# Patient Record
Sex: MALE | Race: WHITE | NOT HISPANIC OR LATINO | URBAN - METROPOLITAN AREA
[De-identification: names, ages, dates, MRNs, and addresses within clinical notes are randomized per-mention and may not be internally consistent; named-entity substitution may affect disease eponyms.]

---

## 2017-04-20 ENCOUNTER — OUTPATIENT (OUTPATIENT)
Dept: OUTPATIENT SERVICES | Facility: HOSPITAL | Age: 52
LOS: 1 days | End: 2017-04-20
Payer: COMMERCIAL

## 2017-04-20 DIAGNOSIS — Z00.00 ENCOUNTER FOR GENERAL ADULT MEDICAL EXAMINATION WITHOUT ABNORMAL FINDINGS: ICD-10-CM

## 2017-04-20 LAB
HBV SURFACE AB SER-ACNC: ABNORMAL
HBV SURFACE AG SER-ACNC: SIGNIFICANT CHANGE UP

## 2017-04-20 PROCEDURE — 87340 HEPATITIS B SURFACE AG IA: CPT

## 2017-04-20 PROCEDURE — 86706 HEP B SURFACE ANTIBODY: CPT

## 2017-04-20 PROCEDURE — 36415 COLL VENOUS BLD VENIPUNCTURE: CPT

## 2017-07-14 ENCOUNTER — OTHER (OUTPATIENT)
Age: 52
End: 2017-07-14

## 2017-08-18 ENCOUNTER — OUTPATIENT (OUTPATIENT)
Dept: OUTPATIENT SERVICES | Facility: HOSPITAL | Age: 52
LOS: 1 days | End: 2017-08-18
Payer: COMMERCIAL

## 2017-08-18 VITALS
TEMPERATURE: 98 F | HEART RATE: 80 BPM | OXYGEN SATURATION: 95 % | WEIGHT: 216.93 LBS | RESPIRATION RATE: 18 BRPM | SYSTOLIC BLOOD PRESSURE: 134 MMHG | HEIGHT: 65.5 IN | DIASTOLIC BLOOD PRESSURE: 96 MMHG

## 2017-08-18 DIAGNOSIS — K42.9 UMBILICAL HERNIA WITHOUT OBSTRUCTION OR GANGRENE: ICD-10-CM

## 2017-08-18 DIAGNOSIS — G47.30 SLEEP APNEA, UNSPECIFIED: ICD-10-CM

## 2017-08-18 DIAGNOSIS — Z01.818 ENCOUNTER FOR OTHER PREPROCEDURAL EXAMINATION: ICD-10-CM

## 2017-08-18 LAB
HCT VFR BLD CALC: 45.8 % — SIGNIFICANT CHANGE UP (ref 39–50)
HGB BLD-MCNC: 15.5 G/DL — SIGNIFICANT CHANGE UP (ref 13–17)
MCHC RBC-ENTMCNC: 29.4 PG — SIGNIFICANT CHANGE UP (ref 27–34)
MCHC RBC-ENTMCNC: 33.8 GM/DL — SIGNIFICANT CHANGE UP (ref 32–36)
MCV RBC AUTO: 86.9 FL — SIGNIFICANT CHANGE UP (ref 80–100)
PLATELET # BLD AUTO: 291 K/UL — SIGNIFICANT CHANGE UP (ref 150–400)
RBC # BLD: 5.27 M/UL — SIGNIFICANT CHANGE UP (ref 4.2–5.8)
RBC # FLD: 13.8 % — SIGNIFICANT CHANGE UP (ref 10.3–14.5)
WBC # BLD: 5.76 K/UL — SIGNIFICANT CHANGE UP (ref 3.8–10.5)
WBC # FLD AUTO: 5.76 K/UL — SIGNIFICANT CHANGE UP (ref 3.8–10.5)

## 2017-08-18 PROCEDURE — 85027 COMPLETE CBC AUTOMATED: CPT

## 2017-08-18 PROCEDURE — G0463: CPT

## 2017-08-18 RX ORDER — SODIUM CHLORIDE 9 MG/ML
3 INJECTION INTRAMUSCULAR; INTRAVENOUS; SUBCUTANEOUS EVERY 8 HOURS
Qty: 0 | Refills: 0 | Status: DISCONTINUED | OUTPATIENT
Start: 2017-08-28 | End: 2017-09-12

## 2017-08-18 RX ORDER — CELECOXIB 200 MG/1
200 CAPSULE ORAL ONCE
Qty: 0 | Refills: 0 | Status: COMPLETED | OUTPATIENT
Start: 2017-08-28 | End: 2017-08-28

## 2017-08-18 RX ORDER — ACETAMINOPHEN 500 MG
975 TABLET ORAL ONCE
Qty: 0 | Refills: 0 | Status: COMPLETED | OUTPATIENT
Start: 2017-08-28 | End: 2017-08-28

## 2017-08-18 RX ORDER — LIDOCAINE HCL 20 MG/ML
0.2 VIAL (ML) INJECTION ONCE
Qty: 0 | Refills: 0 | Status: DISCONTINUED | OUTPATIENT
Start: 2017-08-28 | End: 2017-09-12

## 2017-08-18 NOTE — H&P PST ADULT - HISTORY OF PRESENT ILLNESS
51 y/o M PMH LBP, S/P spinal cord stimulator, which has been removed, sarcoidosis of the lung, no longer follows up with pulmonologist, was told it "was no longer there," DONG, non-compliant with CPAP, c/o intermittent pain due to umbilical hernia.  Presents today for umbilical hernia repair.

## 2017-08-18 NOTE — H&P PST ADULT - PSH
Back pain  3/5/2013 replacement of generator  Back pain, chronic  permanent generator placed  Chronic back pain greater than 3 months duration  2/26/ 2013 temporary placement of generator  S/P knee surgery  right  2004

## 2017-08-18 NOTE — H&P PST ADULT - PMH
Lumbar Pain with Radiation Down Both Legs  started april 28 2012  Lung abnormality  scarring of lungs patient states is work related  Reflux    Sleep apnea Lumbar Pain with Radiation Down Both Legs  started april 28 2012  Lung abnormality  scarring of lungs patient states is work related  Reflux    Sarcoid    Sleep apnea

## 2017-08-28 ENCOUNTER — TRANSCRIPTION ENCOUNTER (OUTPATIENT)
Age: 52
End: 2017-08-28

## 2017-08-28 ENCOUNTER — OUTPATIENT (OUTPATIENT)
Dept: OUTPATIENT SERVICES | Facility: HOSPITAL | Age: 52
LOS: 1 days | End: 2017-08-28
Payer: COMMERCIAL

## 2017-08-28 ENCOUNTER — APPOINTMENT (OUTPATIENT)
Dept: SURGERY | Facility: HOSPITAL | Age: 52
End: 2017-08-28
Payer: COMMERCIAL

## 2017-08-28 ENCOUNTER — RESULT REVIEW (OUTPATIENT)
Age: 52
End: 2017-08-28

## 2017-08-28 VITALS
HEART RATE: 63 BPM | TEMPERATURE: 98 F | RESPIRATION RATE: 18 BRPM | SYSTOLIC BLOOD PRESSURE: 106 MMHG | DIASTOLIC BLOOD PRESSURE: 78 MMHG | OXYGEN SATURATION: 97 %

## 2017-08-28 VITALS
WEIGHT: 216.93 LBS | SYSTOLIC BLOOD PRESSURE: 137 MMHG | RESPIRATION RATE: 18 BRPM | HEART RATE: 80 BPM | DIASTOLIC BLOOD PRESSURE: 96 MMHG | HEIGHT: 65.5 IN | OXYGEN SATURATION: 95 % | TEMPERATURE: 98 F

## 2017-08-28 DIAGNOSIS — K42.9 UMBILICAL HERNIA WITHOUT OBSTRUCTION OR GANGRENE: ICD-10-CM

## 2017-08-28 PROCEDURE — C1781: CPT

## 2017-08-28 PROCEDURE — 88304 TISSUE EXAM BY PATHOLOGIST: CPT

## 2017-08-28 PROCEDURE — 49585: CPT

## 2017-08-28 PROCEDURE — 88304 TISSUE EXAM BY PATHOLOGIST: CPT | Mod: 26

## 2017-08-28 RX ORDER — ONDANSETRON 8 MG/1
4 TABLET, FILM COATED ORAL ONCE
Qty: 0 | Refills: 0 | Status: DISCONTINUED | OUTPATIENT
Start: 2017-08-28 | End: 2017-09-12

## 2017-08-28 RX ORDER — SODIUM CHLORIDE 9 MG/ML
1000 INJECTION, SOLUTION INTRAVENOUS
Qty: 0 | Refills: 0 | Status: DISCONTINUED | OUTPATIENT
Start: 2017-08-28 | End: 2017-09-12

## 2017-08-28 RX ORDER — CELECOXIB 200 MG/1
200 CAPSULE ORAL ONCE
Qty: 0 | Refills: 0 | Status: DISCONTINUED | OUTPATIENT
Start: 2017-08-28 | End: 2017-09-12

## 2017-08-28 RX ORDER — OXYCODONE HYDROCHLORIDE 5 MG/1
5 TABLET ORAL ONCE
Qty: 0 | Refills: 0 | Status: DISCONTINUED | OUTPATIENT
Start: 2017-08-28 | End: 2017-08-28

## 2017-08-28 RX ADMIN — Medication 975 MILLIGRAM(S): at 07:21

## 2017-08-28 RX ADMIN — CELECOXIB 200 MILLIGRAM(S): 200 CAPSULE ORAL at 07:22

## 2017-08-28 NOTE — ASU DISCHARGE PLAN (ADULT/PEDIATRIC). - MEDICATION SUMMARY - MEDICATIONS TO TAKE
I will START or STAY ON the medications listed below when I get home from the hospital:    Pepcid 20 mg oral tablet  --  by mouth as per ASU protocol  -- Indication: For GERD

## 2017-08-28 NOTE — ASU PATIENT PROFILE, ADULT - PMH
Lumbar Pain with Radiation Down Both Legs  started april 28 2012  Lung abnormality  scarring of lungs patient states is work related  Reflux    Sarcoid    Sleep apnea

## 2017-08-28 NOTE — ASU DISCHARGE PLAN (ADULT/PEDIATRIC). - SPECIAL INSTRUCTIONS
Please follow up with Dr. De Luna in two weeks.    Do not lift anything over ten pounds. Do not strain. Do not drive as your movements will be restricted by pain. You will be discharged with pain medications, please do not drive while taking these. Do not drink alcohol while taking narcotic pain medications.     Your incision is covered by strips called steri-strips. These will fall off on their own. Please do not pick at or take them off. You may shower with them, just allow water to run over them and do not scrub or submerge them.

## 2017-08-28 NOTE — ASU DISCHARGE PLAN (ADULT/PEDIATRIC). - NOTIFY
Pain not relieved by Medications/Inability to Tolerate Liquids or Foods/Bleeding that does not stop/Persistent Nausea and Vomiting/Numbness, color, or temperature change to extremity/Unable to Urinate

## 2017-08-29 LAB — SURGICAL PATHOLOGY STUDY: SIGNIFICANT CHANGE UP

## 2017-09-13 ENCOUNTER — APPOINTMENT (OUTPATIENT)
Dept: SURGERY | Facility: CLINIC | Age: 52
End: 2017-09-13
Payer: COMMERCIAL

## 2017-09-13 VITALS
DIASTOLIC BLOOD PRESSURE: 89 MMHG | OXYGEN SATURATION: 100 % | SYSTOLIC BLOOD PRESSURE: 143 MMHG | TEMPERATURE: 97.8 F | HEART RATE: 81 BPM | RESPIRATION RATE: 15 BRPM

## 2017-09-13 PROCEDURE — 99024 POSTOP FOLLOW-UP VISIT: CPT

## 2017-09-13 RX ORDER — OXYCODONE AND ACETAMINOPHEN 5; 325 MG/1; MG/1
5-325 TABLET ORAL
Qty: 10 | Refills: 0 | Status: DISCONTINUED | COMMUNITY
Start: 2017-08-28 | End: 2017-09-13

## 2017-10-31 ENCOUNTER — TRANSCRIPTION ENCOUNTER (OUTPATIENT)
Age: 52
End: 2017-10-31

## 2017-11-08 ENCOUNTER — TRANSCRIPTION ENCOUNTER (OUTPATIENT)
Age: 52
End: 2017-11-08

## 2018-10-02 ENCOUNTER — APPOINTMENT (OUTPATIENT)
Dept: ULTRASOUND IMAGING | Facility: CLINIC | Age: 53
End: 2018-10-02
Payer: COMMERCIAL

## 2018-10-02 ENCOUNTER — OUTPATIENT (OUTPATIENT)
Dept: OUTPATIENT SERVICES | Facility: HOSPITAL | Age: 53
LOS: 1 days | End: 2018-10-02
Payer: COMMERCIAL

## 2018-10-02 DIAGNOSIS — Z00.8 ENCOUNTER FOR OTHER GENERAL EXAMINATION: ICD-10-CM

## 2018-10-02 PROCEDURE — 76700 US EXAM ABDOM COMPLETE: CPT | Mod: 26

## 2018-10-02 PROCEDURE — 76700 US EXAM ABDOM COMPLETE: CPT

## 2018-10-06 ENCOUNTER — TRANSCRIPTION ENCOUNTER (OUTPATIENT)
Age: 53
End: 2018-10-06

## 2019-01-08 ENCOUNTER — TRANSCRIPTION ENCOUNTER (OUTPATIENT)
Age: 54
End: 2019-01-08

## 2019-01-29 ENCOUNTER — APPOINTMENT (OUTPATIENT)
Dept: ORTHOPEDIC SURGERY | Facility: CLINIC | Age: 54
End: 2019-01-29

## 2019-02-01 ENCOUNTER — APPOINTMENT (OUTPATIENT)
Dept: ORTHOPEDIC SURGERY | Facility: CLINIC | Age: 54
End: 2019-02-01
Payer: COMMERCIAL

## 2019-02-01 VITALS — HEIGHT: 65 IN | BODY MASS INDEX: 34.66 KG/M2 | WEIGHT: 208 LBS

## 2019-02-01 PROCEDURE — 99203 OFFICE O/P NEW LOW 30 MIN: CPT

## 2019-02-01 PROCEDURE — 73030 X-RAY EXAM OF SHOULDER: CPT | Mod: RT

## 2019-02-01 NOTE — DISCUSSION/SUMMARY
[de-identified] : The underlying pathophysiology was reviewed in great detail with the patient as well as the various treatment options, including ice, analgesics, NSAIDs, Physical therapy, steroid injections, right TSA, hyaluronic acid injections, right shoulder arthroscopic debridement. \par \par The risks and benefits of a RIGHT TOTAL SHOULDER ARTHROPLASTY were discussed in great detail today, including but not limited to bleeding, infection, nerve injury, DVT, allergy to the anesthetic or to the implants, persistent pain, stiffness, scarring, swelling or deformity. He is going to take time to decide if he would like to proceed with surgical intervention at this time.

## 2019-02-01 NOTE — ADDENDUM
[FreeTextEntry1] : I, Lizette Flores, acted solely as a scribe for Dr. Alec Castaneda on this date 02/01/2019 .

## 2019-02-01 NOTE — END OF VISIT
[FreeTextEntry3] : All medical record entries made by the [facility] were at my, Dr. Alec Castaneda, direction and personally dictated by me on 02/01/2019. I have reviewed the chart and agree that the record accurately reflects my personal performance of the history, physical exam, assessment and plan. I have also personally directed, reviewed, and agreed with the chart.

## 2019-02-01 NOTE — PHYSICAL EXAM
[Normal RUE] : Right Upper Extremity: No scars, rashes, lesions, ulcers, skin intact [Normal LUE] : Left Upper Extremity: No scars, rashes, lesions, ulcers, skin intact [Normal Touch] : sensation intact for touch [Normal] : No swelling, no edema, normal pedal pulses and normal temperature [Obese] : obese [Poor Appearance] : well-appearing [Acute Distress] : not in acute distress [de-identified] : Right Upper Extremity\par o Shoulder :\par ¦ Inspection/Palpation : no tenderness, no swelling, no deformities\par ¦ Range of Motion : ACTIVE FORWARD ELEVATION: Measured at 80 degrees, ACTIVE EXTERNAL ROTATION: Measured at 30 degrees, ACTIVE INTERNAL ROTATION: Measured at greater trochanter \par ¦ Strength : external rotation 5/5, internal rotation 5/5, supraspinatus 5/5 \par ¦ Stability : no joint instability on provocative testing\par o Upper Arm : no tenderness, no swelling, no deformities\par o Muscle Bulk : no atrophy \par o Sensation : sensation intact to light touch \par o Skin : no skin rash or discoloration \par o Vascular Exam : no edema, no cyanosis, radial and ulnar pulses normal \par  \par Left Upper Extremity\par o Shoulder : \par ¦ Inspection/Palpation : no tenderness, no swelling, no deformities\par ¦ Range of Motion : ACTIVE FORWARD ELEVATION: Measured at 125 degrees, ACTIVE EXTERNAL ROTATION: Measured at 30 degrees, ACTIVE INTERNAL ROTATION: Measured at PSIS \par ¦ Strength : external rotation 5/5, internal rotation 5/5, supraspinatus 5/5 \par ¦ Stability : no joint instability on provocative testing\par o Upper Arm : no tenderness, no swelling, no deformities\par o Muscle Bulk : no atrophy\par o Sensation : sensation intact to light touch\par o Skin : no skin rash or discoloration\par o Vascular Exam : no edema, no cyanosis, radial and ulnar pulses normal  [de-identified] : o An MRI of the right shoulder was obtained at [facility] on 1/26/2019, revealed:\par 1. No complete rotator cuff tear.\par 2. There is supraspinatus tendinosis with interstitial tearing at the footprint.\par 3. Moderate to severe glenohumeral arthrosis is present with small moderate sized effusion.\par \par XRays taken in the office today, 2/1/2019:\par o  Right Shoulder : Grashey, axillary, and outlet views were obtained, there are no soft tissue abnormalities, no fractures, moderate to advanced glenohumeral osteoarthritis\par

## 2019-02-01 NOTE — CONSULT LETTER
[Dear  ___] : Dear  [unfilled], [Consult Letter:] : I had the pleasure of evaluating your patient, [unfilled]. [Please see my note below.] : Please see my note below. [Consult Closing:] : Thank you very much for allowing me to participate in the care of this patient.  If you have any questions, please do not hesitate to contact me. [Sincerely,] : Sincerely, [FreeTextEntry2] : SELF,REFERRED [FreeTextEntry3] :  Dr. Alec Castaneda

## 2019-02-20 ENCOUNTER — OUTPATIENT (OUTPATIENT)
Dept: OUTPATIENT SERVICES | Facility: HOSPITAL | Age: 54
LOS: 1 days | End: 2019-02-20
Payer: COMMERCIAL

## 2019-02-20 VITALS
OXYGEN SATURATION: 96 % | RESPIRATION RATE: 16 BRPM | DIASTOLIC BLOOD PRESSURE: 90 MMHG | TEMPERATURE: 98 F | HEART RATE: 73 BPM | WEIGHT: 210.98 LBS | HEIGHT: 67 IN | SYSTOLIC BLOOD PRESSURE: 140 MMHG

## 2019-02-20 DIAGNOSIS — Z01.818 ENCOUNTER FOR OTHER PREPROCEDURAL EXAMINATION: ICD-10-CM

## 2019-02-20 DIAGNOSIS — D86.9 SARCOIDOSIS, UNSPECIFIED: ICD-10-CM

## 2019-02-20 DIAGNOSIS — Z98.890 OTHER SPECIFIED POSTPROCEDURAL STATES: Chronic | ICD-10-CM

## 2019-02-20 DIAGNOSIS — M19.011 PRIMARY OSTEOARTHRITIS, RIGHT SHOULDER: ICD-10-CM

## 2019-02-20 DIAGNOSIS — M54.5 LOW BACK PAIN: Chronic | ICD-10-CM

## 2019-02-20 LAB
ALBUMIN SERPL ELPH-MCNC: 4 G/DL — SIGNIFICANT CHANGE UP (ref 3.3–5)
ALP SERPL-CCNC: 84 U/L — SIGNIFICANT CHANGE UP (ref 30–120)
ALT FLD-CCNC: 39 U/L DA — SIGNIFICANT CHANGE UP (ref 10–60)
ANION GAP SERPL CALC-SCNC: 10 MMOL/L — SIGNIFICANT CHANGE UP (ref 5–17)
APTT BLD: 33.7 SEC — SIGNIFICANT CHANGE UP (ref 28.5–37)
AST SERPL-CCNC: 20 U/L — SIGNIFICANT CHANGE UP (ref 10–40)
BILIRUB SERPL-MCNC: 0.4 MG/DL — SIGNIFICANT CHANGE UP (ref 0.2–1.2)
BLD GP AB SCN SERPL QL: SIGNIFICANT CHANGE UP
BUN SERPL-MCNC: 13 MG/DL — SIGNIFICANT CHANGE UP (ref 7–23)
CALCIUM SERPL-MCNC: 9.1 MG/DL — SIGNIFICANT CHANGE UP (ref 8.4–10.5)
CHLORIDE SERPL-SCNC: 104 MMOL/L — SIGNIFICANT CHANGE UP (ref 96–108)
CO2 SERPL-SCNC: 25 MMOL/L — SIGNIFICANT CHANGE UP (ref 22–31)
CREAT SERPL-MCNC: 1.04 MG/DL — SIGNIFICANT CHANGE UP (ref 0.5–1.3)
GLUCOSE SERPL-MCNC: 104 MG/DL — HIGH (ref 70–99)
HCT VFR BLD CALC: 47.4 % — SIGNIFICANT CHANGE UP (ref 39–50)
HGB BLD-MCNC: 15.9 G/DL — SIGNIFICANT CHANGE UP (ref 13–17)
INR BLD: 1.17 RATIO — HIGH (ref 0.88–1.16)
MCHC RBC-ENTMCNC: 28.6 PG — SIGNIFICANT CHANGE UP (ref 27–34)
MCHC RBC-ENTMCNC: 33.5 GM/DL — SIGNIFICANT CHANGE UP (ref 32–36)
MCV RBC AUTO: 85.3 FL — SIGNIFICANT CHANGE UP (ref 80–100)
MRSA PCR RESULT.: SIGNIFICANT CHANGE UP
NRBC # BLD: 0 /100 WBCS — SIGNIFICANT CHANGE UP (ref 0–0)
PLATELET # BLD AUTO: 277 K/UL — SIGNIFICANT CHANGE UP (ref 150–400)
POTASSIUM SERPL-MCNC: 4.5 MMOL/L — SIGNIFICANT CHANGE UP (ref 3.5–5.3)
POTASSIUM SERPL-SCNC: 4.5 MMOL/L — SIGNIFICANT CHANGE UP (ref 3.5–5.3)
PROT SERPL-MCNC: 8 G/DL — SIGNIFICANT CHANGE UP (ref 6–8.3)
PROTHROM AB SERPL-ACNC: 12.8 SEC — SIGNIFICANT CHANGE UP (ref 10–12.9)
RBC # BLD: 5.56 M/UL — SIGNIFICANT CHANGE UP (ref 4.2–5.8)
RBC # FLD: 13.1 % — SIGNIFICANT CHANGE UP (ref 10.3–14.5)
S AUREUS DNA NOSE QL NAA+PROBE: DETECTED
SODIUM SERPL-SCNC: 139 MMOL/L — SIGNIFICANT CHANGE UP (ref 135–145)
WBC # BLD: 5.18 K/UL — SIGNIFICANT CHANGE UP (ref 3.8–10.5)
WBC # FLD AUTO: 5.18 K/UL — SIGNIFICANT CHANGE UP (ref 3.8–10.5)

## 2019-02-20 PROCEDURE — 80053 COMPREHEN METABOLIC PANEL: CPT

## 2019-02-20 PROCEDURE — 86901 BLOOD TYPING SEROLOGIC RH(D): CPT

## 2019-02-20 PROCEDURE — 86900 BLOOD TYPING SEROLOGIC ABO: CPT

## 2019-02-20 PROCEDURE — 71046 X-RAY EXAM CHEST 2 VIEWS: CPT

## 2019-02-20 PROCEDURE — 93005 ELECTROCARDIOGRAM TRACING: CPT

## 2019-02-20 PROCEDURE — 71046 X-RAY EXAM CHEST 2 VIEWS: CPT | Mod: 26

## 2019-02-20 PROCEDURE — 93010 ELECTROCARDIOGRAM REPORT: CPT

## 2019-02-20 PROCEDURE — 85610 PROTHROMBIN TIME: CPT

## 2019-02-20 PROCEDURE — 87640 STAPH A DNA AMP PROBE: CPT

## 2019-02-20 PROCEDURE — 85730 THROMBOPLASTIN TIME PARTIAL: CPT

## 2019-02-20 PROCEDURE — 85027 COMPLETE CBC AUTOMATED: CPT

## 2019-02-20 PROCEDURE — G0463: CPT

## 2019-02-20 PROCEDURE — 36415 COLL VENOUS BLD VENIPUNCTURE: CPT

## 2019-02-20 PROCEDURE — 87641 MR-STAPH DNA AMP PROBE: CPT

## 2019-02-20 PROCEDURE — 86850 RBC ANTIBODY SCREEN: CPT

## 2019-02-20 RX ORDER — FAMOTIDINE 10 MG/ML
0 INJECTION INTRAVENOUS
Qty: 0 | Refills: 0 | COMMUNITY

## 2019-02-20 NOTE — H&P PST ADULT - FAMILY HISTORY
Father  Still living? Yes, Estimated age: 81-90  Family history of prostate cancer, Age at diagnosis: Age Unknown     Mother  Still living? Yes, Estimated age: 81-90  Family history of diabetes mellitus, Age at diagnosis: Age Unknown

## 2019-02-20 NOTE — H&P PST ADULT - PROBLEM SELECTOR PLAN 1
"Right total shoulder arthroplasty" on 3/11/19  Diagnostics ordered  Pending medical clearance   Instructions reviewed and signed  Contact info given   Best wishes offered "Right total shoulder arthroplasty" on 3/11/19  Diagnostics ordered  Pending medical clearance   respiratory consult today  Instructions reviewed and signed  Contact info given   Best wishes offered

## 2019-02-20 NOTE — H&P PST ADULT - PMH
BMI 33.0-33.9,adult    Chronic lower back pain    Herniated lumbar intervertebral disc    Obesity (BMI 30.0-34.9)    Osteoarthritis of right shoulder    Sarcoidosis    Sleep apnea  CPAP

## 2019-02-20 NOTE — H&P PST ADULT - MUSCULOSKELETAL
details… detailed exam no joint erythema/no joint warmth/decreased ROM due to pain/no calf tenderness/no joint swelling

## 2019-02-20 NOTE — H&P PST ADULT - PSH
Chronic back pain greater than 3 months duration  2/26/ 2013 temporary placement of generator,  removed 2013  Chronic lower back pain    History of hemorrhoidectomy  2018  History of hernia repair  ventral 2018  S/P knee surgery  right  2004

## 2019-02-20 NOTE — H&P PST ADULT - HISTORY OF PRESENT ILLNESS
52 yo male presents to PST for scheduled RIGHT total shoulder arthroplasty on 3/11/19 with Alec Castaneda MD.  Reports right shoulder pain with restricted ROM, worst with reaching and lifting that rates 10/10.  Patient has tried cortisone injection without relief.

## 2019-02-20 NOTE — H&P PST ADULT - NEGATIVE ENMT SYMPTOMS
no gum bleeding/no post-nasal discharge/no dry mouth/no dysphagia/no sinus symptoms/no nasal discharge/no vertigo/no nasal congestion/no nose bleeds/no recurrent cold sores/no throat pain/no hearing difficulty/no ear pain/no tinnitus/no nasal obstruction/no abnormal taste sensation

## 2019-02-21 PROBLEM — D86.9 SARCOIDOSIS, UNSPECIFIED: Chronic | Status: INACTIVE | Noted: 2017-08-18 | Resolved: 2019-02-20

## 2019-02-22 RX ORDER — MUPIROCIN 20 MG/G
1 OINTMENT TOPICAL
Qty: 1 | Refills: 0 | OUTPATIENT
Start: 2019-02-22 | End: 2019-02-26

## 2019-02-22 NOTE — PROGRESS NOTE ADULT - SUBJECTIVE AND OBJECTIVE BOX
Nose culture positive for MSSA. Mupirocin ointment 2% escribed and sent to patient's pharmacy and to be used twice a day for 5 consecutive days. Called patient to discuss results. Questions answered and he verbalized an understanding of the treatment protocol.

## 2019-03-06 RX ORDER — TRANEXAMIC ACID 100 MG/ML
1000 INJECTION, SOLUTION INTRAVENOUS ONCE
Qty: 0 | Refills: 0 | Status: COMPLETED | OUTPATIENT
Start: 2019-03-11 | End: 2019-03-11

## 2019-03-06 RX ORDER — ACETAMINOPHEN 500 MG
1000 TABLET ORAL ONCE
Qty: 0 | Refills: 0 | Status: COMPLETED | OUTPATIENT
Start: 2019-03-11 | End: 2019-03-11

## 2019-03-06 RX ORDER — CEFAZOLIN SODIUM 1 G
2000 VIAL (EA) INJECTION ONCE
Qty: 0 | Refills: 0 | Status: COMPLETED | OUTPATIENT
Start: 2019-03-11 | End: 2019-03-11

## 2019-03-06 RX ORDER — SODIUM CHLORIDE 9 MG/ML
1000 INJECTION, SOLUTION INTRAVENOUS
Qty: 0 | Refills: 0 | Status: DISCONTINUED | OUTPATIENT
Start: 2019-03-11 | End: 2019-03-11

## 2019-03-06 RX ORDER — CHLORHEXIDINE GLUCONATE 213 G/1000ML
1 SOLUTION TOPICAL ONCE
Qty: 0 | Refills: 0 | Status: COMPLETED | OUTPATIENT
Start: 2019-03-11 | End: 2019-03-11

## 2019-03-06 RX ORDER — APREPITANT 80 MG/1
40 CAPSULE ORAL ONCE
Qty: 0 | Refills: 0 | Status: COMPLETED | OUTPATIENT
Start: 2019-03-11 | End: 2019-03-11

## 2019-03-08 PROBLEM — M54.5 LOW BACK PAIN: Chronic | Status: ACTIVE | Noted: 2019-02-20

## 2019-03-08 PROBLEM — M19.011 PRIMARY OSTEOARTHRITIS, RIGHT SHOULDER: Chronic | Status: ACTIVE | Noted: 2019-02-20

## 2019-03-08 PROBLEM — E66.9 OBESITY, UNSPECIFIED: Chronic | Status: ACTIVE | Noted: 2019-02-20

## 2019-03-08 PROBLEM — M51.26 OTHER INTERVERTEBRAL DISC DISPLACEMENT, LUMBAR REGION: Chronic | Status: ACTIVE | Noted: 2019-02-20

## 2019-03-08 PROBLEM — D86.9 SARCOIDOSIS, UNSPECIFIED: Chronic | Status: ACTIVE | Noted: 2019-02-20

## 2019-03-08 RX ORDER — SODIUM CHLORIDE 9 MG/ML
1000 INJECTION, SOLUTION INTRAVENOUS
Qty: 0 | Refills: 0 | Status: DISCONTINUED | OUTPATIENT
Start: 2019-03-11 | End: 2019-03-12

## 2019-03-08 RX ORDER — POLYETHYLENE GLYCOL 3350 17 G/17G
17 POWDER, FOR SOLUTION ORAL DAILY
Qty: 0 | Refills: 0 | Status: DISCONTINUED | OUTPATIENT
Start: 2019-03-11 | End: 2019-03-12

## 2019-03-08 RX ORDER — SENNA PLUS 8.6 MG/1
2 TABLET ORAL AT BEDTIME
Qty: 0 | Refills: 0 | Status: DISCONTINUED | OUTPATIENT
Start: 2019-03-11 | End: 2019-03-12

## 2019-03-08 RX ORDER — DOCUSATE SODIUM 100 MG
100 CAPSULE ORAL THREE TIMES A DAY
Qty: 0 | Refills: 0 | Status: DISCONTINUED | OUTPATIENT
Start: 2019-03-11 | End: 2019-03-12

## 2019-03-08 RX ORDER — PANTOPRAZOLE SODIUM 20 MG/1
40 TABLET, DELAYED RELEASE ORAL
Qty: 0 | Refills: 0 | Status: DISCONTINUED | OUTPATIENT
Start: 2019-03-11 | End: 2019-03-12

## 2019-03-08 RX ORDER — ONDANSETRON 8 MG/1
4 TABLET, FILM COATED ORAL EVERY 6 HOURS
Qty: 0 | Refills: 0 | Status: DISCONTINUED | OUTPATIENT
Start: 2019-03-11 | End: 2019-03-12

## 2019-03-08 RX ORDER — MAGNESIUM HYDROXIDE 400 MG/1
30 TABLET, CHEWABLE ORAL DAILY
Qty: 0 | Refills: 0 | Status: DISCONTINUED | OUTPATIENT
Start: 2019-03-11 | End: 2019-03-12

## 2019-03-10 ENCOUNTER — TRANSCRIPTION ENCOUNTER (OUTPATIENT)
Age: 54
End: 2019-03-10

## 2019-03-11 ENCOUNTER — INPATIENT (INPATIENT)
Facility: HOSPITAL | Age: 54
LOS: 0 days | Discharge: HOME | DRG: 483 | End: 2019-03-12
Attending: ORTHOPAEDIC SURGERY | Admitting: ORTHOPAEDIC SURGERY
Payer: COMMERCIAL

## 2019-03-11 ENCOUNTER — TRANSCRIPTION ENCOUNTER (OUTPATIENT)
Age: 54
End: 2019-03-11

## 2019-03-11 ENCOUNTER — RESULT REVIEW (OUTPATIENT)
Age: 54
End: 2019-03-11

## 2019-03-11 ENCOUNTER — APPOINTMENT (OUTPATIENT)
Dept: ORTHOPEDIC SURGERY | Facility: HOSPITAL | Age: 54
End: 2019-03-11

## 2019-03-11 VITALS
RESPIRATION RATE: 22 BRPM | OXYGEN SATURATION: 96 % | HEART RATE: 77 BPM | SYSTOLIC BLOOD PRESSURE: 127 MMHG | DIASTOLIC BLOOD PRESSURE: 86 MMHG | WEIGHT: 217.6 LBS | TEMPERATURE: 98 F | HEIGHT: 65.5 IN

## 2019-03-11 DIAGNOSIS — Z98.890 OTHER SPECIFIED POSTPROCEDURAL STATES: Chronic | ICD-10-CM

## 2019-03-11 DIAGNOSIS — M19.011 PRIMARY OSTEOARTHRITIS, RIGHT SHOULDER: ICD-10-CM

## 2019-03-11 DIAGNOSIS — D86.9 SARCOIDOSIS, UNSPECIFIED: ICD-10-CM

## 2019-03-11 DIAGNOSIS — M54.5 LOW BACK PAIN: Chronic | ICD-10-CM

## 2019-03-11 DIAGNOSIS — E66.9 OBESITY, UNSPECIFIED: ICD-10-CM

## 2019-03-11 LAB — GLUCOSE BLDC GLUCOMTR-MCNC: 131 MG/DL — HIGH (ref 70–99)

## 2019-03-11 PROCEDURE — 23472 RECONSTRUCT SHOULDER JOINT: CPT | Mod: AS,RT

## 2019-03-11 PROCEDURE — 23472 RECONSTRUCT SHOULDER JOINT: CPT | Mod: RT

## 2019-03-11 PROCEDURE — 88304 TISSUE EXAM BY PATHOLOGIST: CPT | Mod: 26

## 2019-03-11 PROCEDURE — 73030 X-RAY EXAM OF SHOULDER: CPT | Mod: 26,RT

## 2019-03-11 PROCEDURE — 88311 DECALCIFY TISSUE: CPT | Mod: 26

## 2019-03-11 PROCEDURE — 73020 X-RAY EXAM OF SHOULDER: CPT | Mod: 26,RT

## 2019-03-11 RX ORDER — CELECOXIB 200 MG/1
1 CAPSULE ORAL
Qty: 60 | Refills: 0
Start: 2019-03-11 | End: 2019-04-09

## 2019-03-11 RX ORDER — ACETAMINOPHEN 500 MG
1000 TABLET ORAL EVERY 6 HOURS
Qty: 0 | Refills: 0 | Status: COMPLETED | OUTPATIENT
Start: 2019-03-11 | End: 2019-03-12

## 2019-03-11 RX ORDER — DOCUSATE SODIUM 100 MG
1 CAPSULE ORAL
Qty: 0 | Refills: 0 | DISCHARGE
Start: 2019-03-11

## 2019-03-11 RX ORDER — SODIUM CHLORIDE 9 MG/ML
1000 INJECTION, SOLUTION INTRAVENOUS
Qty: 0 | Refills: 0 | Status: DISCONTINUED | OUTPATIENT
Start: 2019-03-11 | End: 2019-03-11

## 2019-03-11 RX ORDER — SENNA PLUS 8.6 MG/1
2 TABLET ORAL
Qty: 0 | Refills: 0 | DISCHARGE
Start: 2019-03-11

## 2019-03-11 RX ORDER — ASPIRIN/CALCIUM CARB/MAGNESIUM 324 MG
1 TABLET ORAL
Qty: 84 | Refills: 0
Start: 2019-03-11 | End: 2019-04-21

## 2019-03-11 RX ORDER — PANTOPRAZOLE SODIUM 20 MG/1
1 TABLET, DELAYED RELEASE ORAL
Qty: 30 | Refills: 0
Start: 2019-03-11 | End: 2019-04-09

## 2019-03-11 RX ORDER — ACETAMINOPHEN 500 MG
2 TABLET ORAL
Qty: 0 | Refills: 0 | COMMUNITY

## 2019-03-11 RX ORDER — ASPIRIN/CALCIUM CARB/MAGNESIUM 324 MG
81 TABLET ORAL EVERY 12 HOURS
Qty: 0 | Refills: 0 | Status: DISCONTINUED | OUTPATIENT
Start: 2019-03-12 | End: 2019-03-12

## 2019-03-11 RX ORDER — OXYCODONE HYDROCHLORIDE 5 MG/1
5 TABLET ORAL
Qty: 0 | Refills: 0 | Status: DISCONTINUED | OUTPATIENT
Start: 2019-03-11 | End: 2019-03-12

## 2019-03-11 RX ORDER — HYDROMORPHONE HYDROCHLORIDE 2 MG/ML
0.5 INJECTION INTRAMUSCULAR; INTRAVENOUS; SUBCUTANEOUS
Qty: 0 | Refills: 0 | Status: DISCONTINUED | OUTPATIENT
Start: 2019-03-11 | End: 2019-03-11

## 2019-03-11 RX ORDER — CEFAZOLIN SODIUM 1 G
2000 VIAL (EA) INJECTION EVERY 8 HOURS
Qty: 0 | Refills: 0 | Status: COMPLETED | OUTPATIENT
Start: 2019-03-11 | End: 2019-03-11

## 2019-03-11 RX ORDER — ACETAMINOPHEN 500 MG
1000 TABLET ORAL EVERY 8 HOURS
Qty: 0 | Refills: 0 | Status: DISCONTINUED | OUTPATIENT
Start: 2019-03-12 | End: 2019-03-12

## 2019-03-11 RX ORDER — MAGNESIUM HYDROXIDE 400 MG/1
30 TABLET, CHEWABLE ORAL
Qty: 0 | Refills: 0 | DISCHARGE
Start: 2019-03-11

## 2019-03-11 RX ORDER — POLYETHYLENE GLYCOL 3350 17 G/17G
17 POWDER, FOR SOLUTION ORAL
Qty: 0 | Refills: 0 | DISCHARGE
Start: 2019-03-11

## 2019-03-11 RX ORDER — CELECOXIB 200 MG/1
200 CAPSULE ORAL EVERY 12 HOURS
Qty: 0 | Refills: 0 | Status: DISCONTINUED | OUTPATIENT
Start: 2019-03-11 | End: 2019-03-12

## 2019-03-11 RX ORDER — ACETAMINOPHEN 500 MG
2 TABLET ORAL
Qty: 0 | Refills: 0 | DISCHARGE
Start: 2019-03-11

## 2019-03-11 RX ORDER — OXYCODONE HYDROCHLORIDE 5 MG/1
10 TABLET ORAL
Qty: 0 | Refills: 0 | Status: DISCONTINUED | OUTPATIENT
Start: 2019-03-11 | End: 2019-03-12

## 2019-03-11 RX ORDER — HYDROMORPHONE HYDROCHLORIDE 2 MG/ML
0.5 INJECTION INTRAMUSCULAR; INTRAVENOUS; SUBCUTANEOUS
Qty: 0 | Refills: 0 | Status: DISCONTINUED | OUTPATIENT
Start: 2019-03-11 | End: 2019-03-12

## 2019-03-11 RX ORDER — OXYCODONE HYDROCHLORIDE 5 MG/1
2 TABLET ORAL
Qty: 60 | Refills: 0
Start: 2019-03-11 | End: 2019-03-17

## 2019-03-11 RX ADMIN — CELECOXIB 200 MILLIGRAM(S): 200 CAPSULE ORAL at 21:38

## 2019-03-11 RX ADMIN — HYDROMORPHONE HYDROCHLORIDE 0.5 MILLIGRAM(S): 2 INJECTION INTRAMUSCULAR; INTRAVENOUS; SUBCUTANEOUS at 13:30

## 2019-03-11 RX ADMIN — Medication 1000 MILLIGRAM(S): at 21:40

## 2019-03-11 RX ADMIN — HYDROMORPHONE HYDROCHLORIDE 0.5 MILLIGRAM(S): 2 INJECTION INTRAMUSCULAR; INTRAVENOUS; SUBCUTANEOUS at 20:00

## 2019-03-11 RX ADMIN — APREPITANT 40 MILLIGRAM(S): 80 CAPSULE ORAL at 07:17

## 2019-03-11 RX ADMIN — SENNA PLUS 2 TABLET(S): 8.6 TABLET ORAL at 21:38

## 2019-03-11 RX ADMIN — HYDROMORPHONE HYDROCHLORIDE 0.5 MILLIGRAM(S): 2 INJECTION INTRAMUSCULAR; INTRAVENOUS; SUBCUTANEOUS at 13:54

## 2019-03-11 RX ADMIN — HYDROMORPHONE HYDROCHLORIDE 0.5 MILLIGRAM(S): 2 INJECTION INTRAMUSCULAR; INTRAVENOUS; SUBCUTANEOUS at 16:15

## 2019-03-11 RX ADMIN — HYDROMORPHONE HYDROCHLORIDE 0.5 MILLIGRAM(S): 2 INJECTION INTRAMUSCULAR; INTRAVENOUS; SUBCUTANEOUS at 14:27

## 2019-03-11 RX ADMIN — OXYCODONE HYDROCHLORIDE 10 MILLIGRAM(S): 5 TABLET ORAL at 23:45

## 2019-03-11 RX ADMIN — OXYCODONE HYDROCHLORIDE 10 MILLIGRAM(S): 5 TABLET ORAL at 23:05

## 2019-03-11 RX ADMIN — SODIUM CHLORIDE 75 MILLILITER(S): 9 INJECTION, SOLUTION INTRAVENOUS at 07:00

## 2019-03-11 RX ADMIN — Medication 400 MILLIGRAM(S): at 19:28

## 2019-03-11 RX ADMIN — Medication 100 MILLIGRAM(S): at 16:01

## 2019-03-11 RX ADMIN — Medication 100 MILLIGRAM(S): at 23:05

## 2019-03-11 RX ADMIN — Medication 30 MILLILITER(S): at 21:38

## 2019-03-11 RX ADMIN — Medication 400 MILLIGRAM(S): at 14:21

## 2019-03-11 RX ADMIN — CHLORHEXIDINE GLUCONATE 1 APPLICATION(S): 213 SOLUTION TOPICAL at 07:00

## 2019-03-11 RX ADMIN — HYDROMORPHONE HYDROCHLORIDE 0.5 MILLIGRAM(S): 2 INJECTION INTRAMUSCULAR; INTRAVENOUS; SUBCUTANEOUS at 16:01

## 2019-03-11 RX ADMIN — SODIUM CHLORIDE 50 MILLILITER(S): 9 INJECTION, SOLUTION INTRAVENOUS at 13:32

## 2019-03-11 RX ADMIN — Medication 100 MILLIGRAM(S): at 21:38

## 2019-03-11 RX ADMIN — Medication 1000 MILLIGRAM(S): at 15:02

## 2019-03-11 RX ADMIN — HYDROMORPHONE HYDROCHLORIDE 0.5 MILLIGRAM(S): 2 INJECTION INTRAMUSCULAR; INTRAVENOUS; SUBCUTANEOUS at 19:28

## 2019-03-11 RX ADMIN — HYDROMORPHONE HYDROCHLORIDE 0.5 MILLIGRAM(S): 2 INJECTION INTRAMUSCULAR; INTRAVENOUS; SUBCUTANEOUS at 13:52

## 2019-03-11 RX ADMIN — CELECOXIB 200 MILLIGRAM(S): 200 CAPSULE ORAL at 21:40

## 2019-03-11 NOTE — CONSULT NOTE ADULT - SUBJECTIVE AND OBJECTIVE BOX
PULMONARY/CRITICAL CARE        Patient is a 53y old  Male who presents with a chief complaint of Right Shoulder Pain (11 Mar 2019 16:29)    BRIEF HOSPITAL COURSE: ***Had right shoulder replacement, doing well.     Events last 24 hours: ***    PAST MEDICAL & SURGICAL HISTORY:  Chronic lower back pain  Obesity (BMI 30.0-34.9)  BMI 33.0-33.9,adult  Herniated lumbar intervertebral disc  Sarcoidosis in remission  Osteoarthritis of right shoulder  Sleep apnea: CPAP 6--Dr. Michael  Chronic lower back pain  History of hernia repair: ventral 2018  History of hemorrhoidectomy: 2018  Chronic back pain greater than 3 months duration: 2/26/ 2013 temporary placement of generator,  removed 2013  S/P knee surgery: right  2004    Allergies    No Known Allergies    Intolerances      FAMILY HISTORY and SOCIAL--smoked for 14 yrs 1 ppd, now vapes  No etoh    Family history of diabetes mellitus (Mother)  Family history of prostate cancer (Father)      Review of Systems:  CONSTITUTIONAL: No fever, chills, or fatigue  EYES: No eye pain, visual disturbances, or discharge  ENMT:  No difficulty hearing, tinnitus, vertigo; No sinus or throat pain  NECK: No pain or stiffness  RESPIRATORY: No cough, wheezing, chills or hemoptysis; No shortness of breath  CARDIOVASCULAR: No chest pain, palpitations, dizziness, or leg swelling  GASTROINTESTINAL: No abdominal or epigastric pain. No nausea, vomiting, or hematemesis; No diarrhea or constipation. No melena or hematochezia.  GENITOURINARY: No dysuria, frequency, hematuria, or incontinence  NEUROLOGICAL: No headaches, memory loss, loss of strength, numbness, or tremors  SKIN: No itching, burning, rashes, or lesions   MUSCULOSKELETAL: right shoulder joint pain   PSYCHIATRIC: No depression, anxiety, mood swings, or difficulty sleeping      Medications:  ceFAZolin   IVPB 2000 milliGRAM(s) IV Intermittent every 8 hours        acetaminophen  IVPB .. 1000 milliGRAM(s) IV Intermittent every 6 hours  celecoxib 200 milliGRAM(s) Oral every 12 hours  HYDROmorphone  Injectable 0.5 milliGRAM(s) IV Push every 3 hours PRN  ondansetron Injectable 4 milliGRAM(s) IV Push every 6 hours PRN  oxyCODONE    IR 5 milliGRAM(s) Oral every 3 hours PRN  oxyCODONE    IR 10 milliGRAM(s) Oral every 3 hours PRN        aluminum hydroxide/magnesium hydroxide/simethicone Suspension 30 milliLiter(s) Oral four times a day PRN  docusate sodium 100 milliGRAM(s) Oral three times a day  magnesium hydroxide Suspension 30 milliLiter(s) Oral daily PRN  pantoprazole    Tablet 40 milliGRAM(s) Oral before breakfast  polyethylene glycol 3350 17 Gram(s) Oral daily PRN  senna 2 Tablet(s) Oral at bedtime        lactated ringers. 1000 milliLiter(s) IV Continuous <Continuous>                ICU Vital Signs Last 24 Hrs  T(C): 36.6 (11 Mar 2019 07:03), Max: 36.6 (11 Mar 2019 07:03)  T(F): 97.8 (11 Mar 2019 07:03), Max: 97.8 (11 Mar 2019 07:03)  HR: 92 (11 Mar 2019 15:35) (77 - 94)  BP: 115/76 (11 Mar 2019 15:35) (102/57 - 130/77)  BP(mean): --  ABP: --  ABP(mean): --  RR: 14 (11 Mar 2019 15:25) (14 - 22)  SpO2: 96% (11 Mar 2019 15:35) (87% - 100%)    Vital Signs Last 24 Hrs  T(C): 36.6 (11 Mar 2019 07:03), Max: 36.6 (11 Mar 2019 07:03)  T(F): 97.8 (11 Mar 2019 07:03), Max: 97.8 (11 Mar 2019 07:03)  HR: 92 (11 Mar 2019 15:35) (77 - 94)  BP: 115/76 (11 Mar 2019 15:35) (102/57 - 130/77)  BP(mean): --  RR: 14 (11 Mar 2019 15:25) (14 - 22)  SpO2: 96% (11 Mar 2019 15:35) (87% - 100%)        I&O's Detail    11 Mar 2019 07:01  -  11 Mar 2019 19:08  --------------------------------------------------------  IN:    lactated ringers.: 1050 mL    Oral Fluid: 400 mL  Total IN: 1450 mL    OUT:    Estimated Blood Loss: 200 mL    Voided: 500 mL  Total OUT: 700 mL    Total NET: 750 mL            LABS:                CAPILLARY BLOOD GLUCOSE      POCT Blood Glucose.: 131 mg/dL (11 Mar 2019 13:07)        CULTURES:      Physical Examination:    General: No acute distress.      HEENT: Pupils equal, reactive to light.  Symmetric.    PULM: Clear to auscultation bilaterally, no significant sputum production    CVS: Regular rate and rhythm, no murmurs, rubs, or gallops    ABD: Soft, nondistended, nontender, normoactive bowel sounds, no masses    EXT: No edema, nontender calves; Right shoulder bandaged    SKIN: Warm and well perfused, no rashes noted.    NEURO: Alert, oriented, interactive, nonfocal    RADIOLOGY: ***    CRITICAL CARE TIME SPENT: ***

## 2019-03-11 NOTE — DISCHARGE NOTE PROVIDER - NSDCCPCAREPLAN_GEN_ALL_CORE_FT
PRINCIPAL DISCHARGE DIAGNOSIS  Problem: Primary osteoarthritis of right shoulder  Assessment and Plan of Treatment: DO NOT bear weight on operative extremity.  Sling for comfort and ambulating only.   Remove sling to exercise elbow and shoulder.  Full AAROM of shoulder with ER to 30 degrees; IR as tolerated.  May do Codman's and pendulums.  Keep incision clean and dry.  Change the dressing daily if there is drainage noted.  When there is no drainage the wound may be open to air.   The wound is closed with  Prineo (glued on mesh tape.)  Prineo is removed 2 weeks after surgery at rehab facility or in surgeon's office.  If there is no wet drainage you may shower and pat dry with a clean towel.  - Call your doctor if you experience:  • An increase in pain not controlled by pain medication or change in activity or  position.  • Temperature greater than 101° F.  • Redness, increased swelling or foul smelling drainage from or around the  incision.  • Numbness, tingling or a change in color or temperature of the operative leg.  • Call your doctor immediately if you experience chest pain, shortness of breath or calf pain.

## 2019-03-11 NOTE — BRIEF OPERATIVE NOTE - PROCEDURE
<<-----Click on this checkbox to enter Procedure Right total shoulder arthroplasty (TSA) using cement  03/11/2019    Active  LOGATA

## 2019-03-11 NOTE — PHYSICAL THERAPY INITIAL EVALUATION ADULT - GENERAL OBSERVATIONS, REHAB EVAL
Pt received supine in bed. All lines intact. Pt agreeable to physical therapy. + IV + telemetry + right UE arm sling

## 2019-03-11 NOTE — DISCHARGE NOTE PROVIDER - NSDCHC_MEDRECSTATUS_GEN_ALL_CORE
Admission Reconciliation is Completed  Discharge Reconciliation is Completed Admission Reconciliation is Completed  Discharge Reconciliation is Not Complete

## 2019-03-11 NOTE — DISCHARGE NOTE PROVIDER - NSDCACTIVITY_GEN_ALL_CORE
Stairs allowed/Showering allowed/Walking - Indoors allowed/Walking - Outdoors allowed/No heavy lifting/straining

## 2019-03-11 NOTE — DISCHARGE NOTE PROVIDER - HOSPITAL COURSE
The patient is a 53 y.o. male with severe osteoarthritis of the right shoulder.    After admission on 3/11/19,and receiving pre-operative parenteral prophylactic antibiotics (ancef), the patient  underwent an  uncomplicated right total shoulder replacement by orthopedic surgeon Dr. Devaughn Castaneda.      A medical consultation from the Hospitalist service was obtained for post-operative medical co-management. Typical Physical & occupational therapy modalities post TSA were performed including ambulation training, range of motion, ADL's, and transfers. Ecotrin, along with venodynes, was given for DVT prophylaxis.    The patient had a clean appearing surgical incision with no sign of surgical site infections and had a stable neuro / vascular exam of the operated extremity.  After progression of mobility guided by the PT/ OT staff,  the patient was felt to benefit from further rehabilitative care for restoration to level of function. This was felt to best be accomplished at home.  Discharge and Orthopedic Care instructions were delineated in the Discharge Plan and reviewed with the patient. All medications were delineated in the medication reconciliation tool and key points were reviewed with the patient. They were deemed stable from an Orthopedic & medical standpoint for discharge today.    Upon completion of Home care  they will be  following up with Dr. Castaneda for office  follow up Orthopedic care.

## 2019-03-11 NOTE — DISCHARGE NOTE PROVIDER - CARE PROVIDER_API CALL
Alec Castaneda)  Orthopaedic Sports Medicine; Orthopaedic Surgery  [address] [address]  Phone: [phone]  Fax: [phone]  Follow Up Time: 2 weeks Patient

## 2019-03-11 NOTE — PHYSICAL THERAPY INITIAL EVALUATION ADULT - RANGE OF MOTION EXAMINATION, REHAB EVAL
Left UE ROM was WNL (within normal limits)/right UE n/t due to recent sx/deficits as listed below/bilateral upper extremity ROM was WNL (within normal limits)

## 2019-03-11 NOTE — BRIEF OPERATIVE NOTE - COMMENTS
implants : Vinobo comprehensive shoulder : glenoid medium 4mm cemented, humerus # 12 stem w/ [id number] humeral head

## 2019-03-12 ENCOUNTER — TRANSCRIPTION ENCOUNTER (OUTPATIENT)
Age: 54
End: 2019-03-12

## 2019-03-12 VITALS
TEMPERATURE: 98 F | SYSTOLIC BLOOD PRESSURE: 105 MMHG | HEART RATE: 79 BPM | RESPIRATION RATE: 16 BRPM | OXYGEN SATURATION: 94 % | DIASTOLIC BLOOD PRESSURE: 65 MMHG

## 2019-03-12 LAB
ANION GAP SERPL CALC-SCNC: 7 MMOL/L — SIGNIFICANT CHANGE UP (ref 5–17)
BUN SERPL-MCNC: 15 MG/DL — SIGNIFICANT CHANGE UP (ref 7–23)
CALCIUM SERPL-MCNC: 8.1 MG/DL — LOW (ref 8.4–10.5)
CHLORIDE SERPL-SCNC: 102 MMOL/L — SIGNIFICANT CHANGE UP (ref 96–108)
CO2 SERPL-SCNC: 26 MMOL/L — SIGNIFICANT CHANGE UP (ref 22–31)
CREAT SERPL-MCNC: 0.94 MG/DL — SIGNIFICANT CHANGE UP (ref 0.5–1.3)
GLUCOSE SERPL-MCNC: 134 MG/DL — HIGH (ref 70–99)
HCT VFR BLD CALC: 38.7 % — LOW (ref 39–50)
HGB BLD-MCNC: 12.8 G/DL — LOW (ref 13–17)
MCHC RBC-ENTMCNC: 27.9 PG — SIGNIFICANT CHANGE UP (ref 27–34)
MCHC RBC-ENTMCNC: 33.1 GM/DL — SIGNIFICANT CHANGE UP (ref 32–36)
MCV RBC AUTO: 84.3 FL — SIGNIFICANT CHANGE UP (ref 80–100)
NRBC # BLD: 0 /100 WBCS — SIGNIFICANT CHANGE UP (ref 0–0)
PLATELET # BLD AUTO: 260 K/UL — SIGNIFICANT CHANGE UP (ref 150–400)
POTASSIUM SERPL-MCNC: 4.1 MMOL/L — SIGNIFICANT CHANGE UP (ref 3.5–5.3)
POTASSIUM SERPL-SCNC: 4.1 MMOL/L — SIGNIFICANT CHANGE UP (ref 3.5–5.3)
RBC # BLD: 4.59 M/UL — SIGNIFICANT CHANGE UP (ref 4.2–5.8)
RBC # FLD: 13 % — SIGNIFICANT CHANGE UP (ref 10.3–14.5)
SODIUM SERPL-SCNC: 135 MMOL/L — SIGNIFICANT CHANGE UP (ref 135–145)
WBC # BLD: 10.51 K/UL — HIGH (ref 3.8–10.5)
WBC # FLD AUTO: 10.51 K/UL — HIGH (ref 3.8–10.5)

## 2019-03-12 PROCEDURE — 88304 TISSUE EXAM BY PATHOLOGIST: CPT

## 2019-03-12 PROCEDURE — 97116 GAIT TRAINING THERAPY: CPT

## 2019-03-12 PROCEDURE — 85027 COMPLETE CBC AUTOMATED: CPT

## 2019-03-12 PROCEDURE — 97161 PT EVAL LOW COMPLEX 20 MIN: CPT

## 2019-03-12 PROCEDURE — 36415 COLL VENOUS BLD VENIPUNCTURE: CPT

## 2019-03-12 PROCEDURE — 94664 DEMO&/EVAL PT USE INHALER: CPT

## 2019-03-12 PROCEDURE — C1713: CPT

## 2019-03-12 PROCEDURE — 97165 OT EVAL LOW COMPLEX 30 MIN: CPT

## 2019-03-12 PROCEDURE — 80048 BASIC METABOLIC PNL TOTAL CA: CPT

## 2019-03-12 PROCEDURE — 88311 DECALCIFY TISSUE: CPT

## 2019-03-12 PROCEDURE — C1776: CPT

## 2019-03-12 PROCEDURE — 94660 CPAP INITIATION&MGMT: CPT

## 2019-03-12 PROCEDURE — 73030 X-RAY EXAM OF SHOULDER: CPT

## 2019-03-12 PROCEDURE — 82962 GLUCOSE BLOOD TEST: CPT

## 2019-03-12 PROCEDURE — 73020 X-RAY EXAM OF SHOULDER: CPT

## 2019-03-12 RX ORDER — OXYCODONE HYDROCHLORIDE 5 MG/1
15 TABLET ORAL
Qty: 0 | Refills: 0 | Status: DISCONTINUED | OUTPATIENT
Start: 2019-03-12 | End: 2019-03-12

## 2019-03-12 RX ORDER — OXYCODONE HYDROCHLORIDE 5 MG/1
10 TABLET ORAL
Qty: 0 | Refills: 0 | Status: DISCONTINUED | OUTPATIENT
Start: 2019-03-12 | End: 2019-03-12

## 2019-03-12 RX ADMIN — OXYCODONE HYDROCHLORIDE 15 MILLIGRAM(S): 5 TABLET ORAL at 09:10

## 2019-03-12 RX ADMIN — Medication 1000 MILLIGRAM(S): at 08:42

## 2019-03-12 RX ADMIN — Medication 100 MILLIGRAM(S): at 05:28

## 2019-03-12 RX ADMIN — OXYCODONE HYDROCHLORIDE 15 MILLIGRAM(S): 5 TABLET ORAL at 08:40

## 2019-03-12 RX ADMIN — Medication 1000 MILLIGRAM(S): at 02:18

## 2019-03-12 RX ADMIN — HYDROMORPHONE HYDROCHLORIDE 0.5 MILLIGRAM(S): 2 INJECTION INTRAMUSCULAR; INTRAVENOUS; SUBCUTANEOUS at 02:03

## 2019-03-12 RX ADMIN — Medication 100 MILLIGRAM(S): at 13:06

## 2019-03-12 RX ADMIN — HYDROMORPHONE HYDROCHLORIDE 0.5 MILLIGRAM(S): 2 INJECTION INTRAMUSCULAR; INTRAVENOUS; SUBCUTANEOUS at 05:45

## 2019-03-12 RX ADMIN — CELECOXIB 200 MILLIGRAM(S): 200 CAPSULE ORAL at 08:42

## 2019-03-12 RX ADMIN — PANTOPRAZOLE SODIUM 40 MILLIGRAM(S): 20 TABLET, DELAYED RELEASE ORAL at 05:28

## 2019-03-12 RX ADMIN — CELECOXIB 200 MILLIGRAM(S): 200 CAPSULE ORAL at 08:40

## 2019-03-12 RX ADMIN — Medication 1000 MILLIGRAM(S): at 08:40

## 2019-03-12 RX ADMIN — OXYCODONE HYDROCHLORIDE 15 MILLIGRAM(S): 5 TABLET ORAL at 13:02

## 2019-03-12 RX ADMIN — Medication 81 MILLIGRAM(S): at 05:28

## 2019-03-12 RX ADMIN — HYDROMORPHONE HYDROCHLORIDE 0.5 MILLIGRAM(S): 2 INJECTION INTRAMUSCULAR; INTRAVENOUS; SUBCUTANEOUS at 05:29

## 2019-03-12 RX ADMIN — HYDROMORPHONE HYDROCHLORIDE 0.5 MILLIGRAM(S): 2 INJECTION INTRAMUSCULAR; INTRAVENOUS; SUBCUTANEOUS at 02:23

## 2019-03-12 RX ADMIN — Medication 400 MILLIGRAM(S): at 02:03

## 2019-03-12 NOTE — OCCUPATIONAL THERAPY INITIAL EVALUATION ADULT - ORIENTATION, REHAB EVAL
oriented to person, place, time and situation oriented to person, place, time and situation/Patient educated verbally regarding role of OT,  UE non-weight bearing status & pt. provided with education folder including functional exercises, TSA education/precautions & caregiver guide pamphlet. Pt educated re d/c plan & DME needs (SW/case mgmt notified via sunrise) . Pts spouse present

## 2019-03-12 NOTE — DISCHARGE NOTE NURSING/CASE MANAGEMENT/SOCIAL WORK - NSDCPNDISPN_GEN_ALL_CORE
Education provided on the pain management plan of care/Activities of daily living, including home environment that might     exacerbate pain or reduce effectiveness of the pain management plan of care as well as strategies to address these issues/Side effects of pain management treatment/Safe use, storage and disposal of opioids when prescribed

## 2019-03-12 NOTE — DISCHARGE NOTE NURSING/CASE MANAGEMENT/SOCIAL WORK - NSDCDPATPORTLINK_GEN_ALL_CORE
You can access the [facility] Patient Portal, offered by [facility], by registering with the following website: [email or web address]

## 2019-03-12 NOTE — DISCHARGE NOTE NURSING/CASE MANAGEMENT/SOCIAL WORK - NSDCPEEMAIL_GEN_ALL_CORE
[facility] email [email or web address]

## 2019-03-12 NOTE — PROGRESS NOTE ADULT - ASSESSMENT
Pt stable postop right shoulder replacement.  Hx DONG on cpap  Advised fu Pulmonologist.  Use cpap hs.     Problem/Recommendation - 1:  Problem: R/O Sleep apnea. Recommendation: Cpap hs.     Problem/Recommendation - 2:  ·  Problem: Primary osteoarthritis of right shoulder.  Recommendation: PT.      Problem/Recommendation - 3:  ·  Problem: Obesity (BMI 30.0-34.9).  Recommendation: lose wt.      Problem/Recommendation - 4:  ·  Problem: Sarcoidosis.  Recommendation: observe.

## 2019-03-12 NOTE — OCCUPATIONAL THERAPY INITIAL EVALUATION ADULT - ADDITIONAL COMMENTS
Pt lives in private home with 2 VONDA no HR and 1 flight +HR to bedroom Pt lives in private home with 2 VONDA no HR and 1 flight +HR to bedroom. + stall shower with grab bar

## 2019-03-12 NOTE — OCCUPATIONAL THERAPY INITIAL EVALUATION ADULT - RANGE OF MOTION EXAMINATION, UPPER EXTREMITY
right UE WNLs. + left UE in sling, removed by OT. Pt educted verbally & given TSA packet including HEP/precautions & pendulum ex ( packet reviewed by  who stated ok). NO AROM right shoulder , no ext right UE past 30 deg. AAROM ok (pt only able to tolerate AAROM ff ~ 40 deg with + pain), no shoulder abd, _ AROM elbow to digits & + pendulum ex. Pt able to perform all HEP following vc's . Spouse present & states she is aware of restrictions/HEP, sling use & how to don /doff. left UE WNLs. + right UE in sling, removed by OT. Pt educted verbally & given TSA packet including HEP/precautions & pendulum ex ( packet reviewed by  who stated ok). NO AROM right shoulder , no ext right UE past 30 deg. AAROM ok (pt only able to tolerate AAROM ff ~ 40 deg with + pain), no shoulder abd, _ AROM elbow to digits WNLs & + pendulum ex. Pt able to perform all HEP following vc's . Spouse present & states she is aware of restrictions/HEP, sling use & how to don /doff.

## 2019-03-12 NOTE — PROGRESS NOTE ADULT - SUBJECTIVE AND OBJECTIVE BOX
ORTHOPEDIC PA PROGRESS NOTE  NAHID GRIMES      53y Male                                                                                                                               POD #        STATUS POST:               Pre-Op Dx: Primary osteoarthritis of right shoulder    Post-Op Dx:  Primary osteoarthritis of right shoulder    Procedure: Right total shoulder arthroplasty (TSA) using cement                                                Pain (0-10): 8  Current Pain Management:  [ ] PCA   [ X] Po Analgesics [ ] IM /IV Anagesics   pt notes that PO oxycodone has not been helping however IV dilaudid has been  He notes numbness in his foot    T(F): 98  HR: 78  BP: 106/66  RR: 14  SpO2: 98%                        12.8   10.51 )-----------( 260      ( 12 Mar 2019 07:19 )             38.7                           Physical Exam :    -   Dressing changed sterile.   -   Wound C/D/I.   -   Distal Neurvascular status intact grossly.   -   Warm well perfused; capillary refill <3 seconds   -   median/ulnar/radial nerve intact, AIN/PIN intact  -   (+) Sensation to light touch  -   (-) Calf tenderness Bilaterally  - decreased sensation over deep peroneal nerve between 1st and 2nd toe, sensation intact along other distal dermatomes, EHL/FHL/TA intact      A/P: 53y Male s/p Primary osteoarthritis of right shoulder    -   Ortho Stable  -   Pain control - will increase pain meds  -   Medicine to follow  -   DVT ppx:     [X ]SCDs     [X ] ASA     [ ] Eliquis     [ ] Lovenox  -   Weight bearing status:  WBAT [X ]        PWB    [ ]     TTWB  [ ]      NWB  [ ]   -  Dispo:     Home [X ]     Acute Rehab [ ]     JACOB [ ]     TBD [ ]

## 2019-03-12 NOTE — OCCUPATIONAL THERAPY INITIAL EVALUATION ADULT - NS ASR WT BEARING DETAIL RUE
nonweight-bearing As per  + verbal orders that right UE slight to be worn for ambulation & comfort only./nonweight-bearing

## 2019-03-12 NOTE — PROGRESS NOTE ADULT - SUBJECTIVE AND OBJECTIVE BOX
PULMONARY/CRITICAL CARE      INTERVAL HPI/OVERNIGHT EVENTS:    53y MaleHPI:    Doing well.  Ambulated  Some pain right shoulder.  Wore cpap    PAST MEDICAL & SURGICAL HISTORY:  Chronic lower back pain  Obesity (BMI 30.0-34.9)  BMI 33.0-33.9,adult  Herniated lumbar intervertebral disc  Sarcoidosis  Osteoarthritis of right shoulder  Sleep apnea: CPAP  Chronic lower back pain  History of hernia repair: ventral 2018  History of hemorrhoidectomy: 2018  Chronic back pain greater than 3 months duration: 2/26/ 2013 temporary placement of generator,  removed 2013  S/P knee surgery: right  2004        ICU Vital Signs Last 24 Hrs  T(C): 36.3 (12 Mar 2019 07:52), Max: 36.8 (11 Mar 2019 23:05)  T(F): 97.3 (12 Mar 2019 07:52), Max: 98.3 (11 Mar 2019 23:05)  HR: 70 (12 Mar 2019 07:52) (70 - 94)  BP: 108/72 (12 Mar 2019 07:52) (102/57 - 130/77)  BP(mean): --  ABP: --  ABP(mean): --  RR: 16 (12 Mar 2019 07:52) (14 - 21)  SpO2: 95% (12 Mar 2019 07:52) (87% - 100%)    Qtts:     I&O's Summary    11 Mar 2019 07:01  -  12 Mar 2019 07:00  --------------------------------------------------------  IN: 1450 mL / OUT: 700 mL / NET: 750 mL            REVIEW OF SYSTEMS:    CONSTITUTIONAL: No fever, weight loss, or fatigue  EYES: No eye pain, visual disturbances, or discharge  ENMT:  No difficulty hearing, tinnitus, vertigo; No sinus or throat pain  NECK: No pain or stiffness  BREASTS: No pain, masses, or nipple discharge  RESPIRATORY: No cough, wheezing, chills or hemoptysis; No shortness of breath  CARDIOVASCULAR: No chest pain, palpitations, dizziness, or leg swelling  GASTROINTESTINAL: No abdominal or epigastric pain. No nausea, vomiting, or hematemesis; No diarrhea or constipation. No melena or hematochezia.  GENITOURINARY: No dysuria, frequency, hematuria, or incontinence  NEUROLOGICAL: No headaches, memory loss, loss of strength, numbness, or tremors  SKIN: No itching, burning, rashes, or lesions   LYMPH NODES: No enlarged glands  ENDOCRINE: No heat or cold intolerance; No hair loss  MUSCULOSKELETAL: right shoulder  joint pain No muscle, back, or extremity pain, no calf tenderness  PSYCHIATRIC: No depression, anxiety, mood swings, or difficulty sleeping  HEME/LYMPH: No easy bruising, or bleeding gums  ALLERGY AND IMMUNOLOGIC: No hives or eczema      PHYSICAL EXAM:    GENERAL: NAD, well-groomed, well-developed, NAD  HEAD:  Atraumatic, Normocephalic  EYES: EOMI, PERRLA, conjunctiva and sclera clear  ENMT: No tonsillar erythema, exudates, or enlargement; Moist mucous membranes, Good dentition, No lesions  NECK: Supple, No JVD, Normal thyroid  NERVOUS SYSTEM:  Alert & Oriented X3, Good concentration; Motor Strength 5/5 B/L upper and lower extremities  CHEST/LUNG: Clear to percussion bilaterally; No rales, rhonchi, wheezing, or rubs  HEART: Regular rate and rhythm; No murmurs, rubs, or gallops  ABDOMEN: Soft, Nontender, Nondistended; Bowel sounds present  EXTREMITIES:  2+ Peripheral Pulses, No clubbing, cyanosis, or edema  Right shoulder bandaged  LYMPH: No lymphadenopathy noted  SKIN: No rashes or lesions        LABS:                        12.8   10.51 )-----------( 260      ( 12 Mar 2019 07:19 )             38.7     03-12    135  |  102  |  15  ----------------------------<  134<H>  4.1   |  26  |  0.94    Ca    8.1<L>      12 Mar 2019 07:19            vanco through     RADIOLOGY & ADDITIONAL STUDIES:      CRITICAL CARE TIME SPENT:

## 2019-03-12 NOTE — OCCUPATIONAL THERAPY INITIAL EVALUATION ADULT - PRECAUTIONS/LIMITATIONS, REHAB EVAL
surgical precautions/fall precautions "Total Shoulder Protocol"/surgical precautions/fall precautions

## 2019-03-12 NOTE — OCCUPATIONAL THERAPY INITIAL EVALUATION ADULT - LEVEL OF INDEPENDENCE: DRESS UPPER BODY, OT EVAL
pt in hosp gown with tele monitor. Pt/spouse aware of technique & to wear loose, [place] shirts. Spouse states she will assist.

## 2019-03-12 NOTE — OCCUPATIONAL THERAPY INITIAL EVALUATION ADULT - PREDICTED DURATION OF THERAPY (DAYS/WKS), OT EVAL
Patient cleared from OT services, with recommended DME for safety. Anticipated d/c home with assist  pending medical & PT clearance.

## 2019-03-12 NOTE — DISCHARGE NOTE NURSING/CASE MANAGEMENT/SOCIAL WORK - NSDCPEWEB_GEN_ALL_CORE
[place] website --- [email or web address] for Tobacco Control website --- [email or web address]

## 2019-03-14 LAB — SURGICAL PATHOLOGY STUDY: SIGNIFICANT CHANGE UP

## 2019-03-26 ENCOUNTER — APPOINTMENT (OUTPATIENT)
Dept: ORTHOPEDIC SURGERY | Facility: CLINIC | Age: 54
End: 2019-03-26
Payer: COMMERCIAL

## 2019-03-26 VITALS — HEIGHT: 65 IN | BODY MASS INDEX: 34.66 KG/M2 | WEIGHT: 208 LBS

## 2019-03-26 PROCEDURE — 99024 POSTOP FOLLOW-UP VISIT: CPT

## 2019-03-26 PROCEDURE — 73030 X-RAY EXAM OF SHOULDER: CPT | Mod: RT

## 2019-03-26 NOTE — END OF VISIT
[FreeTextEntry3] : All medical record entries made by the Edwinibe were at my, Dr. Alec Castaneda, direction and personally dictated by me on 03/26/2019. I have reviewed the chart and agree that the record accurately reflects my personal performance of the history, physical exam, assessment and plan. I have also personally directed, reviewed, and agreed with the chart.

## 2019-03-26 NOTE — ADDENDUM
[FreeTextEntry1] : I, Lizette Flores, acted solely as a scribe for Dr. Alec Castaneda on this date 03/26/2019.

## 2019-03-26 NOTE — HISTORY OF PRESENT ILLNESS
[___ Days Post Op] : post op day #[unfilled] [de-identified] : s/p Right total shoulder arthroplasty on 3/11/2019. [de-identified] : 53 year old male presents for a post operative evaluation s/p Right total shoulder arthroplasty on 3/11/2019. Today he is wearing a sling to the office, he notes that he has been doing well post operatively and that his pain has been well controlled. He ceased use of the oxycodone and says that he has had 2 in home physical therapy sessions. He has no signs of infections and denies fever, chills, nausea, or vomiting.  [de-identified] : Right Upper Extremity\par o Shoulder :\par ¦ Inspection/Palpation : generalized tenderness about the shoulder, mild swelling, no deformity, incision well healing with Prineo in place. Wound is clean, dry, and intact with no discharge or dehiscence .\par ¦ Range of Motion : PASSIVE FORWARD ELEVATION: Measured at 100 degrees, ACTIVE EXTERNAL ROTATION: Measured at 0 degrees\par o Upper Arm : no tenderness, no swelling, no deformities\par o Muscle Bulk : no atrophy \par o Sensation : sensation intact to light touch \par o Skin : no skin rash, no discoloration \par o Vascular Exam : no edema, no cyanosis, radial and ulnar pulses normal  [de-identified] : o  Right Shoulder : Grashey, Axillary and Outlet views were obtained, s/p right total shoulder arthroplasty, prosthesis in excellent positioning with no signs of loosening, no acute fractures [de-identified] : Prineo was removed and Steri-Strips were placed. He was instructed to allow the Steri-Strips to fall off on their own. \par \par He is to begin outpatient physical therapy, a prescription was provided. \par \par He is to continue use of the sling.\par \par Activity modifications and restrictions were discussed. \par \par A prescription for Tramadol was provided.\par \par FU 4 weeks.

## 2019-04-09 ENCOUNTER — RX RENEWAL (OUTPATIENT)
Age: 54
End: 2019-04-09

## 2019-04-16 ENCOUNTER — APPOINTMENT (OUTPATIENT)
Dept: ORTHOPEDIC SURGERY | Facility: CLINIC | Age: 54
End: 2019-04-16
Payer: COMMERCIAL

## 2019-04-16 VITALS — WEIGHT: 208 LBS | HEIGHT: 65 IN | BODY MASS INDEX: 34.66 KG/M2

## 2019-04-16 PROCEDURE — 99024 POSTOP FOLLOW-UP VISIT: CPT

## 2019-04-16 NOTE — ADDENDUM
[FreeTextEntry1] : I, Lizette Flores, acted solely as a scribe for Dr. Alec Castaneda on this date 04/16/2019.

## 2019-04-16 NOTE — HISTORY OF PRESENT ILLNESS
[___ Weeks Post Op] : [unfilled] weeks post op [de-identified] : s/p Right total shoulder arthroplasty on 3/11/2019. [de-identified] : 53 year old male presents for a post operative evaluation s/p Right total shoulder arthroplasty on 3/11/2019. He has been doing well but notes intermittent pain that travels down his arm. He has been having some difficulty sleeping at night and has been taking Tramadol as help with the pain. He has been attending physical therapy 3 days per week and is no longer utilizing a sling, he notes improvements in strength and ROM. He has no signs of infections and denies fever, chills, nausea, or vomiting.  [de-identified] : Right Upper Extremity\par o Shoulder :\par ¦ Inspection/Palpation : generalized tenderness about the shoulder, no swelling, no deformity. Incision well healed, clean, dry, and intact with no discharge or dehiscence.\par ¦ Range of Motion : PASSIVE FORWARD ELEVATION: Measured at 120 degrees, ACTIVE FORWARD ELEVATION: Measured at 115 degrees, ACTIVE EXTERNAL ROTATION: Measured at 20 degrees, ACTIVE INTERNAL ROTATION:Measured at L5\par ¦ Strength : external rotation 5/5, internal rotation 5/5, supraspinatus 5-/5\par ¦ Stability : no joint instability on provocative testing\par o Upper Arm : no tenderness, no swelling, no deformities\par o Muscle Bulk : no atrophy \par o Sensation : sensation intact to light touch \par o Skin : no skin rash, no discoloration \par o Vascular Exam : no edema, no cyanosis, radial and ulnar pulses normal  [de-identified] : o  Right Shoulder : Grashey, Axillary and Outlet views were obtained, s/p right total shoulder arthroplasty, prosthesis in excellent positioning with no signs of loosening, no acute fractures [de-identified] : He is to continue with physical therapy.\par \par Activity modifications and restrictions were discussed. \par \par FU 6 weeks

## 2019-04-16 NOTE — END OF VISIT
[FreeTextEntry3] : All medical record entries made by the Edwinibe were at my, Dr. Alec Castaneda, direction and personally dictated by me on 04/16/2019. I have reviewed the chart and agree that the record accurately reflects my personal performance of the history, physical exam, assessment and plan. I have also personally directed, reviewed, and agreed with the chart.

## 2019-05-28 ENCOUNTER — APPOINTMENT (OUTPATIENT)
Dept: ORTHOPEDIC SURGERY | Facility: CLINIC | Age: 54
End: 2019-05-28
Payer: COMMERCIAL

## 2019-05-28 VITALS — HEIGHT: 65 IN | BODY MASS INDEX: 34.66 KG/M2 | WEIGHT: 208 LBS

## 2019-05-28 PROCEDURE — 99024 POSTOP FOLLOW-UP VISIT: CPT

## 2019-05-28 NOTE — END OF VISIT
[FreeTextEntry3] : All medical record entries made by the Edwinibe were at my, Dr. Alec Castaneda, direction and personally dictated by me on 05/28/2019. I have reviewed the chart and agree that the record accurately reflects my personal performance of the history, physical exam, assessment and plan. I have also personally directed, reviewed, and agreed with the chart.

## 2019-05-28 NOTE — ADDENDUM
[FreeTextEntry1] : I, Lizette Flores, acted solely as a scribe for Dr. Alec Castaneda on this date 05/28/2019.

## 2019-05-28 NOTE — HISTORY OF PRESENT ILLNESS
[___ Weeks Post Op] : [unfilled] weeks post op [de-identified] : 53 year old male presents for a post operative evaluation s/p Right total shoulder arthroplasty on 3/11/2019. He has been doing well and reports that his pain has been well controlled and he has been experiencing minimal discomfort about his shoulder. He has been attending physical therapy and notes improvements in strength and ROM. He says that he has been experiencing pain about his elbow when perform tasks such as washing his hair. He has no signs of infections and denies fever, chills, nausea, or vomiting. He would like to schedule a left TSA for July 2019.  [de-identified] : s/p Right total shoulder arthroplasty on 3/11/2019. [de-identified] : Right Upper Extremity\par o Shoulder :\par ¦ Inspection/Palpation : no tenderness about the shoulder, no swelling, no deformity. Incisions well healed, clean, dry, and intact with no discharge or dehiscence.\par ¦ Range of Motion : ACTIVE FORWARD ELEVATION: Measured at 120 degrees, ACTIVE EXTERNAL ROTATION: Measured at 35 degrees, ACTIVE INTERNAL ROTATION:Measured at L5\par ¦ Strength : external rotation 5/5, internal rotation 5/5, supraspinatus 5/5\par ¦ Stability : no joint instability on provocative testing\par ¦ Tests and Signs: Belly Press Test (-) \par o Upper Arm : no tenderness, no swelling, no deformities\par o Muscle Bulk : no atrophy \par o Sensation : sensation intact to light touch \par o Skin : no skin rash, no discoloration \par o Vascular Exam : no edema, no cyanosis, radial and ulnar pulses normal  [de-identified] : He is to continue with physical therapy, a prescription was provided. \par \par FU 2 weeks for his left shoulder.

## 2019-06-03 ENCOUNTER — APPOINTMENT (OUTPATIENT)
Dept: ORTHOPEDIC SURGERY | Facility: CLINIC | Age: 54
End: 2019-06-03
Payer: COMMERCIAL

## 2019-06-03 ENCOUNTER — FORM ENCOUNTER (OUTPATIENT)
Age: 54
End: 2019-06-03

## 2019-06-03 VITALS — WEIGHT: 208 LBS | BODY MASS INDEX: 34.66 KG/M2 | HEIGHT: 65 IN

## 2019-06-03 PROCEDURE — 99214 OFFICE O/P EST MOD 30 MIN: CPT | Mod: 24

## 2019-06-03 PROCEDURE — 73030 X-RAY EXAM OF SHOULDER: CPT | Mod: LT

## 2019-06-03 NOTE — PHYSICAL EXAM
[Normal LUE] : Left Upper Extremity: No scars, rashes, lesions, ulcers, skin intact [Normal Touch] : sensation intact for touch [Normal] : No swelling, no edema, normal pedal pulses and normal temperature [Poor Appearance] : well-appearing [Acute Distress] : not in acute distress [de-identified] : Right Upper Extremity\par o Shoulder :\par ¦ Inspection/Palpation : no tenderness, no swelling, no deformities, surgical scars well appearing \par ¦ Range of Motion : ACTIVE FORWARD ELEVATION: Measured at 130 degrees, ACTIVE EXTERNAL ROTATION: Measured at 40 degrees, ACTIVE INTERNAL ROTATION: Measured at L3\par ¦ Strength : external rotation 5/5, internal rotation 5/5, supraspinatus 5-/5 \par ¦ Stability : no joint instability on provocative testing\par o Upper Arm : no tenderness, no swelling, no deformities\par o Muscle Bulk : no atrophy \par o Sensation : sensation intact to light touch \par o Skin : no skin rash or discoloration \par o Vascular Exam : no edema, no cyanosis, radial and ulnar pulses normal \par  \par Left Upper Extremity\par o Shoulder : \par ¦ Inspection/Palpation : diffuse tenderness, no swelling, no deformities\par ¦ Range of Motion : ACTIVE FORWARD ELEVATION: Measured at 115 degrees, ACTIVE EXTERNAL ROTATION: Measured at 35 degrees, ACTIVE INTERNAL ROTATION: Measured at L3\par ¦ Strength : external rotation 5/5, internal rotation 5/5, supraspinatus 5/5 \par ¦ Stability : no joint instability on provocative testing\par o Upper Arm : no tenderness, no swelling, no deformities\par o Muscle Bulk : no atrophy\par o Sensation : sensation intact to light touch\par o Skin : no skin rash or discoloration\par o Vascular Exam : no edema, no cyanosis, radial and ulnar pulses normal  [de-identified] : o  Left Shoulder : Grashey, Axillary, and Outlet views were obtained, there are no soft tissue abnormalities, no fractures, moderate to advanced glenohumeral osteoarthritis.

## 2019-06-03 NOTE — DISCUSSION/SUMMARY
[de-identified] : The underlying pathophysiology was reviewed in great detail with the patient as well as the various treatment options, including ice, analgesics, NSAIDs, Physical therapy, steroid injections, TSA.\par \par The patient wishes to proceed with SURGICAL INTERVENTION at this time. The risks and benefits of a LEFT TOTAL SHOULDER ARTHROPLASTY were discussed in great detail today, including but not limited to bleeding, infection, nerve injury, DVT, allergy to the anesthetic or to the implants, persistent pain, stiffness, scarring, swelling or deformity. \par \par A CT Scan of the left shoulder was ordered to evaluate the glenoid version prior to planning TSA.

## 2019-06-03 NOTE — HISTORY OF PRESENT ILLNESS
[de-identified] : 53 year old male presents for an evaluation of left shoulder pain, s/p Right total shoulder arthroplasty on 3/11/2019. He has been experiencing left shoulder pain that is a constant aching pain about his shoulder and he describes sharp pain with shoulder rotation and use of his left arm. He has been attending physical therapy for his right shoulder and notes improvements in strength and ROM. He is interested in discussing surgical options for his left shoulder pain.

## 2019-06-03 NOTE — ADDENDUM
[FreeTextEntry1] : I, Lizette Flores, acted solely as a scribe for Dr. Alec Castaneda on this date 06/03/2019.

## 2019-06-03 NOTE — END OF VISIT
[FreeTextEntry3] : All medical record entries made by the Edwinibyanna were at my, Dr. Alec Castaneda, direction and personally dictated by me on 06/03/2019. I have reviewed the chart and agree that the record accurately reflects my personal performance of the history, physical exam, assessment and plan. I have also personally directed, reviewed, and agreed with the chart.

## 2019-06-04 ENCOUNTER — APPOINTMENT (OUTPATIENT)
Dept: CT IMAGING | Facility: CLINIC | Age: 54
End: 2019-06-04
Payer: COMMERCIAL

## 2019-06-04 ENCOUNTER — OUTPATIENT (OUTPATIENT)
Dept: OUTPATIENT SERVICES | Facility: HOSPITAL | Age: 54
LOS: 1 days | End: 2019-06-04
Payer: COMMERCIAL

## 2019-06-04 DIAGNOSIS — Z00.8 ENCOUNTER FOR OTHER GENERAL EXAMINATION: ICD-10-CM

## 2019-06-04 DIAGNOSIS — Z98.890 OTHER SPECIFIED POSTPROCEDURAL STATES: Chronic | ICD-10-CM

## 2019-06-04 DIAGNOSIS — M19.011 PRIMARY OSTEOARTHRITIS, RIGHT SHOULDER: ICD-10-CM

## 2019-06-04 DIAGNOSIS — M54.5 LOW BACK PAIN: Chronic | ICD-10-CM

## 2019-06-04 DIAGNOSIS — M19.012 PRIMARY OSTEOARTHRITIS, LEFT SHOULDER: ICD-10-CM

## 2019-06-04 PROCEDURE — 73200 CT UPPER EXTREMITY W/O DYE: CPT | Mod: 26,LT

## 2019-06-04 PROCEDURE — 76376 3D RENDER W/INTRP POSTPROCES: CPT | Mod: 26

## 2019-06-04 PROCEDURE — 73200 CT UPPER EXTREMITY W/O DYE: CPT

## 2019-06-04 PROCEDURE — 76376 3D RENDER W/INTRP POSTPROCES: CPT

## 2019-07-05 ENCOUNTER — OUTPATIENT (OUTPATIENT)
Dept: OUTPATIENT SERVICES | Facility: HOSPITAL | Age: 54
LOS: 1 days | End: 2019-07-05
Payer: COMMERCIAL

## 2019-07-05 VITALS
HEIGHT: 66 IN | HEART RATE: 88 BPM | SYSTOLIC BLOOD PRESSURE: 137 MMHG | TEMPERATURE: 98 F | WEIGHT: 222.01 LBS | DIASTOLIC BLOOD PRESSURE: 88 MMHG | RESPIRATION RATE: 15 BRPM | OXYGEN SATURATION: 96 %

## 2019-07-05 DIAGNOSIS — Z98.890 OTHER SPECIFIED POSTPROCEDURAL STATES: Chronic | ICD-10-CM

## 2019-07-05 DIAGNOSIS — M19.012 PRIMARY OSTEOARTHRITIS, LEFT SHOULDER: ICD-10-CM

## 2019-07-05 DIAGNOSIS — M25.512 PAIN IN LEFT SHOULDER: ICD-10-CM

## 2019-07-05 DIAGNOSIS — Z96.611 PRESENCE OF RIGHT ARTIFICIAL SHOULDER JOINT: Chronic | ICD-10-CM

## 2019-07-05 DIAGNOSIS — Z01.818 ENCOUNTER FOR OTHER PREPROCEDURAL EXAMINATION: ICD-10-CM

## 2019-07-05 DIAGNOSIS — M54.5 LOW BACK PAIN: Chronic | ICD-10-CM

## 2019-07-05 LAB
ALBUMIN SERPL ELPH-MCNC: 4 G/DL — SIGNIFICANT CHANGE UP (ref 3.3–5)
ALP SERPL-CCNC: 98 U/L — SIGNIFICANT CHANGE UP (ref 30–120)
ALT FLD-CCNC: 44 U/L DA — SIGNIFICANT CHANGE UP (ref 10–60)
ANION GAP SERPL CALC-SCNC: 9 MMOL/L — SIGNIFICANT CHANGE UP (ref 5–17)
APTT BLD: 31.6 SEC — SIGNIFICANT CHANGE UP (ref 28.5–37)
AST SERPL-CCNC: 18 U/L — SIGNIFICANT CHANGE UP (ref 10–40)
BILIRUB SERPL-MCNC: 0.2 MG/DL — SIGNIFICANT CHANGE UP (ref 0.2–1.2)
BUN SERPL-MCNC: 14 MG/DL — SIGNIFICANT CHANGE UP (ref 7–23)
CALCIUM SERPL-MCNC: 9.4 MG/DL — SIGNIFICANT CHANGE UP (ref 8.4–10.5)
CHLORIDE SERPL-SCNC: 104 MMOL/L — SIGNIFICANT CHANGE UP (ref 96–108)
CO2 SERPL-SCNC: 25 MMOL/L — SIGNIFICANT CHANGE UP (ref 22–31)
CREAT SERPL-MCNC: 1.05 MG/DL — SIGNIFICANT CHANGE UP (ref 0.5–1.3)
GLUCOSE SERPL-MCNC: 155 MG/DL — HIGH (ref 70–99)
HCT VFR BLD CALC: 45.5 % — SIGNIFICANT CHANGE UP (ref 39–50)
HGB BLD-MCNC: 15.4 G/DL — SIGNIFICANT CHANGE UP (ref 13–17)
INR BLD: 1.12 RATIO — SIGNIFICANT CHANGE UP (ref 0.88–1.16)
MCHC RBC-ENTMCNC: 28.7 PG — SIGNIFICANT CHANGE UP (ref 27–34)
MCHC RBC-ENTMCNC: 33.8 GM/DL — SIGNIFICANT CHANGE UP (ref 32–36)
MCV RBC AUTO: 84.7 FL — SIGNIFICANT CHANGE UP (ref 80–100)
MRSA PCR RESULT.: SIGNIFICANT CHANGE UP
NRBC # BLD: 0 /100 WBCS — SIGNIFICANT CHANGE UP (ref 0–0)
PLATELET # BLD AUTO: 278 K/UL — SIGNIFICANT CHANGE UP (ref 150–400)
POTASSIUM SERPL-MCNC: 4.1 MMOL/L — SIGNIFICANT CHANGE UP (ref 3.5–5.3)
POTASSIUM SERPL-SCNC: 4.1 MMOL/L — SIGNIFICANT CHANGE UP (ref 3.5–5.3)
PROT SERPL-MCNC: 7.8 G/DL — SIGNIFICANT CHANGE UP (ref 6–8.3)
PROTHROM AB SERPL-ACNC: 12.2 SEC — SIGNIFICANT CHANGE UP (ref 10–12.9)
RBC # BLD: 5.37 M/UL — SIGNIFICANT CHANGE UP (ref 4.2–5.8)
RBC # FLD: 13.2 % — SIGNIFICANT CHANGE UP (ref 10.3–14.5)
S AUREUS DNA NOSE QL NAA+PROBE: DETECTED
SODIUM SERPL-SCNC: 138 MMOL/L — SIGNIFICANT CHANGE UP (ref 135–145)
WBC # BLD: 5.29 K/UL — SIGNIFICANT CHANGE UP (ref 3.8–10.5)
WBC # FLD AUTO: 5.29 K/UL — SIGNIFICANT CHANGE UP (ref 3.8–10.5)

## 2019-07-05 PROCEDURE — 36415 COLL VENOUS BLD VENIPUNCTURE: CPT

## 2019-07-05 PROCEDURE — 93010 ELECTROCARDIOGRAM REPORT: CPT

## 2019-07-05 PROCEDURE — 85730 THROMBOPLASTIN TIME PARTIAL: CPT

## 2019-07-05 PROCEDURE — 80053 COMPREHEN METABOLIC PANEL: CPT

## 2019-07-05 PROCEDURE — 85027 COMPLETE CBC AUTOMATED: CPT

## 2019-07-05 PROCEDURE — 86901 BLOOD TYPING SEROLOGIC RH(D): CPT

## 2019-07-05 PROCEDURE — G0463: CPT

## 2019-07-05 PROCEDURE — 86900 BLOOD TYPING SEROLOGIC ABO: CPT

## 2019-07-05 PROCEDURE — 86850 RBC ANTIBODY SCREEN: CPT

## 2019-07-05 PROCEDURE — 85610 PROTHROMBIN TIME: CPT

## 2019-07-05 PROCEDURE — 93005 ELECTROCARDIOGRAM TRACING: CPT

## 2019-07-05 PROCEDURE — 87640 STAPH A DNA AMP PROBE: CPT

## 2019-07-05 NOTE — H&P PST ADULT - NSICDXFAMILYHX_GEN_ALL_CORE_FT
FAMILY HISTORY:  Father  Still living? Yes, Estimated age: 81-90  Family history of prostate cancer, Age at diagnosis: Age Unknown    Mother  Still living? Yes, Estimated age: 81-90  Family history of diabetes mellitus, Age at diagnosis: Age Unknown

## 2019-07-05 NOTE — H&P PST ADULT - HISTORY OF PRESENT ILLNESS
this is a 54 y/o male who has pain left shoulder for about 1 year; tests show bone on bone, bone spurs; to have left total shoulder replacement; takes advil or tylenol for pain

## 2019-07-05 NOTE — H&P PST ADULT - NSICDXPASTSURGICALHX_GEN_ALL_CORE_FT
PAST SURGICAL HISTORY:  Chronic back pain greater than 3 months duration 2/26/ 2013 temporary placement of generator,  removed 2013    Chronic lower back pain     History of hemorrhoidectomy 2018    History of hernia repair ventral 2018    S/P knee surgery right  2004    S/P shoulder replacement, right 3/19

## 2019-07-05 NOTE — H&P PST ADULT - NSICDXPASTMEDICALHX_GEN_ALL_CORE_FT
PAST MEDICAL HISTORY:  BMI 33.0-33.9,adult     Chronic lower back pain     GERD (gastroesophageal reflux disease)     Herniated lumbar intervertebral disc     OA (osteoarthritis)     Obesity (BMI 30.0-34.9)     Osteoarthritis of right shoulder     Sarcoidosis     Sleep apnea CPAP    Vertigo

## 2019-07-05 NOTE — H&P PST ADULT - NSICDXPROBLEM_GEN_ALL_CORE_FT
PROBLEM DIAGNOSES  Problem: Left shoulder pain  Assessment and Plan: left total shoulder replacement      R/O PROBLEM DIAGNOSES  Problem: Obstructive sleep apnea  Assessment and Plan: pulmonary clearance needed; consulted with RT-patient will bring own CPAP machine    Problem: Abnormal EKG  Assessment and Plan: medical clearance required

## 2019-07-08 RX ORDER — MUPIROCIN 20 MG/G
1 OINTMENT TOPICAL
Qty: 1 | Refills: 0
Start: 2019-07-08 | End: 2019-07-12

## 2019-07-08 NOTE — PROGRESS NOTE ADULT - SUBJECTIVE AND OBJECTIVE BOX
Nose culture positive for MSSA. Mupirocin ointment 2% escribed and sent to patient's pharmacy and to be used twice a day for 5 consecutive days. Called patient and reviewed the treatment protocol. Questions answered and he verbalized an understanding.

## 2019-07-19 PROBLEM — R42 DIZZINESS AND GIDDINESS: Chronic | Status: ACTIVE | Noted: 2019-07-05

## 2019-07-19 PROBLEM — M19.90 UNSPECIFIED OSTEOARTHRITIS, UNSPECIFIED SITE: Chronic | Status: ACTIVE | Noted: 2019-07-05

## 2019-07-19 PROBLEM — K21.9 GASTRO-ESOPHAGEAL REFLUX DISEASE WITHOUT ESOPHAGITIS: Chronic | Status: ACTIVE | Noted: 2019-07-05

## 2019-07-23 ENCOUNTER — TRANSCRIPTION ENCOUNTER (OUTPATIENT)
Age: 54
End: 2019-07-23

## 2019-07-23 RX ORDER — SODIUM CHLORIDE 9 MG/ML
1000 INJECTION, SOLUTION INTRAVENOUS
Refills: 0 | Status: DISCONTINUED | OUTPATIENT
Start: 2019-07-24 | End: 2019-07-25

## 2019-07-23 RX ORDER — PANTOPRAZOLE SODIUM 20 MG/1
40 TABLET, DELAYED RELEASE ORAL
Refills: 0 | Status: DISCONTINUED | OUTPATIENT
Start: 2019-07-24 | End: 2019-07-25

## 2019-07-23 RX ORDER — DOCUSATE SODIUM 100 MG
100 CAPSULE ORAL THREE TIMES A DAY
Refills: 0 | Status: DISCONTINUED | OUTPATIENT
Start: 2019-07-24 | End: 2019-07-25

## 2019-07-23 RX ORDER — MAGNESIUM HYDROXIDE 400 MG/1
30 TABLET, CHEWABLE ORAL DAILY
Refills: 0 | Status: DISCONTINUED | OUTPATIENT
Start: 2019-07-24 | End: 2019-07-25

## 2019-07-23 RX ORDER — POLYETHYLENE GLYCOL 3350 17 G/17G
17 POWDER, FOR SOLUTION ORAL DAILY
Refills: 0 | Status: DISCONTINUED | OUTPATIENT
Start: 2019-07-24 | End: 2019-07-25

## 2019-07-23 RX ORDER — SENNA PLUS 8.6 MG/1
2 TABLET ORAL AT BEDTIME
Refills: 0 | Status: DISCONTINUED | OUTPATIENT
Start: 2019-07-24 | End: 2019-07-25

## 2019-07-23 RX ORDER — ONDANSETRON 8 MG/1
4 TABLET, FILM COATED ORAL EVERY 6 HOURS
Refills: 0 | Status: DISCONTINUED | OUTPATIENT
Start: 2019-07-24 | End: 2019-07-25

## 2019-07-23 NOTE — PATIENT PROFILE ADULT - NSPROPTRIGHTCAREGIVER_GEN_A_NUR
COLECTOMY    DIET:  Â· Resume your regular diet. ACTIVITY:  Â· Keep dressings dry if any are required after discharge. Â· You may shower. The surgical adhesive placed over the incisions is waterproof. No soaking in the tub or swimming for 10 days. Â· Cough and deep breathe at least 10 times every hour for 3 days. Â· It is ok to take Aspirin or Ibuprofen products if needed for pain or other prior indications. Â· Activities that you would do in the course of a normal day such as walking outside and climbing stairs are ok. No lifting greater than 20 pounds for 6 weeks. Â· No driving for 5 days after discharge. WHAT TO EXPECT:  Â· It is normal to have some pain in your abdomen as you increase activity. Â· A small amount of bloody drainage on your dressings is normal.  Â· You may have some bruising or discoloration at your incision sites. Â· Mild sore throat, mild body/muscle aches. Â· You should have at least 1 bowel movement per day. If you do not, over-the-counter stool softeners and laxatives such as colace, senna, and milk of magnesia are safe to take. NOTIFY YOUR DOCTOR IF YOU HAVE THESE SYMPTOMS:  Â· Severe pain not relieved by your pain medication. Â· Fever over 101Â°  Â· Redness, warmth, swelling and/or pus at the incision sites. Â· Excessive bleeding that soaks through your dressings. Â· Persistent nausea or vomiting. RETURNING TO WORK/SCHOOL  Â· Fatigue after your surgery is usually what limits your ability to participate in work or school activities. Most people take 2 weeks off work. You may return to work or school when you feel you are ready. Since you have had general anesthesia, it is important to keep your lungs clear and expanded, by taking deep breaths every 2 hours for 24 hours, while awake. If you smoke or have a history of lung problems, take 3 deep breaths and cough every 2 hours for 24 hours, while awake.  Also, until you are back to your normal activity level, it is important to maintain good circulation by being up and around at home, but not in excess. This is even more important if you have a history of blood clots. FOLLOW-UP:  APPOINTMENT: DR. Luciano Mary  Future Appointments   Date Time Provider [address]   2/13/2019  9:00 AM [facility]      You should be seen 2 weeks following surgery. To schedule or reschedule your appointment please call one of following locations. Â· [address] [facility], [address]:   [address] [facility]:  [phone] [facility]    If you have any problems or questions concerning your surgical procedure, please do not hesitate to call  [phone] . yes

## 2019-07-24 ENCOUNTER — RESULT REVIEW (OUTPATIENT)
Age: 54
End: 2019-07-24

## 2019-07-24 ENCOUNTER — APPOINTMENT (OUTPATIENT)
Dept: ORTHOPEDIC SURGERY | Facility: HOSPITAL | Age: 54
End: 2019-07-24

## 2019-07-24 ENCOUNTER — INPATIENT (INPATIENT)
Facility: HOSPITAL | Age: 54
LOS: 0 days | Discharge: HOME | DRG: 483 | End: 2019-07-25
Attending: ORTHOPAEDIC SURGERY | Admitting: ORTHOPAEDIC SURGERY
Payer: COMMERCIAL

## 2019-07-24 VITALS
SYSTOLIC BLOOD PRESSURE: 138 MMHG | OXYGEN SATURATION: 98 % | TEMPERATURE: 98 F | WEIGHT: 223.11 LBS | RESPIRATION RATE: 21 BRPM | HEIGHT: 65 IN | DIASTOLIC BLOOD PRESSURE: 78 MMHG | HEART RATE: 93 BPM

## 2019-07-24 DIAGNOSIS — Z98.890 OTHER SPECIFIED POSTPROCEDURAL STATES: Chronic | ICD-10-CM

## 2019-07-24 DIAGNOSIS — D86.9 SARCOIDOSIS, UNSPECIFIED: ICD-10-CM

## 2019-07-24 DIAGNOSIS — Z01.818 ENCOUNTER FOR OTHER PREPROCEDURAL EXAMINATION: ICD-10-CM

## 2019-07-24 DIAGNOSIS — Z96.611 PRESENCE OF RIGHT ARTIFICIAL SHOULDER JOINT: Chronic | ICD-10-CM

## 2019-07-24 DIAGNOSIS — M19.012 PRIMARY OSTEOARTHRITIS, LEFT SHOULDER: ICD-10-CM

## 2019-07-24 DIAGNOSIS — M54.5 LOW BACK PAIN: Chronic | ICD-10-CM

## 2019-07-24 DIAGNOSIS — E66.9 OBESITY, UNSPECIFIED: ICD-10-CM

## 2019-07-24 DIAGNOSIS — G47.30 SLEEP APNEA, UNSPECIFIED: ICD-10-CM

## 2019-07-24 LAB
GLUCOSE BLDC GLUCOMTR-MCNC: 147 MG/DL — HIGH (ref 70–99)
HCT VFR BLD CALC: 42.2 % — SIGNIFICANT CHANGE UP (ref 39–50)
HGB BLD-MCNC: 13.9 G/DL — SIGNIFICANT CHANGE UP (ref 13–17)

## 2019-07-24 PROCEDURE — 88304 TISSUE EXAM BY PATHOLOGIST: CPT | Mod: 26

## 2019-07-24 PROCEDURE — 88311 DECALCIFY TISSUE: CPT | Mod: 26

## 2019-07-24 PROCEDURE — 23472 RECONSTRUCT SHOULDER JOINT: CPT | Mod: LT

## 2019-07-24 PROCEDURE — 73030 X-RAY EXAM OF SHOULDER: CPT | Mod: 26,LT

## 2019-07-24 RX ORDER — ACETAMINOPHEN 500 MG
1000 TABLET ORAL EVERY 8 HOURS
Refills: 0 | Status: DISCONTINUED | OUTPATIENT
Start: 2019-07-25 | End: 2019-07-25

## 2019-07-24 RX ORDER — APREPITANT 80 MG/1
40 CAPSULE ORAL ONCE
Refills: 0 | Status: COMPLETED | OUTPATIENT
Start: 2019-07-24 | End: 2019-07-24

## 2019-07-24 RX ORDER — HYDROMORPHONE HYDROCHLORIDE 2 MG/ML
0.5 INJECTION INTRAMUSCULAR; INTRAVENOUS; SUBCUTANEOUS ONCE
Refills: 0 | Status: DISCONTINUED | OUTPATIENT
Start: 2019-07-24 | End: 2019-07-24

## 2019-07-24 RX ORDER — OXYCODONE HYDROCHLORIDE 5 MG/1
5 TABLET ORAL
Refills: 0 | Status: DISCONTINUED | OUTPATIENT
Start: 2019-07-24 | End: 2019-07-25

## 2019-07-24 RX ORDER — HYDROMORPHONE HYDROCHLORIDE 2 MG/ML
0.5 INJECTION INTRAMUSCULAR; INTRAVENOUS; SUBCUTANEOUS
Refills: 0 | Status: DISCONTINUED | OUTPATIENT
Start: 2019-07-24 | End: 2019-07-24

## 2019-07-24 RX ORDER — OXYCODONE HYDROCHLORIDE 5 MG/1
10 TABLET ORAL
Refills: 0 | Status: DISCONTINUED | OUTPATIENT
Start: 2019-07-24 | End: 2019-07-25

## 2019-07-24 RX ORDER — ONDANSETRON 8 MG/1
4 TABLET, FILM COATED ORAL ONCE
Refills: 0 | Status: DISCONTINUED | OUTPATIENT
Start: 2019-07-24 | End: 2019-07-24

## 2019-07-24 RX ORDER — TRANEXAMIC ACID 100 MG/ML
1000 INJECTION, SOLUTION INTRAVENOUS ONCE
Refills: 0 | Status: DISCONTINUED | OUTPATIENT
Start: 2019-07-24 | End: 2019-07-24

## 2019-07-24 RX ORDER — SODIUM CHLORIDE 9 MG/ML
1000 INJECTION, SOLUTION INTRAVENOUS
Refills: 0 | Status: DISCONTINUED | OUTPATIENT
Start: 2019-07-24 | End: 2019-07-24

## 2019-07-24 RX ORDER — CELECOXIB 200 MG/1
200 CAPSULE ORAL EVERY 12 HOURS
Refills: 0 | Status: DISCONTINUED | OUTPATIENT
Start: 2019-07-24 | End: 2019-07-25

## 2019-07-24 RX ORDER — CEFAZOLIN SODIUM 1 G
2000 VIAL (EA) INJECTION EVERY 8 HOURS
Refills: 0 | Status: COMPLETED | OUTPATIENT
Start: 2019-07-24 | End: 2019-07-24

## 2019-07-24 RX ORDER — CEFAZOLIN SODIUM 1 G
2000 VIAL (EA) INJECTION ONCE
Refills: 0 | Status: DISCONTINUED | OUTPATIENT
Start: 2019-07-24 | End: 2019-07-24

## 2019-07-24 RX ORDER — HYDROMORPHONE HYDROCHLORIDE 2 MG/ML
0.5 INJECTION INTRAMUSCULAR; INTRAVENOUS; SUBCUTANEOUS
Refills: 0 | Status: DISCONTINUED | OUTPATIENT
Start: 2019-07-24 | End: 2019-07-25

## 2019-07-24 RX ORDER — ACETAMINOPHEN 500 MG
1000 TABLET ORAL EVERY 6 HOURS
Refills: 0 | Status: COMPLETED | OUTPATIENT
Start: 2019-07-24 | End: 2019-07-25

## 2019-07-24 RX ORDER — CHLORHEXIDINE GLUCONATE 213 G/1000ML
1 SOLUTION TOPICAL ONCE
Refills: 0 | Status: COMPLETED | OUTPATIENT
Start: 2019-07-24 | End: 2019-07-24

## 2019-07-24 RX ORDER — ASPIRIN/CALCIUM CARB/MAGNESIUM 324 MG
81 TABLET ORAL EVERY 12 HOURS
Refills: 0 | Status: DISCONTINUED | OUTPATIENT
Start: 2019-07-25 | End: 2019-07-25

## 2019-07-24 RX ORDER — ACETAMINOPHEN 500 MG
1000 TABLET ORAL ONCE
Refills: 0 | Status: DISCONTINUED | OUTPATIENT
Start: 2019-07-24 | End: 2019-07-24

## 2019-07-24 RX ADMIN — Medication 400 MILLIGRAM(S): at 14:25

## 2019-07-24 RX ADMIN — HYDROMORPHONE HYDROCHLORIDE 0.5 MILLIGRAM(S): 2 INJECTION INTRAMUSCULAR; INTRAVENOUS; SUBCUTANEOUS at 12:30

## 2019-07-24 RX ADMIN — SODIUM CHLORIDE 50 MILLILITER(S): 9 INJECTION, SOLUTION INTRAVENOUS at 14:26

## 2019-07-24 RX ADMIN — Medication 100 MILLIGRAM(S): at 23:12

## 2019-07-24 RX ADMIN — CELECOXIB 200 MILLIGRAM(S): 200 CAPSULE ORAL at 20:49

## 2019-07-24 RX ADMIN — HYDROMORPHONE HYDROCHLORIDE 0.5 MILLIGRAM(S): 2 INJECTION INTRAMUSCULAR; INTRAVENOUS; SUBCUTANEOUS at 23:30

## 2019-07-24 RX ADMIN — HYDROMORPHONE HYDROCHLORIDE 0.5 MILLIGRAM(S): 2 INJECTION INTRAMUSCULAR; INTRAVENOUS; SUBCUTANEOUS at 11:35

## 2019-07-24 RX ADMIN — SODIUM CHLORIDE 100 MILLILITER(S): 9 INJECTION, SOLUTION INTRAVENOUS at 16:04

## 2019-07-24 RX ADMIN — Medication 100 MILLIGRAM(S): at 20:49

## 2019-07-24 RX ADMIN — Medication 400 MILLIGRAM(S): at 20:48

## 2019-07-24 RX ADMIN — HYDROMORPHONE HYDROCHLORIDE 0.5 MILLIGRAM(S): 2 INJECTION INTRAMUSCULAR; INTRAVENOUS; SUBCUTANEOUS at 16:30

## 2019-07-24 RX ADMIN — Medication 1000 MILLIGRAM(S): at 14:50

## 2019-07-24 RX ADMIN — SODIUM CHLORIDE 100 MILLILITER(S): 9 INJECTION, SOLUTION INTRAVENOUS at 23:12

## 2019-07-24 RX ADMIN — HYDROMORPHONE HYDROCHLORIDE 0.5 MILLIGRAM(S): 2 INJECTION INTRAMUSCULAR; INTRAVENOUS; SUBCUTANEOUS at 11:50

## 2019-07-24 RX ADMIN — CELECOXIB 200 MILLIGRAM(S): 200 CAPSULE ORAL at 20:48

## 2019-07-24 RX ADMIN — HYDROMORPHONE HYDROCHLORIDE 0.5 MILLIGRAM(S): 2 INJECTION INTRAMUSCULAR; INTRAVENOUS; SUBCUTANEOUS at 19:10

## 2019-07-24 RX ADMIN — HYDROMORPHONE HYDROCHLORIDE 0.5 MILLIGRAM(S): 2 INJECTION INTRAMUSCULAR; INTRAVENOUS; SUBCUTANEOUS at 16:04

## 2019-07-24 RX ADMIN — Medication 1000 MILLIGRAM(S): at 20:49

## 2019-07-24 RX ADMIN — HYDROMORPHONE HYDROCHLORIDE 0.5 MILLIGRAM(S): 2 INJECTION INTRAMUSCULAR; INTRAVENOUS; SUBCUTANEOUS at 12:45

## 2019-07-24 RX ADMIN — HYDROMORPHONE HYDROCHLORIDE 0.5 MILLIGRAM(S): 2 INJECTION INTRAMUSCULAR; INTRAVENOUS; SUBCUTANEOUS at 19:30

## 2019-07-24 RX ADMIN — APREPITANT 40 MILLIGRAM(S): 80 CAPSULE ORAL at 07:24

## 2019-07-24 RX ADMIN — CHLORHEXIDINE GLUCONATE 1 APPLICATION(S): 213 SOLUTION TOPICAL at 07:24

## 2019-07-24 RX ADMIN — Medication 100 MILLIGRAM(S): at 16:07

## 2019-07-24 RX ADMIN — HYDROMORPHONE HYDROCHLORIDE 0.5 MILLIGRAM(S): 2 INJECTION INTRAMUSCULAR; INTRAVENOUS; SUBCUTANEOUS at 14:30

## 2019-07-24 RX ADMIN — HYDROMORPHONE HYDROCHLORIDE 0.5 MILLIGRAM(S): 2 INJECTION INTRAMUSCULAR; INTRAVENOUS; SUBCUTANEOUS at 14:41

## 2019-07-24 NOTE — PHYSICAL THERAPY INITIAL EVALUATION ADULT - RANGE OF MOTION EXAMINATION, REHAB EVAL
bilateral lower extremity ROM was WFL (within functional limits)/deficits as listed below/left UE/Right UE ROM was WFL (within functional limits)

## 2019-07-24 NOTE — PHYSICAL THERAPY INITIAL EVALUATION ADULT - ADDITIONAL COMMENTS
Pt lives in a 2 story home with wife with 1 step to enter without a rail and 12 steps to the second floor with a hand rail.

## 2019-07-24 NOTE — PHYSICAL THERAPY INITIAL EVALUATION ADULT - TRANSFER TRAINING, PT EVAL
Pt will be independent with transfers in 2-4 days. Pt will be independent with transfers in 1-2 days.

## 2019-07-24 NOTE — PHYSICAL THERAPY INITIAL EVALUATION ADULT - GENERAL OBSERVATIONS, REHAB EVAL
Pt received semisupine in bed +ice, +tele, +SCDs, +shoulder sling Pt received semisupine in bed +ice, +tele, +SCDs, +shoulder sling, +IV

## 2019-07-24 NOTE — PHYSICAL THERAPY INITIAL EVALUATION ADULT - BED MOBILITY TRAINING, PT EVAL
Pt will be independent with bed mobility in 2-4 days. Pt will be independent with bed mobility in 1-2 days.

## 2019-07-24 NOTE — CONSULT NOTE ADULT - SUBJECTIVE AND OBJECTIVE BOX
PULMONARY/CRITICAL CARE        Patient is a 54y old  Male who presents with a chief complaint of Left Total Shoulder Replacement (24 Jul 2019 15:39)    BRIEF HOSPITAL COURSE: ***    Events last 24 hours: ***    PAST MEDICAL & SURGICAL HISTORY:  Vertigo  GERD (gastroesophageal reflux disease)  OA (osteoarthritis)  Chronic lower back pain  Obesity (BMI 30.0-34.9)  BMI 33.0-33.9,adult  Herniated lumbar intervertebral disc  Sarcoidosis  Osteoarthritis of right shoulder  Sleep apnea: CPAP nitely   S/P shoulder replacement, right: 3/19  Chronic lower back pain  History of hernia repair: ventral 2018  History of hemorrhoidectomy: 2018  Chronic back pain greater than 3 months duration: 2/26/ 2013 temporary placement of generator,  removed 2013  S/P knee surgery: right  2004    Allergies    No Known Allergies    Intolerances      FAMILY HISTORY/ Social: smoked off and on for 20 yrs. Vapes now. Denies etoh.   Family history of diabetes mellitus (Mother)  Family history of prostate cancer (Father)      Review of Systems:  CONSTITUTIONAL: No fever, chills, or fatigue  EYES: No eye pain, visual disturbances, or discharge  ENMT:  No difficulty hearing, tinnitus, vertigo; No sinus or throat pain  NECK: No pain or stiffness  RESPIRATORY: No cough, wheezing, chills or hemoptysis; No shortness of breath  CARDIOVASCULAR: No chest pain, palpitations, dizziness, or leg swelling  GASTROINTESTINAL: No abdominal or epigastric pain. No nausea, vomiting, or hematemesis; No diarrhea or constipation. No melena or hematochezia.  GENITOURINARY: No dysuria, frequency, hematuria, or incontinence  NEUROLOGICAL: No headaches, memory loss, loss of strength, numbness, or tremors  SKIN: No itching, burning, rashes, or lesions   MUSCULOSKELETAL: left shoulder  joint pain   PSYCHIATRIC: No depression, anxiety, mood swings, or difficulty sleeping      Medications:  ceFAZolin   IVPB 2000 milliGRAM(s) IV Intermittent every 8 hours        acetaminophen  IVPB .. 1000 milliGRAM(s) IV Intermittent every 6 hours  celecoxib 200 milliGRAM(s) Oral every 12 hours  HYDROmorphone  Injectable 0.5 milliGRAM(s) IV Push every 3 hours PRN  ondansetron Injectable 4 milliGRAM(s) IV Push every 6 hours PRN  oxyCODONE    IR 5 milliGRAM(s) Oral every 3 hours PRN  oxyCODONE    IR 10 milliGRAM(s) Oral every 3 hours PRN      aspirin enteric coated 81 milliGRAM(s) Oral every 12 hours    aluminum hydroxide/magnesium hydroxide/simethicone Suspension 30 milliLiter(s) Oral four times a day PRN  docusate sodium 100 milliGRAM(s) Oral three times a day  magnesium hydroxide Suspension 30 milliLiter(s) Oral daily PRN  pantoprazole    Tablet 40 milliGRAM(s) Oral before breakfast  polyethylene glycol 3350 17 Gram(s) Oral daily PRN  senna 2 Tablet(s) Oral at bedtime PRN        lactated ringers. 1000 milliLiter(s) IV Continuous <Continuous>                ICU Vital Signs Last 24 Hrs  T(C): 36.6 (24 Jul 2019 15:26), Max: 37.7 (24 Jul 2019 11:20)  T(F): 97.8 (24 Jul 2019 15:26), Max: 99.9 (24 Jul 2019 11:20)  HR: 88 (24 Jul 2019 15:26) (88 - 106)  BP: 138/96 (24 Jul 2019 15:26) (132/87 - 149/98)  BP(mean): --  ABP: --  ABP(mean): --  RR: 18 (24 Jul 2019 15:26) (18 - 31)  SpO2: 95% (24 Jul 2019 15:26) (93% - 98%)    Vital Signs Last 24 Hrs  T(C): 36.6 (24 Jul 2019 15:26), Max: 37.7 (24 Jul 2019 11:20)  T(F): 97.8 (24 Jul 2019 15:26), Max: 99.9 (24 Jul 2019 11:20)  HR: 88 (24 Jul 2019 15:26) (88 - 106)  BP: 138/96 (24 Jul 2019 15:26) (132/87 - 149/98)  BP(mean): --  RR: 18 (24 Jul 2019 15:26) (18 - 31)  SpO2: 95% (24 Jul 2019 15:26) (93% - 98%)        I&O's Detail    24 Jul 2019 07:01  -  24 Jul 2019 18:16  --------------------------------------------------------  IN:    lactated ringers.: 1775 mL  Total IN: 1775 mL    OUT:    Estimated Blood Loss: 200 mL  Total OUT: 200 mL    Total NET: 1575 mL            LABS:                        13.9   x     )-----------( x        ( 24 Jul 2019 17:17 )             42.2                 CAPILLARY BLOOD GLUCOSE            CULTURES:      Physical Examination:    General: No acute distress.   overweight male    HEENT: Pupils equal, reactive to light.  Symmetric.    PULM: Clear to auscultation bilaterally, no significant sputum production    CVS: Regular rate and rhythm, no murmurs, rubs, or gallops    ABD: Soft, nondistended, nontender, normoactive bowel sounds, no masses    EXT: No edema, nontender left shoulder bandaged    SKIN: Warm and well perfused, no rashes noted.    NEURO: Alert, oriented, interactive, nonfocal    RADIOLOGY: ***    CRITICAL CARE TIME SPENT: ***

## 2019-07-24 NOTE — PHYSICAL THERAPY INITIAL EVALUATION ADULT - GAIT TRAINING, PT EVAL
Pt will ambulate 200 feet independently in 2-4 days. Pt will negotiate 12 steps independently in 2-4 days. Pt will ambulate 200 feet independently in 1-2 days. Pt will negotiate 12 steps independently with rail in 1-2 days.

## 2019-07-24 NOTE — PHYSICAL THERAPY INITIAL EVALUATION ADULT - MD ORDER
PT eval  Sling for comfort and ambulating only.   Remove sling to exercise elbow and shoulder.  Full AAROM of shoulder with ER to 30 degrees; IR as tolerated.  May do Codman's and pendulums.

## 2019-07-25 ENCOUNTER — TRANSCRIPTION ENCOUNTER (OUTPATIENT)
Age: 54
End: 2019-07-25

## 2019-07-25 VITALS
DIASTOLIC BLOOD PRESSURE: 78 MMHG | HEART RATE: 87 BPM | RESPIRATION RATE: 17 BRPM | TEMPERATURE: 98 F | OXYGEN SATURATION: 95 % | SYSTOLIC BLOOD PRESSURE: 127 MMHG

## 2019-07-25 LAB
ANION GAP SERPL CALC-SCNC: 7 MMOL/L — SIGNIFICANT CHANGE UP (ref 5–17)
BUN SERPL-MCNC: 14 MG/DL — SIGNIFICANT CHANGE UP (ref 7–23)
CALCIUM SERPL-MCNC: 8.7 MG/DL — SIGNIFICANT CHANGE UP (ref 8.4–10.5)
CHLORIDE SERPL-SCNC: 102 MMOL/L — SIGNIFICANT CHANGE UP (ref 96–108)
CO2 SERPL-SCNC: 26 MMOL/L — SIGNIFICANT CHANGE UP (ref 22–31)
CREAT SERPL-MCNC: 0.93 MG/DL — SIGNIFICANT CHANGE UP (ref 0.5–1.3)
GLUCOSE SERPL-MCNC: 140 MG/DL — HIGH (ref 70–99)
HCT VFR BLD CALC: 38.3 % — LOW (ref 39–50)
HGB BLD-MCNC: 12.4 G/DL — LOW (ref 13–17)
MCHC RBC-ENTMCNC: 27.9 PG — SIGNIFICANT CHANGE UP (ref 27–34)
MCHC RBC-ENTMCNC: 32.4 GM/DL — SIGNIFICANT CHANGE UP (ref 32–36)
MCV RBC AUTO: 86.3 FL — SIGNIFICANT CHANGE UP (ref 80–100)
NRBC # BLD: 0 /100 WBCS — SIGNIFICANT CHANGE UP (ref 0–0)
PLATELET # BLD AUTO: 295 K/UL — SIGNIFICANT CHANGE UP (ref 150–400)
POTASSIUM SERPL-MCNC: 4.4 MMOL/L — SIGNIFICANT CHANGE UP (ref 3.5–5.3)
POTASSIUM SERPL-SCNC: 4.4 MMOL/L — SIGNIFICANT CHANGE UP (ref 3.5–5.3)
RBC # BLD: 4.44 M/UL — SIGNIFICANT CHANGE UP (ref 4.2–5.8)
RBC # FLD: 13.5 % — SIGNIFICANT CHANGE UP (ref 10.3–14.5)
SODIUM SERPL-SCNC: 135 MMOL/L — SIGNIFICANT CHANGE UP (ref 135–145)
WBC # BLD: 9.04 K/UL — SIGNIFICANT CHANGE UP (ref 3.8–10.5)
WBC # FLD AUTO: 9.04 K/UL — SIGNIFICANT CHANGE UP (ref 3.8–10.5)

## 2019-07-25 PROCEDURE — 97530 THERAPEUTIC ACTIVITIES: CPT

## 2019-07-25 PROCEDURE — 80048 BASIC METABOLIC PNL TOTAL CA: CPT

## 2019-07-25 PROCEDURE — 85027 COMPLETE CBC AUTOMATED: CPT

## 2019-07-25 PROCEDURE — 88304 TISSUE EXAM BY PATHOLOGIST: CPT

## 2019-07-25 PROCEDURE — 85014 HEMATOCRIT: CPT

## 2019-07-25 PROCEDURE — 97161 PT EVAL LOW COMPLEX 20 MIN: CPT

## 2019-07-25 PROCEDURE — 85018 HEMOGLOBIN: CPT

## 2019-07-25 PROCEDURE — 73030 X-RAY EXAM OF SHOULDER: CPT

## 2019-07-25 PROCEDURE — C1776: CPT

## 2019-07-25 PROCEDURE — 97165 OT EVAL LOW COMPLEX 30 MIN: CPT

## 2019-07-25 PROCEDURE — 88311 DECALCIFY TISSUE: CPT

## 2019-07-25 PROCEDURE — 82962 GLUCOSE BLOOD TEST: CPT

## 2019-07-25 PROCEDURE — 36415 COLL VENOUS BLD VENIPUNCTURE: CPT

## 2019-07-25 PROCEDURE — 97116 GAIT TRAINING THERAPY: CPT

## 2019-07-25 PROCEDURE — C1713: CPT

## 2019-07-25 RX ORDER — SENNA PLUS 8.6 MG/1
2 TABLET ORAL
Qty: 0 | Refills: 0 | DISCHARGE
Start: 2019-07-25

## 2019-07-25 RX ORDER — OXYCODONE HYDROCHLORIDE 5 MG/1
1 TABLET ORAL
Qty: 56 | Refills: 0
Start: 2019-07-25 | End: 2019-07-31

## 2019-07-25 RX ORDER — CELECOXIB 200 MG/1
1 CAPSULE ORAL
Qty: 60 | Refills: 0
Start: 2019-07-25 | End: 2019-08-23

## 2019-07-25 RX ORDER — ASPIRIN/CALCIUM CARB/MAGNESIUM 324 MG
1 TABLET ORAL
Qty: 80 | Refills: 0
Start: 2019-07-25 | End: 2019-09-02

## 2019-07-25 RX ORDER — DOCUSATE SODIUM 100 MG
1 CAPSULE ORAL
Qty: 0 | Refills: 0 | DISCHARGE
Start: 2019-07-25

## 2019-07-25 RX ADMIN — Medication 1000 MILLIGRAM(S): at 10:08

## 2019-07-25 RX ADMIN — Medication 100 MILLIGRAM(S): at 11:03

## 2019-07-25 RX ADMIN — OXYCODONE HYDROCHLORIDE 10 MILLIGRAM(S): 5 TABLET ORAL at 06:35

## 2019-07-25 RX ADMIN — OXYCODONE HYDROCHLORIDE 10 MILLIGRAM(S): 5 TABLET ORAL at 07:15

## 2019-07-25 RX ADMIN — Medication 1000 MILLIGRAM(S): at 02:00

## 2019-07-25 RX ADMIN — HYDROMORPHONE HYDROCHLORIDE 0.5 MILLIGRAM(S): 2 INJECTION INTRAMUSCULAR; INTRAVENOUS; SUBCUTANEOUS at 04:00

## 2019-07-25 RX ADMIN — CELECOXIB 200 MILLIGRAM(S): 200 CAPSULE ORAL at 10:06

## 2019-07-25 RX ADMIN — OXYCODONE HYDROCHLORIDE 10 MILLIGRAM(S): 5 TABLET ORAL at 10:05

## 2019-07-25 RX ADMIN — Medication 1000 MILLIGRAM(S): at 10:06

## 2019-07-25 RX ADMIN — HYDROMORPHONE HYDROCHLORIDE 0.5 MILLIGRAM(S): 2 INJECTION INTRAMUSCULAR; INTRAVENOUS; SUBCUTANEOUS at 00:00

## 2019-07-25 RX ADMIN — OXYCODONE HYDROCHLORIDE 10 MILLIGRAM(S): 5 TABLET ORAL at 10:45

## 2019-07-25 RX ADMIN — Medication 81 MILLIGRAM(S): at 05:57

## 2019-07-25 RX ADMIN — Medication 100 MILLIGRAM(S): at 05:57

## 2019-07-25 RX ADMIN — HYDROMORPHONE HYDROCHLORIDE 0.5 MILLIGRAM(S): 2 INJECTION INTRAMUSCULAR; INTRAVENOUS; SUBCUTANEOUS at 03:39

## 2019-07-25 RX ADMIN — CELECOXIB 200 MILLIGRAM(S): 200 CAPSULE ORAL at 10:08

## 2019-07-25 RX ADMIN — PANTOPRAZOLE SODIUM 40 MILLIGRAM(S): 20 TABLET, DELAYED RELEASE ORAL at 05:57

## 2019-07-25 RX ADMIN — Medication 400 MILLIGRAM(S): at 02:00

## 2019-07-25 NOTE — PROGRESS NOTE ADULT - ASSESSMENT
Pt stable postop left TSR.  Advised no vaping.  Advised followup with pulmonologist.  Hx DONG on cpap.

## 2019-07-25 NOTE — DISCHARGE NOTE PROVIDER - CARE PROVIDER_API CALL
Alec Castaneda)  Orthopaedic Sports Medicine; Orthopaedic Surgery  [address] [address]  Phone: [phone]  Fax: [phone]  Follow Up Time: 2 weeks

## 2019-07-25 NOTE — DISCHARGE NOTE NURSING/CASE MANAGEMENT/SOCIAL WORK - NSDCDPATPORTLINK_GEN_ALL_CORE
You can access the [facility] Patient Portal, offered by [facility], by registering with the following website: [email or web address]

## 2019-07-25 NOTE — DISCHARGE NOTE PROVIDER - HOSPITAL COURSE
This patient was admitted to [facility] with a history of severe degenerative joint disease of the left shoulder.  Patient went to Pre-Surgical Testing at [facility] and was medically cleared to undergo elective procedure.  No operative or garth-operative complications arose during patients hospital course.  Patient received antibiotic according to SCIP guidelines for infection prevention.  Ecotrin was given for DVT prophylaxis.  Anesthesia, Medical Hospitalist, Physical Therapy and Occupational Therapy were consulted. Patient is stable for discharge with a good prognosis.  Appropriate discharge instructions and medications are provided in this document.

## 2019-07-25 NOTE — DISCHARGE NOTE PROVIDER - NSDCCPCAREPLAN_GEN_ALL_CORE_FT
PRINCIPAL DISCHARGE DIAGNOSIS  Diagnosis: Osteoarthritis of left shoulder  Assessment and Plan of Treatment: Non weight bearing  no external rotation past 30 degrees  Ok to perform pendulums, codmans   Full  Active Assisted Range of motion   sling for comfort

## 2019-07-25 NOTE — OCCUPATIONAL THERAPY INITIAL EVALUATION ADULT - PRECAUTIONS/LIMITATIONS, REHAB EVAL
fall precautions/surgical precautions/sling for comfort and ambulation, Full AAROM shld, ER to 30, IR as tolerated may do codman's and pendulums

## 2019-07-25 NOTE — OCCUPATIONAL THERAPY INITIAL EVALUATION ADULT - ADDITIONAL COMMENTS
Pt lives with spouse in a private house 1 [address] no railing 12 steps inside +railing +stall +grab bars  Pt/spouse reports wife will assist with ADL's as needed

## 2019-07-25 NOTE — DISCHARGE NOTE PROVIDER - INSTRUCTIONS
For Constipation :   • Increase your water intake. Drink at least 8 glasses of water daily.  • Try adding fiber to your diet by eating fruits, vegetables and foods that are rich in grains.  • If you do experience constipation, you may take an over-the-counter stool softener/laxative such as Maribell Colace, Senekot or  Milk of Magnesia.

## 2019-07-25 NOTE — DISCHARGE NOTE NURSING/CASE MANAGEMENT/SOCIAL WORK - NSSCCONTNUM_GEN_ALL_CORE
Please contact the home care agency at  [phone] or [phone] if you have not heard from them by 12 noon on the day after your hospital discharge.

## 2019-07-25 NOTE — PROGRESS NOTE ADULT - SUBJECTIVE AND OBJECTIVE BOX
NAHID CORNELIO      54y Male                                                                                                                               POD # 1       STATUS POST:               Pre-Op Dx: Primary osteoarthritis, left shoulder    Post-Op Dx:  Osteoarthritis of left shoulder    Procedure: Left total shoulder arthroplasty                                                  T(F): 97.7  HR: 81  BP: 132/81  RR: 17  SpO2: 95%                        12.4   9.04  )-----------( 295      ( 25 Jul 2019 07:27 )             38.3                             Physical Exam :    -   Dressing C/D/I.   -   Distal Neurvascular status intact grossly.   -   Warm well perfused; capillary refill <3 seconds   -   (+)wrist flexion/extension 5/5  -   (+) Sensation to light touch      A/P: 54y Male s/p Left TSR    -   Ortho Stable  -   Pain control   -   Medicine to follow  -   DVT ppx:     [x ]SCDs     [ ] ASA     [ ] Eliquis     [ ] Lovenox  -   Weight bearing status:  WBAT [x ]        PWB    [ ]     TTWB  [ ]      NWB  [ ]   -  Dispo:     Home [x ]     Acute Rehab [ ]     JACOB [ ]     TBD [ ]
POST OPERATIVE NOTE    s/p  Left Total Shoulder Replacement    Complains of   6 /10 pain at surgical site.   No headache, chest pain, shortness of breath or nausea.    Vital Signs Last 24 Hrs  T(C): 36.6 (24 Jul 2019 15:26), Max: 37.7 (24 Jul 2019 11:20)  T(F): 97.8 (24 Jul 2019 15:26), Max: 99.9 (24 Jul 2019 11:20)  HR: 88 (24 Jul 2019 15:26) (88 - 106)  BP: 138/96 (24 Jul 2019 15:26) (132/87 - 149/98)  BP(mean): --  RR: 18 (24 Jul 2019 15:26) (18 - 31)  SpO2: 95% (24 Jul 2019 15:26) (93% - 98%)    Labs:  to be drawn              Physical: Surgical site dressing clean and dry.                   Left hand sensate to light touch, +finger flex/extension, wrist flex/extension, + radial pulse, prompt capillary refill                   Calves soft, nontender         Allergies    No Known Allergies    Intolerances        Orthopedically stable    Perioperative antibiotic ceFAZolin   IVPB 2000 milliGRAM(s) IV Intermittent every 8 hours      VTE prophylaxis aspirin enteric coated 81 milliGRAM(s) Oral every 12 hours      Physical and Occupational therapy- Sling for comfort and ambulating only. Remove sling to exercise elbow and shoulder.  Full AAROM of shoulder with ER to 30 degrees; IR as tolerated.  May do Codman's and pendulums.     Pain management - multimodal regimen including acetaminophen, celebrex , narcotic. Adjust prn.     Medical care per hospitalist service     Discharge plan to be determined
PULMONARY/CRITICAL CARE      INTERVAL HPI/OVERNIGHT EVENTS:    54y MaleHPI:  Feels well. Wore cpap hs. Mild pain left shoulder.      PAST MEDICAL & SURGICAL HISTORY:  Vertigo  GERD (gastroesophageal reflux disease)  OA (osteoarthritis)  Chronic lower back pain  Obesity (BMI 30.0-34.9)  BMI 33.0-33.9,adult  Herniated lumbar intervertebral disc  Sarcoidosis  Osteoarthritis of right shoulder  Sleep apnea: CPAP  S/P shoulder replacement, right: 3/19  Chronic lower back pain  History of hernia repair: ventral 2018  History of hemorrhoidectomy: 2018  Chronic back pain greater than 3 months duration: 2/26/ 2013 temporary placement of generator,  removed 2013  S/P knee surgery: right  2004        ICU Vital Signs Last 24 Hrs  T(C): 36.5 (25 Jul 2019 03:40), Max: 37.7 (24 Jul 2019 11:20)  T(F): 97.7 (25 Jul 2019 03:40), Max: 99.9 (24 Jul 2019 11:20)  HR: 73 (25 Jul 2019 03:40) (73 - 106)  BP: 127/85 (25 Jul 2019 03:40) (119/75 - 149/98)  BP(mean): --  ABP: --  ABP(mean): --  RR: 18 (25 Jul 2019 03:40) (18 - 31)  SpO2: 94% (25 Jul 2019 03:40) (93% - 98%)    Qtts:     I&O's Summary    24 Jul 2019 07:01  -  25 Jul 2019 07:00  --------------------------------------------------------  IN: 3675 mL / OUT: 1600 mL / NET: 2075 mL              PHYSICAL EXAM:    GENERAL: NAD, well-groomed, well-developed, NAD  HEAD:  Atraumatic, Normocephalic  EYES: EOMI, PERRLA, conjunctiva and sclera clear  ENMT: No tonsillar erythema, exudates, or enlargement; Moist mucous membranes, Good dentition, No lesions  NECK: Supple, No JVD, Normal thyroid  NERVOUS SYSTEM:  Alert & Oriented X3, Good concentration; Motor Strength 5/5 B/L upper and lower extremities  CHEST/LUNG: Clear to percussion bilaterally; No rales, rhonchi, wheezing, or rubs  HEART: Regular rate and rhythm; No murmurs, rubs, or gallops  ABDOMEN: Soft, Nontender, Nondistended; Bowel sounds present  EXTREMITIES:  2+ Peripheral Pulses, No clubbing, cyanosis, or edema  Left shoulder bandaged.  No calf tenderness  LYMPH: No lymphadenopathy noted  SKIN: No rashes or lesions        LABS:                        13.9   x     )-----------( x        ( 24 Jul 2019 17:17 )             42.2                 vanco through     RADIOLOGY & ADDITIONAL STUDIES:      CRITICAL CARE TIME SPENT:

## 2019-07-26 LAB — SURGICAL PATHOLOGY STUDY: SIGNIFICANT CHANGE UP

## 2019-07-31 ENCOUNTER — RX RENEWAL (OUTPATIENT)
Age: 54
End: 2019-07-31

## 2019-08-02 ENCOUNTER — RX RENEWAL (OUTPATIENT)
Age: 54
End: 2019-08-02

## 2019-08-06 ENCOUNTER — APPOINTMENT (OUTPATIENT)
Dept: ORTHOPEDIC SURGERY | Facility: CLINIC | Age: 54
End: 2019-08-06
Payer: COMMERCIAL

## 2019-08-06 VITALS — WEIGHT: 208 LBS | HEIGHT: 65 IN | BODY MASS INDEX: 34.66 KG/M2

## 2019-08-06 PROCEDURE — 99024 POSTOP FOLLOW-UP VISIT: CPT

## 2019-08-06 PROCEDURE — 73030 X-RAY EXAM OF SHOULDER: CPT | Mod: LT

## 2019-08-06 NOTE — PROCEDURE
[de-identified] : Left Upper Extremity\par o Shoulder :\par ¦ Inspection/Palpation : no tenderness, no swelling, no deformities, surgical incision well healing with Prineo in place\par ¦ Range of Motion : PASSIVE FORWARD ELEVATION: Measured at 105 degrees, ACTIVE EXTERNAL ROTATION: Measured at 15 degrees, ACTIVE INTERNAL ROTATION: Measured at PSIS\par ¦ Strength : external rotation 5/5, internal rotation 5/5, supraspinatus 5/5\par ¦ Stability : no joint instability on provocative testing\par o Upper Arm : no tenderness, no swelling, no deformities\par o Muscle Bulk : no atrophy\par o Sensation : sensation intact to light touch\par o Skin : no skin rash or discoloration\par o Vascular Exam : no edema, no cyanosis, radial and ulnar pulses normal \par \par A prescription for Physical Therapy was provided.

## 2019-08-06 NOTE — END OF VISIT
[FreeTextEntry3] : All medical record entries made by the Edwinibyanna were at my, Dr. Alec Castaneda, direction and personally dictated by me on 08/06/2019. I have reviewed the chart and agree that the record accurately reflects my personal performance of the history, physical exam, assessment and plan. I have also personally directed, reviewed, and agreed with the chart.

## 2019-08-06 NOTE — ADDENDUM
[FreeTextEntry1] : I, Nelia Castle, acted solely as a scribe for Dr. Alec Castaneda on this date 08/06/2019.

## 2019-08-06 NOTE — HISTORY OF PRESENT ILLNESS
[___ Days Post Op] : post op day #[unfilled] [de-identified] : s/p Left total shoulder arthroplasty on 7/24/2018 [de-identified] : 54 year old male presents for an evaluation of left shoulder pain s/p Left total shoulder arthroplasty. He presents to the office wearing a sling. The patient has been doing well post-operatively and reports significant improvement in his left shoulder pain. The patient denies any numbness and tingling about his left shoulder. He has no signs of infections and denies fever, chills, nausea, or vomiting. Today he presents for a wound check, Prineo removal and XRays of the left shoulder.  [de-identified] : o Left Shoulder : Grashey, axillary and outlet views were obtained, no acute fractures, s/p left total shoulder arthroplasty with prothesis in place and no signs of loosening  [de-identified] : Left Upper Extremity\par o Shoulder :\par ¦ Inspection/Palpation : generalized tenderness, mild swelling, no deformities, surgical incision well healing with Prineo in place, clean, dry, and intact with no discharge or dehiscence. \par ¦ Range of Motion : PASSIVE FORWARD ELEVATION: Measured at 105 degrees, ACTIVE EXTERNAL ROTATION: Measured at 15 degrees, ACTIVE INTERNAL ROTATION: Measured at PSIS\par ¦ Strength : external rotation 5/5, internal rotation 5/5, supraspinatus 5/5\par ¦ Stability : no joint instability on provocative testing\par o Upper Arm : no tenderness, no swelling, no deformities\par o Muscle Bulk : no atrophy\par o Sensation : sensation intact to light touch\par o Skin : no skin rash or discoloration\par o Vascular Exam : no edema, no cyanosis, radial and ulnar pulses normal  [de-identified] : A prescription for Physical Therapy was provided for both shoulders.\par \par Sutures were removed and Steri-Strips were placed. He was instructed to allow the Steri-Strips to fall off on their own.\par \par He is to continue use of the sling as needed.\par \par Activity modifications and restrictions were discussed.\par \par FU 6 weeks.

## 2019-08-15 ENCOUNTER — APPOINTMENT (OUTPATIENT)
Dept: ORTHOPEDIC SURGERY | Facility: CLINIC | Age: 54
End: 2019-08-15
Payer: COMMERCIAL

## 2019-08-15 VITALS — WEIGHT: 208 LBS | HEIGHT: 65 IN | BODY MASS INDEX: 34.66 KG/M2

## 2019-08-15 PROCEDURE — 73030 X-RAY EXAM OF SHOULDER: CPT | Mod: LT

## 2019-08-15 PROCEDURE — 99024 POSTOP FOLLOW-UP VISIT: CPT

## 2019-08-15 NOTE — HISTORY OF PRESENT ILLNESS
[___ Weeks Post Op] : [unfilled] weeks post op [de-identified] : s/p Left total shoulder arthroplasty on 7/24/2018 [de-identified] : 54 year old male presents for an evaluation of left shoulder pain s/p Left total shoulder arthroplasty. He presents to the office wearing a sling. Patient was previously doing well until 8/9/2019. He states he was walking with his mother, who fell and while falling she grabbed his left arm. He developed severe shoulder pain, waking him at night. Limited ROM secondary to pain. He denies wound issues, fever, chills, radiating pain.  [de-identified] : Left Upper Extremity\par o Shoulder :\par ¦ Inspection/Palpation : tenderness over the greater tuberosity, mild swelling, no deformities, surgical incision well healing with Prineo in place, clean, dry, and intact with no discharge or dehiscence. \par ¦ Range of Motion : PASSIVE FORWARD ELEVATION: Measured at 70 degrees, ACTIVE EXTERNAL ROTATION: Measured at 10 degrees, ACTIVE INTERNAL ROTATION: Measured at PSIS\par ¦ Strength : external rotation 5/5, internal rotation 5/5, supraspinatus 5/5\par ¦ Stability : no joint instability on provocative testing\par ¦ Tests and Signs : Belly press test negative\par o Upper Arm : no tenderness, no swelling, no deformities\par o Muscle Bulk : no atrophy\par o Sensation : sensation intact to light touch\par o Skin : no skin rash or discoloration\par o Vascular Exam : no edema, no cyanosis, radial and ulnar pulses normal  [de-identified] : o Left Shoulder : Grashey, axillary and outlet views were obtained, no acute fractures, s/p left total shoulder arthroplasty with prothesis in place and no signs of loosening. Unchanged from prior films.  [de-identified] : An ultrasound of the shoulder was ordered to evaluate the rotator cuff. \par He is to continue use of the sling as needed.\par Activity modifications and restrictions were discussed.\par FU  after imaging

## 2019-08-15 NOTE — ADDENDUM
[FreeTextEntry1] : I, Narciso Linares, acted solely as a scribe for Dr. Alec Castaneda on this date 08/15/2019.

## 2019-08-15 NOTE — END OF VISIT
[FreeTextEntry3] : All medical record entries made by the Edwinibyanna were at my, Dr. Alec Castaneda, direction and personally dictated by me on 08/15/2019. I have reviewed the chart and agree that the record accurately reflects my personal performance of the history, physical exam, assessment and plan. I have also personally directed, reviewed, and agreed with the chart.

## 2019-08-19 ENCOUNTER — FORM ENCOUNTER (OUTPATIENT)
Age: 54
End: 2019-08-19

## 2019-08-20 ENCOUNTER — APPOINTMENT (OUTPATIENT)
Dept: ULTRASOUND IMAGING | Facility: CLINIC | Age: 54
End: 2019-08-20
Payer: COMMERCIAL

## 2019-08-20 ENCOUNTER — APPOINTMENT (OUTPATIENT)
Dept: RADIOLOGY | Facility: CLINIC | Age: 54
End: 2019-08-20
Payer: COMMERCIAL

## 2019-08-20 ENCOUNTER — OUTPATIENT (OUTPATIENT)
Dept: OUTPATIENT SERVICES | Facility: HOSPITAL | Age: 54
LOS: 1 days | End: 2019-08-20
Payer: COMMERCIAL

## 2019-08-20 DIAGNOSIS — Z98.890 OTHER SPECIFIED POSTPROCEDURAL STATES: Chronic | ICD-10-CM

## 2019-08-20 DIAGNOSIS — Z96.612 PRESENCE OF LEFT ARTIFICIAL SHOULDER JOINT: ICD-10-CM

## 2019-08-20 DIAGNOSIS — M54.5 LOW BACK PAIN: Chronic | ICD-10-CM

## 2019-08-20 DIAGNOSIS — Z96.611 PRESENCE OF RIGHT ARTIFICIAL SHOULDER JOINT: Chronic | ICD-10-CM

## 2019-08-20 PROCEDURE — 76882 US LMTD JT/FCL EVL NVASC XTR: CPT

## 2019-08-20 PROCEDURE — 73030 X-RAY EXAM OF SHOULDER: CPT | Mod: 26,LT

## 2019-08-20 PROCEDURE — 76882 US LMTD JT/FCL EVL NVASC XTR: CPT | Mod: 26,LT

## 2019-08-20 PROCEDURE — 73030 X-RAY EXAM OF SHOULDER: CPT

## 2019-09-17 ENCOUNTER — APPOINTMENT (OUTPATIENT)
Dept: ORTHOPEDIC SURGERY | Facility: CLINIC | Age: 54
End: 2019-09-17
Payer: COMMERCIAL

## 2019-09-17 VITALS — BODY MASS INDEX: 34.66 KG/M2 | WEIGHT: 208 LBS | HEIGHT: 65 IN

## 2019-09-17 PROCEDURE — 99024 POSTOP FOLLOW-UP VISIT: CPT

## 2019-09-17 NOTE — ADDENDUM
[FreeTextEntry1] : I, Nelia Castle, acted solely as a scribe for Dr. Alec Castaenda on this date 09/17/2019.

## 2019-09-17 NOTE — END OF VISIT
[FreeTextEntry3] : All medical record entries made by the Edwinibe were at my, Dr. Alec Castaneda, direction and personally dictated by me on 09/17/2019. I have reviewed the chart and agree that the record accurately reflects my personal performance of the history, physical exam, assessment and plan. I have also personally directed, reviewed, and agreed with the chart.

## 2019-09-17 NOTE — HISTORY OF PRESENT ILLNESS
[___ Weeks Post Op] : [unfilled] weeks post op [de-identified] : s/p Left total shoulder arthroplasty on 7/24/2019. [de-identified] : 54 year old male presents for an evaluation of left shoulder pain s/p Left total shoulder arthroplasty on 7/24/2019. He has been attending physical therapy and notes improvements in strength and ROM. The patient reports that he has been doing well post-operatively but has been experiencing a moderate aching pain along the biceps tendon of his left upper extremity. He has no signs of infections and denies fever, chills, nausea, or vomiting. Today he presents for a wound check. [de-identified] : Left Upper Extremity\par o Shoulder :\par ¦ Inspection/Palpation : no tenderness to palpation, no swelling, no deformities, surgical incision well healing, clean, dry, and intact with no discharge or dehiscence. \par ¦ Range of Motion : PASSIVE FORWARD ELEVATION: Measured at 135 degrees, ACTIVE EXTERNAL ROTATION: Measured at 30 degrees, ACTIVE INTERNAL ROTATION: Measured at PSIS\par ¦ Strength : external rotation 5/5, internal rotation 5/5, supraspinatus 5/5\par ¦ Stability : no joint instability on provocative testing\par ¦ Tests and Signs : Renner (-), Belly Press Test (-) \par o Upper Arm : no tenderness, no swelling, no deformities\par o Muscle Bulk : no atrophy\par o Sensation : sensation intact to light touch\par o Skin : no skin rash or discoloration\par o Vascular Exam : no edema, no cyanosis, radial and ulnar pulses normal  [de-identified] : o An US of the left shoulder was obtained at [facility] on 8/20/2019, revealed:\par 1. Mild to moderate tendinosis of the supraspinatus tendon with areas of intrasubstance tearing along the anterior to mid insertional fibers. No full-thickness or retracted tear. \par 2. Amorphous architecture of the subscapularis tendon without focal discontinuity or fluid-filled defect. This may be related to a combination of postsurgical changes and/or tendinosis. \par 3. Mild subacromial/subdeltoid bursitis. Small glenohumeral joint fluid.  [de-identified] : He is to continue with physical therapy of his bilateral shoulders, a prescription was provided.\par \par A note was provided stating that he can return to work on 11/1/2019.\par \par FU 6-8 weeks.

## 2019-12-16 ENCOUNTER — APPOINTMENT (OUTPATIENT)
Dept: ORTHOPEDIC SURGERY | Facility: CLINIC | Age: 54
End: 2019-12-16
Payer: COMMERCIAL

## 2019-12-16 PROCEDURE — 73030 X-RAY EXAM OF SHOULDER: CPT | Mod: RT

## 2019-12-16 PROCEDURE — 99213 OFFICE O/P EST LOW 20 MIN: CPT

## 2019-12-16 NOTE — HISTORY OF PRESENT ILLNESS
[de-identified] : 54 year old male presents for an evaluation of bilateral shoulder pain, he is s/p Right total shoulder arthroplasty on 3/11/2019 and s/p Left total shoulder arthroplasty on 7/24/2019. On 8/20/2019, an US of the left shoulder was obtained which revealed mild to moderate tendinosis of the supraspinatus tendon with areas of intrasubstance tearing along the anterior to mid insertional fibers, amorphous architecture of the subscapularis tendon without focal discontinuity or fluid-filled defect, as well as mild subacromial/subdeltoid bursitis. The patient reports that he recently was elevating his shoulders while grabbing mail out of his mailbox and noted severe pain bilaterally. He describes a moderate aching pain located along the anterior aspect of his bilateral shoulders. He also notes weakness of his left shoulder compared to his right. He has no other complaints at this time.

## 2019-12-16 NOTE — DISCUSSION/SUMMARY
[de-identified] : The underlying pathophysiology was reviewed in great detail with the patient as well as the various treatment options, including ice, analgesics, NSAIDs, Physical therapy, steroid injections. \par \par He is to continue with a home exercise program to maintain his strength and mobility. \par \par FU PRN.

## 2019-12-16 NOTE — ADDENDUM
[FreeTextEntry1] : I, Nelia Castle, acted solely as a scribe for Dr. Alec Castaneda on this date 12/16/2019.

## 2019-12-16 NOTE — END OF VISIT
[FreeTextEntry3] : All medical record entries made by the Scribe were at my, Dr. Alec Castaneda, direction and personally dictated by me on 12/16/2019. I have reviewed the chart and agree that the record accurately reflects my personal performance of the history, physical exam, assessment and plan. I have also personally directed, reviewed, and agreed with the chart.

## 2019-12-16 NOTE — PHYSICAL EXAM
[Normal LUE] : Left Upper Extremity: No scars, rashes, lesions, ulcers, skin intact [Normal Touch] : sensation intact for touch [Normal] : No swelling, no edema, normal pedal pulses and normal temperature [Normal RUE] : Right Upper Extremity: No scars, rashes, lesions, ulcers, skin intact [Obese] : obese [Poor Appearance] : well-appearing [Acute Distress] : not in acute distress [de-identified] : Right Upper Extremity\par o Shoulder :\par ¦ Inspection/Palpation : no tenderness, no swelling, no deformities, well healed surgical scar\par ¦ Range of Motion : ACTIVE FORWARD ELEVATION: Measured at 140 degrees, ACTIVE EXTERNAL ROTATION: Measured at 34 degrees, ACTIVE INTERNAL ROTATION: Measured at L1 \par ¦ Strength : external rotation 5/5, internal rotation 5/5, supraspinatus 5/5\par ¦ Stability : no joint instability on provocative testing\par ¦ Tests/Signs : Neer (-), Renner (-), Belly Press Test (-) \par o Upper Arm : no tenderness, no swelling, no deformities\par o Muscle Bulk : no atrophy\par o Sensation : sensation intact to light touch\par o Skin : no skin rash or discoloration\par o Vascular Exam : no edema, no cyanosis, radial and ulnar pulses normal \par \par Left Upper Extremity\par o Shoulder :\par ¦ Inspection/Palpation : no acromioclavicular joint tenderness, no swelling, no deformities, well healed surgical scar\par ¦ Range of Motion : ACTIVE FORWARD ELEVATION: Measured at 130 degrees, ACTIVE EXTERNAL ROTATION: Measured at 30 degrees, ACTIVE INTERNAL ROTATION: Measured at PSIS \par ¦ Strength : external rotation 5/5, internal rotation 5/5, supraspinatus 5/5\par ¦ Stability : no joint instability on provocative testing\par ¦ Tests/Signs : Neer (-), Renner (+ mild), Belly Press Test (-) \par o Upper Arm : no tenderness, no swelling, no deformities\par o Muscle Bulk : no atrophy\par o Sensation : sensation intact to light touch\par o Skin : no skin rash or discoloration\par o Vascular Exam : no edema, no cyanosis, radial and ulnar pulses normal  [de-identified] : o Right Shoulder : Grashey, Axillary and Outlet views were obtained, there are no soft tissue abnormalities, no fractures, alignment is normal, normal bone density, no bony lesions, s/p Right TSA with hardware in place and no signs of loosening.\par \par o Left Shoulder : Grashey, Axillary and Outlet views were obtained, there are no soft tissue abnormalities, no fractures, alignment is normal, normal bone density, no bony lesions, s/p Left TSA with hardware in place and no signs of loosening.

## 2020-01-01 NOTE — PHYSICAL THERAPY INITIAL EVALUATION ADULT - WEIGHT-BEARING RESTRICTIONS: SIT/STAND, REHAB EVAL
Check here if all serologies below were negative, non-reactive or immune. Otherwise select appropriate status. nonweight-bearing/left UE

## 2020-01-07 ENCOUNTER — TRANSCRIPTION ENCOUNTER (OUTPATIENT)
Age: 55
End: 2020-01-07

## 2020-02-16 ENCOUNTER — TRANSCRIPTION ENCOUNTER (OUTPATIENT)
Age: 55
End: 2020-02-16

## 2020-04-25 ENCOUNTER — MESSAGE (OUTPATIENT)
Age: 55
End: 2020-04-25

## 2020-05-04 LAB
SARS-COV-2 IGG SERPL IA-ACNC: <0.1 INDEX
SARS-COV-2 IGG SERPL QL IA: NEGATIVE

## 2020-07-24 ENCOUNTER — APPOINTMENT (OUTPATIENT)
Dept: ORTHOPEDIC SURGERY | Facility: CLINIC | Age: 55
End: 2020-07-24
Payer: COMMERCIAL

## 2020-07-24 PROCEDURE — 99213 OFFICE O/P EST LOW 20 MIN: CPT

## 2020-07-24 PROCEDURE — 73030 X-RAY EXAM OF SHOULDER: CPT | Mod: 50

## 2020-07-24 NOTE — DISCUSSION/SUMMARY
[de-identified] : The underlying pathophysiology was reviewed in great detail with the patient as well as the various treatment options, including ice, analgesics, NSAIDs, Physical therapy, steroid injections. \par \par A prescription for Physical Therapy was provided.\par \par Activity modifications and restrictions were discussed. I advised avoiding overhead lifting. I advised the patient to work on good posture. \par \par FU 6 weeks or prn \par \par All questions were answered, all alternatives discussed and the patient is in complete agreement with that plan. Follow-up appointment as instructed. Any issues and the patient will call or come in sooner.

## 2020-07-24 NOTE — PHYSICAL EXAM
[Normal RUE] : Right Upper Extremity: No scars, rashes, lesions, ulcers, skin intact [Normal LUE] : Left Upper Extremity: No scars, rashes, lesions, ulcers, skin intact [Normal Touch] : sensation intact for touch [Normal] : No swelling, no edema, normal pedal pulses and normal temperature [Obese] : obese [Acute Distress] : not in acute distress [Poor Appearance] : well-appearing [de-identified] : o Right Shoulder : Grashey, Axillary and Outlet views were obtained, there are no soft tissue abnormalities, no fractures, alignment is normal, normal bone density, no bony lesions, s/p Right TSA with hardware in place and no signs of loosening.\par \par o Left Shoulder : Grashey, Axillary and Outlet views were obtained, there are no soft tissue abnormalities, no fractures, alignment is normal, normal bone density, no bony lesions, s/p Left TSA with hardware in place and no signs of loosening. [de-identified] : Right Upper Extremity\par o Shoulder :\par ¦ Inspection/Palpation :  tenderness to palpation greater tuberosity, no swelling, no deformities, well healed surgical scar\par ¦ Range of Motion : ACTIVE FORWARD ELEVATION: Measured at 150 degrees, ACTIVE EXTERNAL ROTATION: Measured at 45 degrees, ACTIVE INTERNAL ROTATION: Measured at L1 \par ¦ Strength : external rotation 5/5, internal rotation 5/5, supraspinatus 5/5\par ¦ Stability : no joint instability on provocative testing\par ¦ Tests/Signs : Neer (-), Renner (-), Belly Press Test (-) \par o Upper Arm : no tenderness, no swelling, no deformities\par o Muscle Bulk : no atrophy\par o Sensation : sensation intact to light touch\par o Skin : no skin rash or discoloration\par o Vascular Exam : no edema, no cyanosis, radial and ulnar pulses normal \par \par Left Upper Extremity\par o Shoulder :\par ¦ Inspection/Palpation : no tenderness,  no swelling, no deformities, well healed surgical scar\par ¦ Range of Motion : ACTIVE FORWARD ELEVATION: Measured at 150 degrees, ACTIVE EXTERNAL ROTATION: Measured at 40 degrees, ACTIVE INTERNAL ROTATION: Measured at L3 \par ¦ Strength : external rotation 5/5, internal rotation 5/5, supraspinatus 5/5\par ¦ Stability : no joint instability on provocative testing\par ¦ Tests/Signs : Neer (-), Renner (-), Belly Press Test (-) \par o Upper Arm : no tenderness, no swelling, no deformities\par o Muscle Bulk : no atrophy\par o Sensation : sensation intact to light touch\par o Skin : no skin rash or discoloration\par o Vascular Exam : no edema, no cyanosis, radial and ulnar pulses normal

## 2020-07-24 NOTE — HISTORY OF PRESENT ILLNESS
[de-identified] : 55 year old male presents for an evaluation of bilateral shoulder pain, he is s/p Right total shoulder arthroplasty on 3/11/2019 and s/p Left total shoulder arthroplasty on 7/24/2019. On 8/20/2019, an US of the left shoulder was obtained which revealed mild to moderate tendinosis of the supraspinatus tendon with areas of intrasubstance tearing along the anterior to mid insertional fibers, amorphous architecture of the subscapularis tendon without focal discontinuity or fluid-filled defect, as well as mild subacromial/subdeltoid bursitis. \par After last visit patient was attending physical therapy noting improvements in strength, range of motion and pain. He has since discontinued due to lack of insurance coverage. \par He has not been as diligent with a home exercise program as he would like. He felt okay until two days ago. He reports sudden onset of sharp pain radiating down his right arm. He denies any injury or trauma to the area. He is interested in returing to Physcial therapy at this time. He has no other complaints at this time.

## 2020-08-05 NOTE — H&P PST ADULT - MS GEN HX ROS MEA POS PC
At risk for scoliosis of the lumbar spine with asymmetric muscle bulk. Advised consultation with ortho to determine need for imaging studies v PT v observation and possibility of bracing in the future.    joint swelling/arthritis/joint pain/back pain

## 2020-09-17 ENCOUNTER — TRANSCRIPTION ENCOUNTER (OUTPATIENT)
Age: 55
End: 2020-09-17

## 2020-11-14 ENCOUNTER — APPOINTMENT (OUTPATIENT)
Dept: ORTHOPEDIC SURGERY | Facility: CLINIC | Age: 55
End: 2020-11-14
Payer: COMMERCIAL

## 2020-11-14 VITALS
WEIGHT: 204 LBS | BODY MASS INDEX: 33.99 KG/M2 | TEMPERATURE: 97.7 F | SYSTOLIC BLOOD PRESSURE: 111 MMHG | HEART RATE: 84 BPM | HEIGHT: 65 IN | DIASTOLIC BLOOD PRESSURE: 71 MMHG

## 2020-11-14 DIAGNOSIS — M16.0 BILATERAL PRIMARY OSTEOARTHRITIS OF HIP: ICD-10-CM

## 2020-11-14 PROCEDURE — 99214 OFFICE O/P EST MOD 30 MIN: CPT | Mod: 25

## 2020-11-14 PROCEDURE — 20610 DRAIN/INJ JOINT/BURSA W/O US: CPT | Mod: LT

## 2020-11-14 PROCEDURE — 99072 ADDL SUPL MATRL&STAF TM PHE: CPT

## 2020-11-14 PROCEDURE — 73502 X-RAY EXAM HIP UNI 2-3 VIEWS: CPT | Mod: LT

## 2020-11-14 RX ORDER — BETAMETHA AC,SOD PHOS/WATER/PF 6 MG/ML
6 (3-3) VIAL (ML) INJECTION
Qty: 1 | Refills: 0 | Status: COMPLETED | OUTPATIENT
Start: 2020-11-14

## 2020-11-14 RX ORDER — LIDOCAINE HYDROCHLORIDE 10 MG/ML
1 INJECTION, SOLUTION INFILTRATION; PERINEURAL
Refills: 0 | Status: COMPLETED | OUTPATIENT
Start: 2020-11-14

## 2020-11-14 RX ADMIN — BETAMETHASONE ACETATE AND BETAMETHASONE SODIUM PHOSPHATE 3 MG/ML: 3; 3 INJECTION, SUSPENSION INTRA-ARTICULAR; INTRALESIONAL; INTRAMUSCULAR; SOFT TISSUE at 00:00

## 2020-11-14 RX ADMIN — LIDOCAINE HYDROCHLORIDE 5 %: 10 INJECTION, SOLUTION INFILTRATION; PERINEURAL at 00:00

## 2020-11-17 ENCOUNTER — OUTPATIENT (OUTPATIENT)
Dept: OUTPATIENT SERVICES | Facility: HOSPITAL | Age: 55
LOS: 1 days | End: 2020-11-17
Payer: COMMERCIAL

## 2020-11-17 ENCOUNTER — APPOINTMENT (OUTPATIENT)
Dept: MRI IMAGING | Facility: HOSPITAL | Age: 55
End: 2020-11-17

## 2020-11-17 DIAGNOSIS — Z96.611 PRESENCE OF RIGHT ARTIFICIAL SHOULDER JOINT: Chronic | ICD-10-CM

## 2020-11-17 DIAGNOSIS — M54.5 LOW BACK PAIN: Chronic | ICD-10-CM

## 2020-11-17 DIAGNOSIS — M51.26 OTHER INTERVERTEBRAL DISC DISPLACEMENT, LUMBAR REGION: ICD-10-CM

## 2020-11-17 DIAGNOSIS — Z98.890 OTHER SPECIFIED POSTPROCEDURAL STATES: Chronic | ICD-10-CM

## 2020-11-17 DIAGNOSIS — M54.16 RADICULOPATHY, LUMBAR REGION: ICD-10-CM

## 2020-11-17 DIAGNOSIS — M16.0 BILATERAL PRIMARY OSTEOARTHRITIS OF HIP: ICD-10-CM

## 2020-11-17 PROCEDURE — 72148 MRI LUMBAR SPINE W/O DYE: CPT | Mod: 26

## 2020-11-17 PROCEDURE — 73721 MRI JNT OF LWR EXTRE W/O DYE: CPT

## 2020-11-17 PROCEDURE — 73721 MRI JNT OF LWR EXTRE W/O DYE: CPT | Mod: 26,LT

## 2020-11-17 PROCEDURE — 72148 MRI LUMBAR SPINE W/O DYE: CPT

## 2020-11-19 NOTE — DISCHARGE NOTE PROVIDER - INSTRUCTIONS
regular  For Constipation :   • Increase your water intake. Drink at least 8 glasses of water daily.  • Try adding fiber to your diet by eating fruits, vegetables and foods that are rich in grains.  • If you do experience constipation, you may take an over-the-counter stool softener/laxative such as Maribell Colace, Senekot, miralax or  Milk of Magnesia.
33

## 2020-12-01 ENCOUNTER — APPOINTMENT (OUTPATIENT)
Dept: ORTHOPEDIC SURGERY | Facility: CLINIC | Age: 55
End: 2020-12-01
Payer: COMMERCIAL

## 2020-12-01 VITALS — WEIGHT: 204 LBS | TEMPERATURE: 97.7 F | HEIGHT: 65 IN | BODY MASS INDEX: 33.99 KG/M2

## 2020-12-01 DIAGNOSIS — M16.12 UNILATERAL PRIMARY OSTEOARTHRITIS, LEFT HIP: ICD-10-CM

## 2020-12-01 PROCEDURE — 99213 OFFICE O/P EST LOW 20 MIN: CPT

## 2020-12-01 PROCEDURE — 99072 ADDL SUPL MATRL&STAF TM PHE: CPT

## 2020-12-09 ENCOUNTER — OUTPATIENT (OUTPATIENT)
Dept: OUTPATIENT SERVICES | Facility: HOSPITAL | Age: 55
LOS: 1 days | End: 2020-12-09
Payer: COMMERCIAL

## 2020-12-09 ENCOUNTER — APPOINTMENT (OUTPATIENT)
Dept: ULTRASOUND IMAGING | Facility: CLINIC | Age: 55
End: 2020-12-09
Payer: COMMERCIAL

## 2020-12-09 ENCOUNTER — RESULT REVIEW (OUTPATIENT)
Age: 55
End: 2020-12-09

## 2020-12-09 DIAGNOSIS — Z98.890 OTHER SPECIFIED POSTPROCEDURAL STATES: Chronic | ICD-10-CM

## 2020-12-09 DIAGNOSIS — Z96.611 PRESENCE OF RIGHT ARTIFICIAL SHOULDER JOINT: Chronic | ICD-10-CM

## 2020-12-09 DIAGNOSIS — M54.5 LOW BACK PAIN: Chronic | ICD-10-CM

## 2020-12-09 DIAGNOSIS — Z00.8 ENCOUNTER FOR OTHER GENERAL EXAMINATION: ICD-10-CM

## 2020-12-09 PROCEDURE — 20611 DRAIN/INJ JOINT/BURSA W/US: CPT

## 2020-12-09 PROCEDURE — 20611 DRAIN/INJ JOINT/BURSA W/US: CPT | Mod: LT

## 2021-01-27 ENCOUNTER — TRANSCRIPTION ENCOUNTER (OUTPATIENT)
Age: 56
End: 2021-01-27

## 2021-01-27 ENCOUNTER — EMERGENCY (EMERGENCY)
Facility: HOSPITAL | Age: 56
LOS: 1 days | Discharge: HOME | End: 2021-01-27
Attending: EMERGENCY MEDICINE | Admitting: EMERGENCY MEDICINE
Payer: COMMERCIAL

## 2021-01-27 VITALS
SYSTOLIC BLOOD PRESSURE: 131 MMHG | HEART RATE: 85 BPM | DIASTOLIC BLOOD PRESSURE: 88 MMHG | OXYGEN SATURATION: 98 % | TEMPERATURE: 98 F | RESPIRATION RATE: 17 BRPM

## 2021-01-27 VITALS
SYSTOLIC BLOOD PRESSURE: 135 MMHG | DIASTOLIC BLOOD PRESSURE: 92 MMHG | RESPIRATION RATE: 18 BRPM | TEMPERATURE: 97 F | HEART RATE: 95 BPM | OXYGEN SATURATION: 97 % | HEIGHT: 65 IN

## 2021-01-27 DIAGNOSIS — Z96.611 PRESENCE OF RIGHT ARTIFICIAL SHOULDER JOINT: Chronic | ICD-10-CM

## 2021-01-27 DIAGNOSIS — Z98.890 OTHER SPECIFIED POSTPROCEDURAL STATES: Chronic | ICD-10-CM

## 2021-01-27 DIAGNOSIS — M54.5 LOW BACK PAIN: Chronic | ICD-10-CM

## 2021-01-27 LAB
ALBUMIN SERPL ELPH-MCNC: 4 G/DL — SIGNIFICANT CHANGE UP (ref 3.3–5)
ALP SERPL-CCNC: 80 U/L — SIGNIFICANT CHANGE UP (ref 30–120)
ALT FLD-CCNC: 38 U/L DA — SIGNIFICANT CHANGE UP (ref 10–60)
ANION GAP SERPL CALC-SCNC: 11 MMOL/L — SIGNIFICANT CHANGE UP (ref 5–17)
APPEARANCE UR: CLEAR — SIGNIFICANT CHANGE UP
APTT BLD: 31.7 SEC — SIGNIFICANT CHANGE UP (ref 27.5–35.5)
AST SERPL-CCNC: 20 U/L — SIGNIFICANT CHANGE UP (ref 10–40)
BACTERIA # UR AUTO: ABNORMAL
BASOPHILS # BLD AUTO: 0.08 K/UL — SIGNIFICANT CHANGE UP (ref 0–0.2)
BASOPHILS NFR BLD AUTO: 1.2 % — SIGNIFICANT CHANGE UP (ref 0–2)
BILIRUB SERPL-MCNC: 0.2 MG/DL — SIGNIFICANT CHANGE UP (ref 0.2–1.2)
BILIRUB UR-MCNC: NEGATIVE — SIGNIFICANT CHANGE UP
BUN SERPL-MCNC: 20 MG/DL — SIGNIFICANT CHANGE UP (ref 7–23)
CALCIUM SERPL-MCNC: 9.4 MG/DL — SIGNIFICANT CHANGE UP (ref 8.4–10.5)
CHLORIDE SERPL-SCNC: 105 MMOL/L — SIGNIFICANT CHANGE UP (ref 96–108)
CK MB BLD-MCNC: 0.8 % — SIGNIFICANT CHANGE UP (ref 0–3.5)
CK MB CFR SERPL CALC: 0.8 NG/ML — SIGNIFICANT CHANGE UP (ref 0–3.6)
CK SERPL-CCNC: 102 U/L — SIGNIFICANT CHANGE UP (ref 39–308)
CO2 SERPL-SCNC: 25 MMOL/L — SIGNIFICANT CHANGE UP (ref 22–31)
COLOR SPEC: YELLOW — SIGNIFICANT CHANGE UP
CREAT SERPL-MCNC: 1.03 MG/DL — SIGNIFICANT CHANGE UP (ref 0.5–1.3)
DIFF PNL FLD: NEGATIVE — SIGNIFICANT CHANGE UP
EOSINOPHIL # BLD AUTO: 0.28 K/UL — SIGNIFICANT CHANGE UP (ref 0–0.5)
EOSINOPHIL NFR BLD AUTO: 4.1 % — SIGNIFICANT CHANGE UP (ref 0–6)
EPI CELLS # UR: SIGNIFICANT CHANGE UP
GLUCOSE SERPL-MCNC: 100 MG/DL — HIGH (ref 70–99)
GLUCOSE UR QL: NEGATIVE MG/DL — SIGNIFICANT CHANGE UP
HCT VFR BLD CALC: 46.9 % — SIGNIFICANT CHANGE UP (ref 39–50)
HGB BLD-MCNC: 15.9 G/DL — SIGNIFICANT CHANGE UP (ref 13–17)
IMM GRANULOCYTES NFR BLD AUTO: 0.3 % — SIGNIFICANT CHANGE UP (ref 0–1.5)
INR BLD: 1.05 RATIO — SIGNIFICANT CHANGE UP (ref 0.88–1.16)
KETONES UR-MCNC: NEGATIVE — SIGNIFICANT CHANGE UP
LEUKOCYTE ESTERASE UR-ACNC: NEGATIVE — SIGNIFICANT CHANGE UP
LYMPHOCYTES # BLD AUTO: 1.95 K/UL — SIGNIFICANT CHANGE UP (ref 1–3.3)
LYMPHOCYTES # BLD AUTO: 28.5 % — SIGNIFICANT CHANGE UP (ref 13–44)
MCHC RBC-ENTMCNC: 28.3 PG — SIGNIFICANT CHANGE UP (ref 27–34)
MCHC RBC-ENTMCNC: 33.9 GM/DL — SIGNIFICANT CHANGE UP (ref 32–36)
MCV RBC AUTO: 83.5 FL — SIGNIFICANT CHANGE UP (ref 80–100)
MONOCYTES # BLD AUTO: 0.85 K/UL — SIGNIFICANT CHANGE UP (ref 0–0.9)
MONOCYTES NFR BLD AUTO: 12.4 % — SIGNIFICANT CHANGE UP (ref 2–14)
NEUTROPHILS # BLD AUTO: 3.66 K/UL — SIGNIFICANT CHANGE UP (ref 1.8–7.4)
NEUTROPHILS NFR BLD AUTO: 53.5 % — SIGNIFICANT CHANGE UP (ref 43–77)
NITRITE UR-MCNC: NEGATIVE — SIGNIFICANT CHANGE UP
NRBC # BLD: 0 /100 WBCS — SIGNIFICANT CHANGE UP (ref 0–0)
PH UR: 5 — SIGNIFICANT CHANGE UP (ref 5–8)
PLATELET # BLD AUTO: 260 K/UL — SIGNIFICANT CHANGE UP (ref 150–400)
POTASSIUM SERPL-MCNC: 4.1 MMOL/L — SIGNIFICANT CHANGE UP (ref 3.5–5.3)
POTASSIUM SERPL-SCNC: 4.1 MMOL/L — SIGNIFICANT CHANGE UP (ref 3.5–5.3)
PROT SERPL-MCNC: 7.7 G/DL — SIGNIFICANT CHANGE UP (ref 6–8.3)
PROT UR-MCNC: 15 MG/DL
PROTHROM AB SERPL-ACNC: 12.7 SEC — SIGNIFICANT CHANGE UP (ref 10.6–13.6)
RBC # BLD: 5.62 M/UL — SIGNIFICANT CHANGE UP (ref 4.2–5.8)
RBC # FLD: 13.2 % — SIGNIFICANT CHANGE UP (ref 10.3–14.5)
SODIUM SERPL-SCNC: 141 MMOL/L — SIGNIFICANT CHANGE UP (ref 135–145)
SP GR SPEC: 1.02 — SIGNIFICANT CHANGE UP (ref 1.01–1.02)
TROPONIN I SERPL-MCNC: 0 NG/ML — LOW (ref 0.02–0.06)
UROBILINOGEN FLD QL: NEGATIVE MG/DL — SIGNIFICANT CHANGE UP
WBC # BLD: 6.84 K/UL — SIGNIFICANT CHANGE UP (ref 3.8–10.5)
WBC # FLD AUTO: 6.84 K/UL — SIGNIFICANT CHANGE UP (ref 3.8–10.5)
WBC UR QL: SIGNIFICANT CHANGE UP

## 2021-01-27 PROCEDURE — 85025 COMPLETE CBC W/AUTO DIFF WBC: CPT

## 2021-01-27 PROCEDURE — 70498 CT ANGIOGRAPHY NECK: CPT

## 2021-01-27 PROCEDURE — 82553 CREATINE MB FRACTION: CPT

## 2021-01-27 PROCEDURE — 71046 X-RAY EXAM CHEST 2 VIEWS: CPT | Mod: 26

## 2021-01-27 PROCEDURE — 84484 ASSAY OF TROPONIN QUANT: CPT

## 2021-01-27 PROCEDURE — 82550 ASSAY OF CK (CPK): CPT

## 2021-01-27 PROCEDURE — 81001 URINALYSIS AUTO W/SCOPE: CPT

## 2021-01-27 PROCEDURE — 70496 CT ANGIOGRAPHY HEAD: CPT | Mod: 26

## 2021-01-27 PROCEDURE — 99284 EMERGENCY DEPT VISIT MOD MDM: CPT | Mod: 25

## 2021-01-27 PROCEDURE — 93010 ELECTROCARDIOGRAM REPORT: CPT

## 2021-01-27 PROCEDURE — 85610 PROTHROMBIN TIME: CPT

## 2021-01-27 PROCEDURE — 70498 CT ANGIOGRAPHY NECK: CPT | Mod: 26

## 2021-01-27 PROCEDURE — 96360 HYDRATION IV INFUSION INIT: CPT

## 2021-01-27 PROCEDURE — 36415 COLL VENOUS BLD VENIPUNCTURE: CPT

## 2021-01-27 PROCEDURE — 99285 EMERGENCY DEPT VISIT HI MDM: CPT

## 2021-01-27 PROCEDURE — 70450 CT HEAD/BRAIN W/O DYE: CPT | Mod: 26

## 2021-01-27 PROCEDURE — 80053 COMPREHEN METABOLIC PANEL: CPT

## 2021-01-27 PROCEDURE — 93005 ELECTROCARDIOGRAM TRACING: CPT

## 2021-01-27 PROCEDURE — 85730 THROMBOPLASTIN TIME PARTIAL: CPT

## 2021-01-27 PROCEDURE — 71046 X-RAY EXAM CHEST 2 VIEWS: CPT

## 2021-01-27 PROCEDURE — 70496 CT ANGIOGRAPHY HEAD: CPT

## 2021-01-27 PROCEDURE — 70450 CT HEAD/BRAIN W/O DYE: CPT

## 2021-01-27 RX ORDER — MECLIZINE HCL 12.5 MG
1 TABLET ORAL
Qty: 15 | Refills: 0
Start: 2021-01-27 | End: 2021-01-31

## 2021-01-27 RX ORDER — SODIUM CHLORIDE 9 MG/ML
1000 INJECTION INTRAMUSCULAR; INTRAVENOUS; SUBCUTANEOUS ONCE
Refills: 0 | Status: COMPLETED | OUTPATIENT
Start: 2021-01-27 | End: 2021-01-27

## 2021-01-27 RX ORDER — MECLIZINE HCL 12.5 MG
25 TABLET ORAL ONCE
Refills: 0 | Status: COMPLETED | OUTPATIENT
Start: 2021-01-27 | End: 2021-01-27

## 2021-01-27 RX ADMIN — Medication 25 MILLIGRAM(S): at 14:18

## 2021-01-27 RX ADMIN — SODIUM CHLORIDE 1000 MILLILITER(S): 9 INJECTION INTRAMUSCULAR; INTRAVENOUS; SUBCUTANEOUS at 14:16

## 2021-01-27 RX ADMIN — SODIUM CHLORIDE 1000 MILLILITER(S): 9 INJECTION INTRAMUSCULAR; INTRAVENOUS; SUBCUTANEOUS at 15:16

## 2021-01-27 NOTE — ED PROVIDER NOTE - PHYSICAL EXAMINATION
Neuro- A&Ox3. Speech clear, without articulation or word-finding difficulties. Eyes- PERRL bilaterally. EOMs in tact. No nystagmus. CN II-XII in tact. No facial droop. No sensory or motor deficits throughout extremities bilaterally. Strength 5/5 throughout upper and lower extremities. No pronator drift. Gait stable.finger-to-nose in tact.

## 2021-01-27 NOTE — ED PROVIDER NOTE - CARE PROVIDER_API CALL
Zuhair Maradiaga)  Neurology  [address] [place] [address]  Phone: [phone]  Fax: [phone]  Follow Up Time: 1-3 Days    Yehuda Tristan  CARDIOVASCULAR DISEASE  [address] [facility], [address]  [place] [address]  Phone: [phone]  Fax: [phone]  Follow Up Time: 1-3 Days    Cayden Roger  NEUROLOGY  [address], [place] [id number]  Phone: [phone]  Fax: [phone]  Follow Up Time: 1-3 Days

## 2021-01-27 NOTE — ED PROVIDER NOTE - NSFOLLOWUPINSTRUCTIONS_ED_ALL_ED_FT
1. FOLLOW UP WITH YOUR PRIMARY DOCTOR IN 24-48 HOURS.   2. FOLLOW UP WITH ALL SPECIALIST DISCUSSED DURING YOUR VISIT.   3. TAKE ALL MEDICATIONS PRESCRIBED IN THE ER IF ANY ARE PRESCRIBED. CONTINUE YOUR HOME MEDICATIONS UNLESS OTHERWISE ADVISED DIFFERENTLY.   4. RETURN FOR WORSENING SYMPTOMS OR CONCERNS INCLUDING BUT NOT LIMITED TO FEVER, CHEST PAIN, OR TROUBLE BREATHING OR ANY OTHER CONCERNS  take meclizine 25mg every 8 hours as needed for dizziness.  follow up with your cardiologist  follow up with neurologist.                DIZZINESS - AfterCare(R) Instructions(ER/ED)           Dizziness    WHAT YOU NEED TO KNOW:    Dizziness is a feeling of being off balance or unsteady. Common causes of dizziness are an inner ear fluid imbalance or a lack of oxygen in your blood. Dizziness may be acute (lasts 3 days or less) or chronic (lasts longer than 3 days). You may have dizzy spells that last from seconds to a few hours.     DISCHARGE INSTRUCTIONS:    Return to the emergency department if:   •You have a headache and a stiff neck.      •You have shaking chills and a fever.       •You vomit over and over with no relief.       •Your vomit or bowel movements are red or black.       •You have pain in your chest, back, or abdomen.       •You have numbness, especially in your face, arms, or legs.       •You have trouble moving your arms or legs.       •You are confused.       Contact your healthcare provider if:   •You have a fever.       •Your symptoms do not get better with treatment.       •You have questions or concerns about your condition or care.       Manage your symptoms:   •Do not drive or operate heavy machinery when you are dizzy.       •Get up slowly from sitting or lying down.       •Drink plenty of liquids. Liquids help prevent dehydration. Ask how much liquid to drink each day and which liquids are best for you.      Follow up with your healthcare provider as directed: Write down your questions so you remember to ask them during your visits.

## 2021-01-27 NOTE — ED PROVIDER NOTE - OBJECTIVE STATEMENT
54 y/o male with PMHx of vertigo, and migraines presents ot the ED with dizziness for 3 days. Pt reports constant dizziness descried as room spinning/ "his head is full of water" with unsteady feeling. Pt reports episode of associated sharp L sided chest pain on 1/21 after walking up the stairs that resolved after 2 minutes. Pt took meclizine which provided minimal relief. Pt reports associated headache and nausea but states he has headache frequently at baseline. Pt was seen UC who advised him to come to the ED for evaluation. Pt had a stress test 5 years ago without acute findings. Pt denies fever, SOB, vomiting , vison changes, numbness, tingling, gait disturbance.    PMD: Amico 56 y/o male with PMHx of vertigo, and migraines presents ot the ED with dizziness for 3 days. Pt reports constant dizziness descried as room spinning/ "his head is full of water" with unsteady feeling. Pt reports episode of associated sharp L sided chest pain on 1/21 after walking up the stairs that resolved after 2 minutes. pt also notes a "near syncope" event that occurred on 1/24 without loc. Pt took meclizine which provided minimal relief. Pt reports associated headache and nausea but states he has headache frequently at baseline and current headache is similar to previous. Pt was seen UC who advised him to come to the ED for evaluation. Pt had a stress test 5 years ago without acute findings. Pt denies fever, SOB, vomiting , vison changes, numbness, tingling, gait disturbance.    PMD: Amico

## 2021-01-27 NOTE — ED PROVIDER NOTE - CHPI ED SYMPTOMS NEG
no SOB, no tingling/no blurred vision/no fever/no numbness/no vomiting no SOB, no tingling/no blurred vision/no confusion/no fever/no loss of consciousness/no numbness/no vomiting/no change in level of consciousness

## 2021-01-27 NOTE — ED PROVIDER NOTE - NS_ ATTENDINGSCRIBEDETAILS _ED_A_ED_FT
Masoud Vega MD - The scribe's documentation has been prepared under my direction and personally reviewed by me in its entirety. I confirm that the note above accurately reflects all work, treatment, procedures, and medical decision making performed by me.

## 2021-01-27 NOTE — ED ADULT NURSE NOTE - OBJECTIVE STATEMENT
pt sent from urgent care for abnormal EKG, pt went to  today fro dizziness x 2-3 days, states he has hx of vertigo in the past and feels similar. pt denies numbness, tingling, nausea, vomiting, PO intolerance, neck pain, recent travel, sick contacts, SHx, headache, blurred vision, sinus congestion, hematuria, chest pain, sob, blurred vision or any other complaints.

## 2021-01-27 NOTE — ED PROVIDER NOTE - PATIENT PORTAL LINK FT
You can access the FollowMyHealth Patient Portal offered by [facility] by registering at the following website: [email or web address]. By joining [facility]’s FollowMyHealth portal, you will also be able to view your health information using other applications (apps) compatible with our system.

## 2021-01-27 NOTE — ED ADULT NURSE NOTE - ISOLATION TYPE:
RADIATION ONCOLOGY FOLLOW UP NOTE      Patient Name: MARY SKELTON   YOB: 1952  MRN: [id number]  Account Number: [id number]  Dictating MD: Randall Cloud M.D.    Date of Service: April 03, 2019    Diagnosis:  [ICD10] C61 Malignant neoplasm of prostate    Reason for Visit: The patient returns today for routine follow up 3 months after completion of radiation therapy.    Treatment Summary:  Radiation Oncology - Course: 1 Protocol:    Treatment Site Current Dose Modality From To Elapsed Days Fx.   pelvis  4,500 cGy     10X 11/08/2018 12/13/2018  35 25   prostate/prox SV  3,400 cGy     10X 12/14/2018  1/09/2019  26 17               History: The patient is a 66 -year-old male with the above diagnosis and treatment rendered who returns for a routine follow-up visit after the completion of radiation therapy.        Interval History:    3/8/19 PSA = 6 ng/ml  He is seen with Polish .  He reports some musculoskeletal pain immediately after receiving last LHRN/ADT injection, no pain at this time.  Generalized weakness but no focal weakness or numbness of extremities.  No new GI or  complaints.    Past Medical History:    The patient reported the following problems: Unspecified atrial fibrillation; Heart failure, unspecified; Pain, unspecified; Pure hypercholesterolemia; Essential (primary) hypertension; Transient cerebral ischemic attack, unspecified.     Medication List (Reported By Patient):     The patient reported no changes to their medications. Their current medications include:  amlodipine; aspirin [acetylsalicylic acid]; atorvastatin; Plavix [clopidogrel]; lisinopril; metoprolol succinate; varenicline; tamsulosin.     Allergies (Reported By Patient):     The patient reported no changes to their allergies. Their current allergies include:  No Known Drug Allergies.        Physical Exam:    General: WD, WN male in no acute distress.  Vital Signs:    Date 4/3/2019   Time 11:00 AM      Weight (kg) (kg) 80.9     R 14     P 100     B/P 125/75     Oxygen Saturation (%) 92   ECOG Performance Status: Record date - 4/3/2019 11:00 AM; ECOG Grade 0 - Fully active without restriction    HEENT: Sclera anicteric.   Lungs: Normal respiratory effort.  Neurologic: Alert and oriented, displays no tremor, Gait normal. Motor strength 5/5 proximally and distally all 4 extremities.  Psychiatric: Normal mood and affect, speech normal, behavior is normal    Impression: Mr. Delvis Blackmon is a pleasant 66 year old male with very high risk prostate cancer.  We recommended that he continue long term androgen blockade for 2 years following completion of radiation under the direction of Dr. Clemons..     Plan: Return to clinic in 6 months or sooner should the need arise. I instructed him to continue follow-up with his other physicians as well.        This document was dictated using voice recognition software.  Authenticated by Krystle Cloud M.D. on April  3, 2019 at 2:48 PM  KRYSTLE CLOUD MD    CC: RENETTA JOHNSON ALLEN MD YAJNIK, SANTOSH MD      None

## 2021-01-27 NOTE — ED ADULT NURSE NOTE - PSH
Chronic back pain greater than 3 months duration  2/26/ 2013 temporary placement of generator,  removed 2013  Chronic lower back pain    History of hemorrhoidectomy  2018  History of hernia repair  ventral 2018  S/P knee surgery  right  2004  S/P shoulder replacement, right  3/19

## 2021-01-27 NOTE — ED ADULT NURSE NOTE - CHPI ED NUR SYMPTOMS NEG
no back pain/no chest pain/no chills/no congestion/no diaphoresis/no fever/no nausea/no shortness of breath/no syncope/no vomiting

## 2021-01-27 NOTE — ED ADULT NURSE NOTE - PMH
BMI 33.0-33.9,adult    Chronic lower back pain    GERD (gastroesophageal reflux disease)    Herniated lumbar intervertebral disc    OA (osteoarthritis)    Obesity (BMI 30.0-34.9)    Osteoarthritis of right shoulder    Sarcoidosis    Sleep apnea  CPAP  Vertigo

## 2021-01-27 NOTE — ED ADULT TRIAGE NOTE - CHIEF COMPLAINT QUOTE
" Urgent Care sent me here, I went there for dizziness that started Sunday, they told me to go to an ER because of abnormal EKG " " Urgent Care sent me here, I went there for dizziness that started Sunday, they told me to go to an ER because of abnormal EKG, denies chest pain "

## 2021-01-27 NOTE — ED PROVIDER NOTE - CLINICAL SUMMARY MEDICAL DECISION MAKING FREE TEXT BOX
56 y/o male with PMHx of vertigo, and migraines presents ot the ED with dizziness for 3 days. Pt reports constant dizziness descried as room spinning/ "his head is full of water" with unsteady feeling. Pt reports episode of associated sharp L sided chest pain on 1/21 after walking up the stairs that resolved after 2 minutes. Pt took meclizine which provided minimal relief. Pt reports associated headache and nausea but states he has headache frequently at baseline and current headache is similar to previous. Pt was seen UC who advised him to come to the ED for evaluation. Pt had a stress test 5 years ago without acute findings. will do labs, trop , ekg, cxr, ct head, meclizine, ivf, neuro consult dr sparrow will see pt in ed

## 2021-01-27 NOTE — ED ADULT NURSE NOTE - CHIEF COMPLAINT QUOTE
" Urgent Care sent me here, I went there for dizziness that started Sunday, they told me to go to an ER because of abnormal EKG, denies chest pain "

## 2021-01-27 NOTE — ED PROVIDER NOTE - PROVIDER TOKENS
PROVIDER:[TOKEN:[[id number]],FOLLOWUP:[1-3 Days]],PROVIDER:[TOKEN:[[id number]],FOLLOWUP:[1-3 Days]],PROVIDER:[TOKEN:[[id number]],FOLLOWUP:[1-3 Days]]

## 2021-01-27 NOTE — ED PROVIDER NOTE - PROGRESS NOTE DETAILS
pt seen by dr sparrow neuro advised cta head/neck and re-eval labs and imaging reviewed. pt improved. advised neuro and cardio follow up. will rx meclizine. All imaging and labs reviewed. all results reviewed with pt including abnormal results. pt given a copy of results. pt advised to follow up with pmd regarding abnormal results. All questions answered and concerns addressed. pt verbalized understanding and agreement with plan and dx. pt advised on next step and when/where to follow up. pt advised on all take home and otc medications. pt advised to follow up with PMD. pt advised to return to ed for worsenng symptoms including fever, cp, sob. will dc. Fidelia MILLER for ED attending, Dr. Vega: 56 y/o male with PMHx of vertigo, and migraines presents to the ED c/o dizziness since Sunday morning. Pt states he had near syncope episode on Sunday without LOC. Pt reports chest pain. Pt went to  and EKG was abnormal and was sent to the ED for eval.   PE: pt awake in NAD, alert and oriented x 3, ambulates without difficulty, no motor or sensory deficits.

## 2021-02-03 ENCOUNTER — NON-APPOINTMENT (OUTPATIENT)
Age: 56
End: 2021-02-03

## 2021-02-03 ENCOUNTER — APPOINTMENT (OUTPATIENT)
Dept: CARDIOLOGY | Facility: CLINIC | Age: 56
End: 2021-02-03
Payer: COMMERCIAL

## 2021-02-03 VITALS
SYSTOLIC BLOOD PRESSURE: 149 MMHG | DIASTOLIC BLOOD PRESSURE: 98 MMHG | WEIGHT: 210 LBS | HEART RATE: 80 BPM | HEIGHT: 65 IN | OXYGEN SATURATION: 97 % | BODY MASS INDEX: 34.99 KG/M2

## 2021-02-03 VITALS — SYSTOLIC BLOOD PRESSURE: 134 MMHG | DIASTOLIC BLOOD PRESSURE: 90 MMHG

## 2021-02-03 PROCEDURE — 99205 OFFICE O/P NEW HI 60 MIN: CPT | Mod: 25

## 2021-02-03 PROCEDURE — 93000 ELECTROCARDIOGRAM COMPLETE: CPT

## 2021-02-03 PROCEDURE — 99072 ADDL SUPL MATRL&STAF TM PHE: CPT

## 2021-02-03 NOTE — PHYSICAL EXAM
[Well Groomed] : well groomed [General Appearance - In No Acute Distress] : no acute distress [Eyelids - No Xanthelasma] : the eyelids demonstrated no xanthelasmas [FreeTextEntry1] : No JVD [Heart Rate And Rhythm] : heart rate and rhythm were normal [Heart Sounds] : normal S1 and S2 [Murmurs] : no murmurs present [Edema] : no peripheral edema present [Respiration, Rhythm And Depth] : normal respiratory rhythm and effort [Auscultation Breath Sounds / Voice Sounds] : lungs were clear to auscultation bilaterally [Bowel Sounds] : normal bowel sounds [Abdomen Soft] : soft [Gait - Sufficient For Exercise Testing] : the gait was sufficient for exercise testing [Nail Clubbing] : no clubbing of the fingernails [Cyanosis, Localized] : no localized cyanosis [Skin Color & Pigmentation] : normal skin color and pigmentation [] : no rash [Oriented To Time, Place, And Person] : oriented to person, place, and time [Affect] : the affect was normal [Mood] : the mood was normal

## 2021-02-03 NOTE — HISTORY OF PRESENT ILLNESS
[FreeTextEntry1] : Scot Aviles is a 55-year-old man with a history of vertigo, chronic back pain, gastroesophageal reflux, osteoarthritis, sarcoidosis (non-cardiac, formerly trated with steroids, "in remission"), former tobacco use (now vapes), and obstructive sleep apnea who presents for cardiology consultation. He was evaluated in the [facility] emergency department on January 27, 2021 after presenting to urgent care center with complaints of dizziness, near syncope, and an abnormal electrocardiogram several days earlier.\par \par I reviewed his hospital chart.  In summary, labs, including a CBC, metabolic panel, troponin I, and CPK were normal. CT imaging of the brain revealed volume loss with microvascular disease; CT angiogram of the brain was normal. Chest radiograph revealed left lung base subsegmental atelectasis. Electrocardiogram was interpreted as sinus rhythm with anterior infarct (no change compared to a previous tracing from July 5, 2019).\par \par He has no past history of heart disease and has not been experiencing typical angina.  He describes daily episodes of very brief (lasting "a second") sharp chest that occurs sporadically - often at rest; unable to identify any clear exacerbating or alleviating factors.  His ECG was interpreted as suggesting the presence of a myocardial infarction - there is NO past history of ischemic heart disease / coronary artery disease / myocardial infarction.  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

## 2021-02-03 NOTE — REASON FOR VISIT
[Follow-Up - From Hospitalization] : follow-up of a recent hospitalization for [Abnormal ECG] : an abnormal ECG [Dizziness] : dizziness [FreeTextEntry1] : near-syncope [Spouse] : spouse

## 2021-02-03 NOTE — DISCUSSION/SUMMARY
[FreeTextEntry1] : \par Abnormal ECG: ECG is abnormal with inferior Q waves and poor precordial R wave progression -- previous ECGs have had similar abnormalities but there is no past history of infarct; given complaints of chest pain (although atypical for angina), abnormal ECG, and risk factors for heart disease I recommend an ischemia evaluation and asked him to return for exercise myocardial perfusion SPECT.\par \par Elevated blood pressure in the absence of established hypertension: Mild; he describes little medical attention - so I recommended home blood pressure monitoring and asked him to contact me if blood pressure is persistently high (he attributes to his blood pressure elevation to a stressful afternoon at work); I did suggest a low-sodium diet and additional weight loss.

## 2021-02-13 ENCOUNTER — LABORATORY RESULT (OUTPATIENT)
Age: 56
End: 2021-02-13

## 2021-02-13 ENCOUNTER — APPOINTMENT (OUTPATIENT)
Dept: CARDIOLOGY | Facility: CLINIC | Age: 56
End: 2021-02-13
Payer: COMMERCIAL

## 2021-02-13 ENCOUNTER — APPOINTMENT (OUTPATIENT)
Dept: DISASTER EMERGENCY | Facility: CLINIC | Age: 56
End: 2021-02-13

## 2021-02-13 PROCEDURE — 99072 ADDL SUPL MATRL&STAF TM PHE: CPT

## 2021-02-13 PROCEDURE — 93306 TTE W/DOPPLER COMPLETE: CPT

## 2021-02-16 ENCOUNTER — OUTPATIENT (OUTPATIENT)
Dept: OUTPATIENT SERVICES | Facility: HOSPITAL | Age: 56
LOS: 1 days | End: 2021-02-16
Payer: COMMERCIAL

## 2021-02-16 ENCOUNTER — NON-APPOINTMENT (OUTPATIENT)
Age: 56
End: 2021-02-16

## 2021-02-16 ENCOUNTER — APPOINTMENT (OUTPATIENT)
Dept: CV DIAGNOSTICS | Facility: HOSPITAL | Age: 56
End: 2021-02-16

## 2021-02-16 DIAGNOSIS — R07.89 OTHER CHEST PAIN: ICD-10-CM

## 2021-02-16 DIAGNOSIS — M54.5 LOW BACK PAIN: Chronic | ICD-10-CM

## 2021-02-16 DIAGNOSIS — Z98.890 OTHER SPECIFIED POSTPROCEDURAL STATES: Chronic | ICD-10-CM

## 2021-02-16 DIAGNOSIS — Z96.611 PRESENCE OF RIGHT ARTIFICIAL SHOULDER JOINT: Chronic | ICD-10-CM

## 2021-02-16 PROCEDURE — 93017 CV STRESS TEST TRACING ONLY: CPT

## 2021-02-16 PROCEDURE — A9500: CPT

## 2021-02-16 PROCEDURE — 78452 HT MUSCLE IMAGE SPECT MULT: CPT | Mod: 26

## 2021-02-16 PROCEDURE — 78452 HT MUSCLE IMAGE SPECT MULT: CPT

## 2021-02-16 PROCEDURE — 93018 CV STRESS TEST I&R ONLY: CPT

## 2021-02-16 PROCEDURE — 93016 CV STRESS TEST SUPVJ ONLY: CPT

## 2021-02-17 LAB
ALBUMIN SERPL ELPH-MCNC: 4.6 G/DL
ALP BLD-CCNC: 81 U/L
ALT SERPL-CCNC: 35 U/L
ANION GAP SERPL CALC-SCNC: 14 MMOL/L
AST SERPL-CCNC: 25 U/L
BILIRUB SERPL-MCNC: 0.2 MG/DL
BUN SERPL-MCNC: 16 MG/DL
CALCIUM SERPL-MCNC: 9.9 MG/DL
CHLORIDE SERPL-SCNC: 105 MMOL/L
CHOLEST SERPL-MCNC: 190 MG/DL
CO2 SERPL-SCNC: 21 MMOL/L
CREAT SERPL-MCNC: 1.07 MG/DL
GLUCOSE SERPL-MCNC: 125 MG/DL
HDLC SERPL-MCNC: 47 MG/DL
LDLC SERPL CALC-MCNC: 127 MG/DL
NONHDLC SERPL-MCNC: 143 MG/DL
POTASSIUM SERPL-SCNC: 4.6 MMOL/L
PROT SERPL-MCNC: 7.6 G/DL
SODIUM SERPL-SCNC: 140 MMOL/L
TRIGL SERPL-MCNC: 80 MG/DL

## 2021-03-31 ENCOUNTER — APPOINTMENT (OUTPATIENT)
Dept: CARDIOLOGY | Facility: CLINIC | Age: 56
End: 2021-03-31
Payer: COMMERCIAL

## 2021-03-31 ENCOUNTER — NON-APPOINTMENT (OUTPATIENT)
Age: 56
End: 2021-03-31

## 2021-03-31 VITALS
WEIGHT: 210 LBS | SYSTOLIC BLOOD PRESSURE: 132 MMHG | DIASTOLIC BLOOD PRESSURE: 82 MMHG | OXYGEN SATURATION: 96 % | HEIGHT: 65 IN | HEART RATE: 75 BPM | BODY MASS INDEX: 34.99 KG/M2

## 2021-03-31 PROCEDURE — 99214 OFFICE O/P EST MOD 30 MIN: CPT

## 2021-03-31 PROCEDURE — 93000 ELECTROCARDIOGRAM COMPLETE: CPT

## 2021-03-31 PROCEDURE — 99072 ADDL SUPL MATRL&STAF TM PHE: CPT

## 2021-03-31 NOTE — HISTORY OF PRESENT ILLNESS
[FreeTextEntry1] : Scot Aviles is a 55-year-old man with a history of vertigo, chronic back pain, gastroesophageal reflux, osteoarthritis, sarcoidosis (non-cardiac, formerly trated with steroids, "in remission"), pericarditis, former tobacco use (now vapes), remote substance abuse, and obstructive sleep apnea who originally presented for cardiology consultation in February 2021 because of an abnormal electrocardiogram (suggestive of infarct).  He was subtotally referred for noninvasive cardiac testing (see results below) which suggested basal inferior and basal septal infarct (no ischemia present). I subsequently recommended daily aspirin 81 mg and atorvastatin 20 mg daily.  He has been feeling well - excellent exercise performance on recent stress test and good overall functional status.  He denies angina, dyspnea, palpitations. He describes occasional fatigue.

## 2021-03-31 NOTE — PHYSICAL EXAM
[Well Groomed] : well groomed [General Appearance - In No Acute Distress] : no acute distress [Eyelids - No Xanthelasma] : the eyelids demonstrated no xanthelasmas [Respiration, Rhythm And Depth] : normal respiratory rhythm and effort [Auscultation Breath Sounds / Voice Sounds] : lungs were clear to auscultation bilaterally [Heart Rate And Rhythm] : heart rate and rhythm were normal [Heart Sounds] : normal S1 and S2 [Murmurs] : no murmurs present [Nail Clubbing] : no clubbing of the fingernails [Cyanosis, Localized] : no localized cyanosis [Skin Color & Pigmentation] : normal skin color and pigmentation [] : no rash [Affect] : the affect was normal [Oriented To Time, Place, And Person] : oriented to person, place, and time [Mood] : the mood was normal [FreeTextEntry1] : Wearing a face mask [Abnormal Walk] : normal gait

## 2021-03-31 NOTE — REASON FOR VISIT
[Follow-Up - Clinic] : a clinic follow-up of [Abnormal ECG] : an abnormal ECG [Abnormal Test Result] : an abnormal test result [Dizziness] : dizziness [FreeTextEntry1] : near-syncope, MI

## 2021-03-31 NOTE — DISCUSSION/SUMMARY
[___ Month(s)] : [unfilled] month(s) [With Me] : with me [FreeTextEntry1] : \par Old myocardial infarction: Small area of infarct but overall preserved left ventricular systolic function; infarct may have related to remote abuse of cocaine (sober for 30+ years now); asymptomatic with no angina; continue aspirin, atorvastatin; no beta blockade due to normal LV systolic function and complaints of fatigue.\par \par Abnormal ECG: Today's tracing is similar to his last ECG with inferior Q waves.\par \par Elevated blood pressure in the absence of established hypertension: Blood pressure mildly elevated but was lower during a visit with another physician in November 2020.  I recommended a trial of diet, exercise, lifestyle modification; if persistent elevation when he returns to see me later this year we will discuss antihypertensive therapy.

## 2021-03-31 NOTE — REVIEW OF SYSTEMS
[see HPI] : see HPI [Joint Pain] : joint pain [Negative] : Heme/Lymph [Feeling Fatigued] : feeling fatigued [Shortness Of Breath] : no shortness of breath [Dyspnea on exertion] : not dyspnea during exertion [Chest  Pressure] : no chest pressure [Chest Pain] : no chest pain [Palpitations] : no palpitations

## 2021-04-09 ENCOUNTER — RX RENEWAL (OUTPATIENT)
Age: 56
End: 2021-04-09

## 2021-08-03 ENCOUNTER — TRANSCRIPTION ENCOUNTER (OUTPATIENT)
Age: 56
End: 2021-08-03

## 2021-08-10 ENCOUNTER — NON-APPOINTMENT (OUTPATIENT)
Age: 56
End: 2021-08-10

## 2021-08-10 ENCOUNTER — APPOINTMENT (OUTPATIENT)
Dept: CARDIOLOGY | Facility: CLINIC | Age: 56
End: 2021-08-10
Payer: COMMERCIAL

## 2021-08-10 VITALS
WEIGHT: 228 LBS | BODY MASS INDEX: 37.99 KG/M2 | HEIGHT: 65 IN | DIASTOLIC BLOOD PRESSURE: 100 MMHG | SYSTOLIC BLOOD PRESSURE: 142 MMHG | HEART RATE: 97 BPM | OXYGEN SATURATION: 96 %

## 2021-08-10 PROCEDURE — 93000 ELECTROCARDIOGRAM COMPLETE: CPT

## 2021-08-10 PROCEDURE — 99214 OFFICE O/P EST MOD 30 MIN: CPT

## 2021-08-10 NOTE — REASON FOR VISIT
[Follow-Up - Clinic] : a clinic follow-up of [Abnormal ECG] : an abnormal ECG [Abnormal Test Result] : an abnormal test result [Dizziness] : dizziness [Medication Management] : Medication management [FreeTextEntry1] : elevated BP

## 2021-08-10 NOTE — PHYSICAL EXAM
[Well Groomed] : well groomed [General Appearance - In No Acute Distress] : no acute distress [] : no respiratory distress [Respiration, Rhythm And Depth] : normal respiratory rhythm and effort [Auscultation Breath Sounds / Voice Sounds] : lungs were clear to auscultation bilaterally [Heart Rate And Rhythm] : heart rate and rhythm were normal [Heart Sounds] : normal S1 and S2 [Murmurs] : no murmurs present [Abnormal Walk] : normal gait [Nail Clubbing] : no clubbing of the fingernails [Cyanosis, Localized] : no localized cyanosis [Oriented To Time, Place, And Person] : oriented to person, place, and time [Affect] : the affect was normal [Mood] : the mood was normal [FreeTextEntry1] : Wearing a face mask

## 2021-08-10 NOTE — REVIEW OF SYSTEMS
[Weight Gain (___ Lbs)] : [unfilled] ~Ulb weight gain [SOB] : no shortness of breath [Dyspnea on exertion] : not dyspnea during exertion [Chest Discomfort] : no chest discomfort [Palpitations] : no palpitations

## 2021-08-10 NOTE — HISTORY OF PRESENT ILLNESS
[FreeTextEntry1] : Scot Aviles is a 56-year-old man with a history of abnormal electrocardiogram, myocardial infarction (remote), vertigo, chronic back pain, gastroesophageal reflux, osteoarthritis, sarcoidosis (non-cardiac, formerly trated with steroids, "in remission"), pericarditis, former tobacco use (now vapes), remote substance abuse, and obstructive sleep apnea who returns for cardiac examination. He was seen at a local urgent care center earlier this month for COVID-19 testing following a known exposure -- blood pressure was markedly elevated at that time (155/100, 154/107).  He continues to feel well - no new complaints; denies any significant change in diet (says he tries to avoid sodium).

## 2021-08-10 NOTE — DISCUSSION/SUMMARY
[With Me] : with me [___ Month(s)] : in [unfilled] month(s) [FreeTextEntry1] : \par Hypertension: New-onset (although blood pressure was modestly elevated during previous encounter); we discussed lifestyle modifications (diet, exercise, weight loss, low sodium diet) and also pharmacotherapy - I decided to prescribe a beta blocker (carvedilol 6.25 mg twice daily) given the presence of CAD, prior MI, and brief arrhythmia during exercise stress testing.\par \par Old myocardial infarction: Stable and without ischemia or anginal symptoms; continue aspirin.\par \par

## 2021-08-25 ENCOUNTER — TRANSCRIPTION ENCOUNTER (OUTPATIENT)
Age: 56
End: 2021-08-25

## 2021-09-10 ENCOUNTER — TRANSCRIPTION ENCOUNTER (OUTPATIENT)
Age: 56
End: 2021-09-10

## 2021-09-28 ENCOUNTER — APPOINTMENT (OUTPATIENT)
Dept: CARDIOLOGY | Facility: CLINIC | Age: 56
End: 2021-09-28
Payer: COMMERCIAL

## 2021-09-28 ENCOUNTER — NON-APPOINTMENT (OUTPATIENT)
Age: 56
End: 2021-09-28

## 2021-09-28 VITALS
SYSTOLIC BLOOD PRESSURE: 140 MMHG | HEIGHT: 65 IN | WEIGHT: 225 LBS | BODY MASS INDEX: 37.49 KG/M2 | DIASTOLIC BLOOD PRESSURE: 92 MMHG | OXYGEN SATURATION: 95 % | HEART RATE: 91 BPM

## 2021-09-28 VITALS — SYSTOLIC BLOOD PRESSURE: 130 MMHG | DIASTOLIC BLOOD PRESSURE: 86 MMHG

## 2021-09-28 DIAGNOSIS — R03.0 ELEVATED BLOOD-PRESSURE READING, W/OUT DIAGNOSIS OF HYPERTENSION: ICD-10-CM

## 2021-09-28 PROCEDURE — 99214 OFFICE O/P EST MOD 30 MIN: CPT

## 2021-09-28 PROCEDURE — 93000 ELECTROCARDIOGRAM COMPLETE: CPT

## 2021-09-28 NOTE — HISTORY OF PRESENT ILLNESS
[FreeTextEntry1] : Scot Aviles is a 56-year-old man with a history of abnormal electrocardiogram, myocardial infarction (remote), vertigo, chronic back pain, gastroesophageal reflux, osteoarthritis, sarcoidosis (non-cardiac, formerly treated with steroids, "in remission"), pericarditis, former tobacco use (now vapes), remote substance abuse, and obstructive sleep apnea who returns for cardiac examination. During our last encounter in August his blood pressure was poorly controlled and I prescribed carvedilol 6.25 mg twice daily.  He has been tolerating therapy - - blood pressure had improved during a recent urgent care visit for non-COVID viral illness.  He offers no new complaints during today's encounter.

## 2021-09-28 NOTE — DISCUSSION/SUMMARY
[With Me] : with me [___ Month(s)] : in [unfilled] month(s) [FreeTextEntry1] : Hypertension: Blood pressure has improved with carvedilol -- may need to up titrate dose but he has made significant lifestyle changes (and weight has decreased); I plan to reevaluate blood pressure in approximately 3 months and increase the dose of carvedilol if persistent elevation.\par \par Coronary artery disease / old myocardial infarction:  Patient requests a refill of his atorvastatin -- he has been tolerating therapy; refill authorization sent to his local pharmacy.

## 2021-09-28 NOTE — REVIEW OF SYSTEMS
[Headache] : no headache [Weight Loss (___ Lbs)] : [unfilled] ~Ulb weight loss [SOB] : no shortness of breath [Dyspnea on exertion] : not dyspnea during exertion [Chest Discomfort] : no chest discomfort [Palpitations] : no palpitations [Cough] : cough

## 2021-09-28 NOTE — PHYSICAL EXAM
[Well Groomed] : well groomed [General Appearance - In No Acute Distress] : no acute distress [] : no respiratory distress [Respiration, Rhythm And Depth] : normal respiratory rhythm and effort [Auscultation Breath Sounds / Voice Sounds] : lungs were clear to auscultation bilaterally [Heart Rate And Rhythm] : heart rate and rhythm were normal [Heart Sounds] : normal S1 and S2 [Murmurs] : no murmurs present [Oriented To Time, Place, And Person] : oriented to person, place, and time [Affect] : the affect was normal [Mood] : the mood was normal [FreeTextEntry1] : Wearing a face mask

## 2021-09-28 NOTE — REASON FOR VISIT
[Follow-Up - Clinic] : a clinic follow-up of [Abnormal ECG] : an abnormal ECG [Abnormal Test Result] : an abnormal test result [Dizziness] : dizziness [Hypertension] : hypertension [Medication Management] : Medication management

## 2021-12-01 ENCOUNTER — RESULT REVIEW (OUTPATIENT)
Age: 56
End: 2021-12-01

## 2021-12-28 ENCOUNTER — NON-APPOINTMENT (OUTPATIENT)
Age: 56
End: 2021-12-28

## 2021-12-28 ENCOUNTER — APPOINTMENT (OUTPATIENT)
Dept: CARDIOLOGY | Facility: CLINIC | Age: 56
End: 2021-12-28
Payer: COMMERCIAL

## 2021-12-28 VITALS
OXYGEN SATURATION: 97 % | BODY MASS INDEX: 37.99 KG/M2 | HEART RATE: 86 BPM | SYSTOLIC BLOOD PRESSURE: 142 MMHG | HEIGHT: 65 IN | DIASTOLIC BLOOD PRESSURE: 90 MMHG | WEIGHT: 228 LBS

## 2021-12-28 VITALS — SYSTOLIC BLOOD PRESSURE: 126 MMHG | DIASTOLIC BLOOD PRESSURE: 80 MMHG

## 2021-12-28 VITALS — DIASTOLIC BLOOD PRESSURE: 84 MMHG | SYSTOLIC BLOOD PRESSURE: 128 MMHG

## 2021-12-28 PROCEDURE — 93000 ELECTROCARDIOGRAM COMPLETE: CPT

## 2021-12-28 PROCEDURE — 99214 OFFICE O/P EST MOD 30 MIN: CPT

## 2021-12-28 NOTE — HISTORY OF PRESENT ILLNESS
[FreeTextEntry1] : Scot Aviles is a 56-year-old man with a history of vertigo, chronic back pain, gastroesophageal reflux, osteoarthritis, sarcoidosis (non-cardiac, previously treated with steroids, "in remission"), former tobacco use (now vapes), and obstructive sleep apnea who originally presented for cardiology consultation in February 2021 because of an abnormal electrocardiogram (suggestive of infarct).  Noninvasive cardiac testing (see results below) suggested basal inferior and basal septal infarct (no ischemia present). I subsequently recommended daily aspirin 81 mg and atorvastatin 20 mg daily.  I prescribed him carvedilol for suboptimally controlled blood pressure earlier this year.  He has been tolerating therapy and feels well.  He describes sporadic and non anginal chest discomfort (described as an "electric shock" sensation); no exertional symptoms.

## 2021-12-28 NOTE — PHYSICAL EXAM
[Well Groomed] : well groomed [General Appearance - In No Acute Distress] : no acute distress [] : no respiratory distress [Respiration, Rhythm And Depth] : normal respiratory rhythm and effort [Auscultation Breath Sounds / Voice Sounds] : lungs were clear to auscultation bilaterally [Heart Rate And Rhythm] : heart rate and rhythm were normal [Murmurs] : no murmurs present [Heart Sounds] : normal S1 and S2 [Oriented To Time, Place, And Person] : oriented to person, place, and time [Affect] : the affect was normal [Mood] : the mood was normal [FreeTextEntry1] : Wearing a face mask

## 2021-12-28 NOTE — REVIEW OF SYSTEMS
[Headache] : no headache [SOB] : no shortness of breath [Dyspnea on exertion] : not dyspnea during exertion [Chest Discomfort] : no chest discomfort [Palpitations] : no palpitations [FreeTextEntry5] : SEE HPI

## 2021-12-28 NOTE — DISCUSSION/SUMMARY
[With Me] : with me [___ Month(s)] : in [unfilled] month(s) [FreeTextEntry1] : \par Hypertension: Fair control; continue carvedilol; we discussed diet, exercise, and weight loss -- had been doing well but recent dietary indiscretion related to holidays.\par \par Coronary artery disease / old myocardial infarction:  Stable and without anginal symptoms; continue aspirin, carvedilol, and atorvastatin.\par \par Abnormal ECG: Today's ECG is similar to previous tracings with inferior Q waves consistent with prior infarct.\par

## 2021-12-28 NOTE — CARDIOLOGY SUMMARY
[___] : [unfilled] [de-identified] : 12/28/21.  Sinus  Rhythm with 1st degree AV block. Old inferior infarct.  Poor R-wave progression  [de-identified] : 2/16/21\par Completed 9 minutes 55 seconds of the Petr protocol achieving 11 METS.  Stress ECG revealed approximately 0.5 mm horizontal ST depressions in the inferior leads. There was a single 3 beat run of wide complex tachycardia and a ventricular couplet during stress.  There are small, mild to moderate defect in the basal inferior and basal septal walls that are fixed suggestive of scar. LVEF was estimated at 70% with reduced systolic thickening of the basal inferior and basal septal walls. [de-identified] : 2/13/21\par Normal to hyperdynamic left ventricular systolic function. Mild diastolic dysfunction.  Mild concentric LVH. Normal right ventricular size and function.

## 2022-04-30 PROBLEM — M19.011 PRIMARY OSTEOARTHRITIS OF BOTH SHOULDERS: Status: ACTIVE | Noted: 2019-02-01

## 2022-04-30 PROBLEM — Z96.612 STATUS POST TOTAL REPLACEMENT OF LEFT SHOULDER: Status: ACTIVE | Noted: 2019-08-06

## 2022-04-30 PROBLEM — Z96.611 STATUS POST TOTAL REPLACEMENT OF RIGHT SHOULDER: Status: ACTIVE | Noted: 2019-08-06

## 2022-05-03 ENCOUNTER — APPOINTMENT (OUTPATIENT)
Dept: ORTHOPEDIC SURGERY | Facility: CLINIC | Age: 57
End: 2022-05-03
Payer: COMMERCIAL

## 2022-05-03 VITALS — BODY MASS INDEX: 34.99 KG/M2 | WEIGHT: 210 LBS | HEIGHT: 65 IN

## 2022-05-03 DIAGNOSIS — M19.012 PRIMARY OSTEOARTHRITIS, RIGHT SHOULDER: ICD-10-CM

## 2022-05-03 DIAGNOSIS — M19.011 PRIMARY OSTEOARTHRITIS, RIGHT SHOULDER: ICD-10-CM

## 2022-05-03 DIAGNOSIS — Z96.612 PRESENCE OF LEFT ARTIFICIAL SHOULDER JOINT: ICD-10-CM

## 2022-05-03 DIAGNOSIS — Z96.611 PRESENCE OF RIGHT ARTIFICIAL SHOULDER JOINT: ICD-10-CM

## 2022-05-03 PROCEDURE — 99213 OFFICE O/P EST LOW 20 MIN: CPT

## 2022-05-03 PROCEDURE — 73030 X-RAY EXAM OF SHOULDER: CPT | Mod: 50

## 2022-05-03 NOTE — DISCUSSION/SUMMARY
[de-identified] : The underlying pathophysiology was reviewed in great detail with the patient as well as the various treatment options, including ice, analgesics, NSAIDs, Physical therapy, steroid injections. \par \par Patient s/p Right total shoulder arthroplasty on 3/11/2019, Left total shoulder arthroplasty on 7/24/2019 stable and doing well.\par \par A prescription for Physical Therapy was provided.\par \par Activity modifications and restrictions were discussed. I advised avoiding overhead lifting. I advised the patient to work on good posture. \par \par Patient may follow up if their symptoms worsen in the future. \par \par All questions were answered, all alternatives discussed and the patient is in complete agreement with that plan. Follow-up appointment as instructed. Any issues and the patient will call or come in sooner.

## 2022-05-03 NOTE — PHYSICAL EXAM
[Normal RUE] : Right Upper Extremity: No scars, rashes, lesions, ulcers, skin intact [Normal LUE] : Left Upper Extremity: No scars, rashes, lesions, ulcers, skin intact [Normal Touch] : sensation intact for touch [Normal] : No swelling, no edema, normal pedal pulses and normal temperature [Obese] : obese [Poor Appearance] : well-appearing [Acute Distress] : not in acute distress [de-identified] : Right Upper Extremity\par o Shoulder :\par ¦ Inspection/Palpation :  no tenderness to palpation, no swelling, no deformities, well healed surgical scar\par ¦ Range of Motion : ACTIVE FORWARD ELEVATION: Measured at 160 degrees, ACTIVE EXTERNAL ROTATION: Measured at 40 degrees, ACTIVE INTERNAL ROTATION: Measured at T12\par ¦ Strength : external rotation 5/5, internal rotation 5/5, supraspinatus 5/5\par ¦ Stability : no joint instability on provocative testing\par ¦ Tests/Signs : Neer (-), Renner (-), Belly Press Test (-) \par o Upper Arm : no tenderness, no swelling, no deformities\par o Muscle Bulk : no atrophy\par o Sensation : sensation intact to light touch\par o Skin : no skin rash or discoloration\par o Vascular Exam : no edema, no cyanosis, radial and ulnar pulses normal \par \par Left Upper Extremity\par o Shoulder :\par ¦ Inspection/Palpation : no tenderness,  no swelling, no deformities, well healed surgical scar\par ¦ Range of Motion : ACTIVE FORWARD ELEVATION: Measured at 165 degrees, ACTIVE EXTERNAL ROTATION: Measured at 35 degrees, ACTIVE INTERNAL ROTATION: Measured at T12\par ¦ Strength : external rotation 5/5, internal rotation 5/5, supraspinatus 5/5\par ¦ Stability : no joint instability on provocative testing\par ¦ Tests/Signs : Neer (-), Renner (-), Belly Press Test (-) \par o Upper Arm : no tenderness, no swelling, no deformities\par o Muscle Bulk : no atrophy\par o Sensation : sensation intact to light touch\par o Skin : no skin rash or discoloration\par o Vascular Exam : no edema, no cyanosis, radial and ulnar pulses normal  [de-identified] : o Right Shoulder : Grashey, Axillary and Outlet views were obtained, there are no soft tissue abnormalities, no fractures, alignment is normal, normal bone density, no bony lesions, s/p Right TSA with hardware in place and no signs of loosening.\par \par o Left Shoulder : Grashey, Axillary and Outlet views were obtained, there are no soft tissue abnormalities, no fractures, alignment is normal, normal bone density, no bony lesions, s/p Left TSA with hardware in place and no signs of loosening. \par \par

## 2022-05-03 NOTE — REASON FOR VISIT
[Follow-Up Visit] : a follow-up visit for [Shoulder Pain] : shoulder pain [FreeTextEntry2] : Bilateral Shoulder Pain

## 2022-05-03 NOTE — END OF VISIT
[FreeTextEntry3] : All medical record entries made by the Scribe were at my, Dr. Alec Castaneda, direction and personally dictated by me on 05/03/2022. I have reviewed the chart and agree that the record accurately reflects my personal performance of the history, physical exam, assessment and plan. I have also personally directed, reviewed, and agreed with the chart.

## 2022-05-03 NOTE — HISTORY OF PRESENT ILLNESS
[de-identified] : Patient is a 55 year-old male who presents for re-evaluation of bilateral shoulder pain. He is s/p Right total shoulder arthroplasty on 3/11/2019 and s/p Left total shoulder arthroplasty on 7/24/2019. . He is interested in returning to Physcial therapy at this time. He is presenting for the first time since July 2020. He has no other complaints at this time.\par He states that overall he is doing very well but has some occasional pains in the left upper arm.

## 2022-05-03 NOTE — ADDENDUM
[FreeTextEntry1] : I, Erlin Cruz, acted solely as a scribe for Dr. Alec Castaneda on this date 05/03/2022.

## 2022-05-18 ENCOUNTER — APPOINTMENT (OUTPATIENT)
Dept: ORTHOPEDIC SURGERY | Facility: CLINIC | Age: 57
End: 2022-05-18
Payer: COMMERCIAL

## 2022-05-18 VITALS — WEIGHT: 208 LBS | BODY MASS INDEX: 34.66 KG/M2 | HEIGHT: 65 IN

## 2022-05-18 PROCEDURE — 73564 X-RAY EXAM KNEE 4 OR MORE: CPT | Mod: 50

## 2022-05-18 PROCEDURE — 99203 OFFICE O/P NEW LOW 30 MIN: CPT

## 2022-05-18 NOTE — HISTORY OF PRESENT ILLNESS
[de-identified] : 5/18/22: Mr. Scot Aviles, a 56-year-old male c/o B/L knee pain. Chronic for years. Pain has recently increased. c/o constant pain that gets better with rest and instability/buckling. Hx of right knee ACL recon and meniscus repair. No treatment to the left. [facility] at [facility].

## 2022-05-18 NOTE — DISCUSSION/SUMMARY
[de-identified] : 56m with b/l knee djd, chronic pain, history of right knee ACL recon. has failed conservative treatment including csi. Request auth for series of visco injections for b/l knees\par 1) cryotherapy, rest and activity modification\par 2) rtc with auth for injections.

## 2022-05-18 NOTE — PHYSICAL EXAM
[Right] : right knee [Left] : left knee [NL (0)] : extension 0 degrees [] : no extensor lag [[id number]] : flexion 135 degrees

## 2022-06-07 ENCOUNTER — NON-APPOINTMENT (OUTPATIENT)
Age: 57
End: 2022-06-07

## 2022-06-28 ENCOUNTER — NON-APPOINTMENT (OUTPATIENT)
Age: 57
End: 2022-06-28

## 2022-06-28 ENCOUNTER — APPOINTMENT (OUTPATIENT)
Dept: CARDIOLOGY | Facility: CLINIC | Age: 57
End: 2022-06-28
Payer: COMMERCIAL

## 2022-06-28 VITALS
OXYGEN SATURATION: 95 % | BODY MASS INDEX: 37.65 KG/M2 | HEIGHT: 65 IN | DIASTOLIC BLOOD PRESSURE: 86 MMHG | SYSTOLIC BLOOD PRESSURE: 146 MMHG | WEIGHT: 226 LBS | HEART RATE: 82 BPM

## 2022-06-28 VITALS — DIASTOLIC BLOOD PRESSURE: 82 MMHG | SYSTOLIC BLOOD PRESSURE: 126 MMHG

## 2022-06-28 PROCEDURE — 93000 ELECTROCARDIOGRAM COMPLETE: CPT

## 2022-06-28 PROCEDURE — 99214 OFFICE O/P EST MOD 30 MIN: CPT

## 2022-06-28 RX ORDER — CARVEDILOL 6.25 MG/1
6.25 TABLET, FILM COATED ORAL
Qty: 60 | Refills: 3 | Status: DISCONTINUED | COMMUNITY
Start: 2021-08-10 | End: 2022-06-28

## 2022-06-28 NOTE — CARDIOLOGY SUMMARY
[de-identified] : 6/28/22.  Sinus  Rhythm with 1st degree AV block, Septal infarct, Cannot exclude old inferior infarct  [de-identified] : 2/16/21\par Completed 9 minutes 55 seconds of the Petr protocol achieving 11 METS.  Stress ECG revealed approximately 0.5 mm horizontal ST depressions in the inferior leads. There was a single 3 beat run of wide complex tachycardia and a ventricular couplet during stress.  There are small, mild to moderate defect in the basal inferior and basal septal walls that are fixed suggestive of scar. LVEF was estimated at 70% with reduced systolic thickening of the basal inferior and basal septal walls. [de-identified] : 2/13/21\par Normal to hyperdynamic left ventricular systolic function. Mild diastolic dysfunction.  Mild concentric LVH. Normal right ventricular size and function.

## 2022-06-28 NOTE — PHYSICAL EXAM
[No Acute Distress] : no acute distress [Obese] : obese [Normal S1, S2] : normal S1, S2 [Clear Lung Fields] : clear lung fields [No Edema] : no edema [Moves all extremities] : moves all extremities [Alert and Oriented] : alert and oriented

## 2022-06-28 NOTE — REVIEW OF SYSTEMS
[Headache] : no headache [Weight Gain (___ Lbs)] : [unfilled] ~Ulb weight gain [SOB] : no shortness of breath [Dyspnea on exertion] : not dyspnea during exertion [Chest Discomfort] : no chest discomfort [Palpitations] : no palpitations [FreeTextEntry5] : Occasional "electric shock" sensation

## 2022-06-28 NOTE — DISCUSSION/SUMMARY
[With Me] : with me [___ Month(s)] : in [unfilled] month(s) [FreeTextEntry1] : \par Hypertension: Fair control; resume carvedilol.\par \par Coronary artery disease / old myocardial infarction:  Stable; no angina (good baseline functional capacity); continue aspirin, carvedilol, and atorvastatin.\par \par Abnormal ECG: Today's ECG is similar to previous tracings with inferior Q waves consistent with prior infarct.\par

## 2022-06-28 NOTE — HISTORY OF PRESENT ILLNESS
[FreeTextEntry1] : Scot Aviles is a 56-year-old man with a history of abnormal ECG, hypertension, nuclear SPECT perfusion scan suggestive of basal inferior and basal septal infarct, vertigo, chronic back pain, gastroesophageal reflux, osteoarthritis, sarcoidosis (non-cardiac, "in remission"), former tobacco use (now vapes), and obstructive sleep apnea who returns for cardiac examination–our last encounter was in December 2021.  He describes mild COVID-19 infection in January but otherwise has been well; no angina, palpitations, or syncope.

## 2022-07-11 NOTE — CARDIOLOGY SUMMARY
Identified Patient with two Patient identifiers (Name and ). Two Patient Identifiers confirmed. Reviewed record in preparation for visit and have obtained necessary documentation. Chief Complaint Patient presents with  Hypertension  
  follow up and medication refill Visit Vitals /75 (BP 1 Location: Left arm, BP Patient Position: Sitting) Pulse 68 Temp 99 °F (37.2 °C) (Oral) Resp 18 Ht 6' 3\" (1.905 m) Wt 266 lb (120.7 kg) SpO2 98% BMI 33.25 kg/m² 1. Have you been to the ER, urgent care clinic since your last visit? Hospitalized since your last visit? No 
 
2. Have you seen or consulted any other health care providers outside of the [address] [place] [address] since your last visit? Include any pap smears or colon screening.  No 
 
 
 LVM with appointment date and time   [___] : [unfilled] [___] : [unfilled]

## 2022-08-26 ENCOUNTER — APPOINTMENT (OUTPATIENT)
Dept: CARDIOLOGY | Facility: CLINIC | Age: 57
End: 2022-08-26

## 2022-10-14 NOTE — H&P PST ADULT - PSYCHIATRIC
Condition:: rash Please Describe Your Condition:: Rash began to appear on her left breast two weeks ago, and more spots appeared spread across chest three days ago. She is not experiencing pain or itchiness. \\n\\nShe has been putting OTC cortisone cream and is not sure if it has been helping. details… Affect and characteristics of appearance, verbalizations, behaviors are appropriate Patient

## 2022-10-28 ENCOUNTER — FORM ENCOUNTER (OUTPATIENT)
Age: 57
End: 2022-10-28

## 2022-10-28 ENCOUNTER — APPOINTMENT (OUTPATIENT)
Dept: ORTHOPEDIC SURGERY | Facility: CLINIC | Age: 57
End: 2022-10-28

## 2022-10-28 VITALS — WEIGHT: 218 LBS | HEIGHT: 65 IN | BODY MASS INDEX: 36.32 KG/M2

## 2022-10-28 DIAGNOSIS — M20.22 HALLUX RIGIDUS, LEFT FOOT: ICD-10-CM

## 2022-10-28 PROCEDURE — 73630 X-RAY EXAM OF FOOT: CPT | Mod: 50

## 2022-10-28 PROCEDURE — 99204 OFFICE O/P NEW MOD 45 MIN: CPT | Mod: 57

## 2022-10-28 NOTE — PHYSICAL EXAM
[Bilateral] : foot and ankle bilaterally [Mild] : mild swelling of MTP joint/great toe [1st] : 1st [NL (40)] : plantar flexion 40 degrees [NL 30)] : inversion 30 degrees [NL (20)] : eversion 20 degrees [5___] : plantar flexion 5[unfilled]/5 [2+] : dorsalis pedis pulse: 2+ [] : no achilles tendon insertion tenderness

## 2022-10-28 NOTE — IMAGING
[Bilateral] : foot bilaterally [Weight -] : weightbearing [Degenerative change] : Degenerative change [de-identified] : 4 vies  accessory navicula, hallux mtp djd

## 2022-10-28 NOTE — DISCUSSION/SUMMARY
[Surgical risks reviewed] : Surgical risks reviewed [de-identified] : The risks and benefits of surgery have been discussed. Risks include but are not limited to bleeding, infection, reaction to anesthesia, injury to blood vessels and nerves, malunion, nonunion, necessity of repeat surgery, chronic pain, loss of limb and death. The patient understands the risks and agrees with the surgical plan. Details of the procedure and postoperative protocol were discussed at length. All questions have been answered.\par \par Discussed cheilectomy and prox phalanx osteotomy for the left foot. He would like to proceed.\par \par Would also get MRI B/L ankles to evaluate post tib tendon for tearing\par WBAT in arch support suggested

## 2022-10-28 NOTE — HISTORY OF PRESENT ILLNESS
[10] : 10 [Localized] : localized [Sharp] : sharp [Full time] : Work status: full time [de-identified] : 10/28/22: Bilateral foot pain left >right ongoing since 2014. Pain also into the big toes with stiffness. Pain medially into the foot. Reports difficulty getting out of bed in the morning. Has seen podiatry in the past where CSI done to both feet with transient relief. Tried shoe modifications. No n/t. No prior injuries to the feet.  [] : Post Surgical Visit: no [FreeTextEntry1] : B/L Feet  [FreeTextEntry3] : N/A Chronic [FreeTextEntry5] : 58 Y/O RHD M eval CHARLIE feet Pt presents with atraumatic pain that is chronic due to OA and excessive  navicular bones B.L  prior TX of CSI not recent  [de-identified] : None [de-identified] : Head  [facility]

## 2022-10-29 ENCOUNTER — APPOINTMENT (OUTPATIENT)
Dept: MRI IMAGING | Facility: CLINIC | Age: 57
End: 2022-10-29

## 2022-10-29 PROCEDURE — 73721 MRI JNT OF LWR EXTRE W/O DYE: CPT | Mod: LT

## 2022-10-29 PROCEDURE — 73721 MRI JNT OF LWR EXTRE W/O DYE: CPT | Mod: RT

## 2022-11-02 ENCOUNTER — APPOINTMENT (OUTPATIENT)
Dept: ORTHOPEDIC SURGERY | Facility: CLINIC | Age: 57
End: 2022-11-02

## 2022-11-02 VITALS — HEIGHT: 65 IN | BODY MASS INDEX: 36.32 KG/M2 | WEIGHT: 218 LBS

## 2022-11-02 PROCEDURE — 99213 OFFICE O/P EST LOW 20 MIN: CPT | Mod: 25

## 2022-11-02 PROCEDURE — J3490M: CUSTOM

## 2022-11-02 PROCEDURE — 20611 DRAIN/INJ JOINT/BURSA W/US: CPT | Mod: 50

## 2022-11-03 NOTE — PROCEDURE
[FreeTextEntry3] : Large joint injection was performed of the b/l knees. An injection of Lidocaine 3cc of 1% , Bupivacaine (Marcaine) 6cc of 0.5% , Triamcinolone (Kenalog) 2cc of 40 mg  was used. \par Patient was advised to call if redness, pain or fever occur and apply ice for 15 minutes out of every hour for the next 12-24 hours as tolerated. \par \par Patient has tried OTC's including aspirin, Ibuprofen, Aleve, etc or prescription NSAIDS, and/or exercises at home and/or physical therapy without satisfactory response, patient had decreased mobility in the joint and the risks benefits, and alternatives have been discussed, and verbal consent was obtained. \par The site was prepped with alcohol, betadine and ethyl chloride sprayed topically\par \par The risks, benefits and contents of the injection have been discussed.  Risks include but are not limited to allergic reaction, flare reaction, permanent white skin discoloration at the injection site and infection.  The patient understands the risks and agrees to having the injection.  All questions have been answered.\par \par Ultrasound guidance was indicated for this patient due to prior failure or difficult injection. All ultrasound images have been permanently captured and stored accordingly in our picture archiving and communication system.\par

## 2022-11-03 NOTE — DISCUSSION/SUMMARY
[de-identified] : 56m with b/l knee djd, history of right knee ACL recon. \par 1) csi b/l knees today - tolerated well\par 2) start physical therapy\par 3) cryotherapy, rest and activity modification\par 4) rtc 4- 6 weeks\par \par Entered by Marilin Weeks acting as scribe.\par

## 2022-11-03 NOTE — HISTORY OF PRESENT ILLNESS
[8] : 8 [Burning] : burning [Dull/Aching] : dull/aching [Sharp] : sharp [Shooting] : shooting [Throbbing] : throbbing [de-identified] : 11/2/22: here to f/up b/l knees. Reports continued b/l knee pain and stiffness. \par 5/18/22: Mr. Scot Aviles, a 56-year-old male c/o B/L knee pain. Chronic for years. Pain has recently increased. c/o constant pain that gets better with rest and instability/buckling. Hx of right knee ACL recon and meniscus repair. No treatment to the left. Jay at [facility], [place].  [FreeTextEntry1] : bilateral knees  [de-identified] : none

## 2022-11-09 ENCOUNTER — APPOINTMENT (OUTPATIENT)
Dept: ORTHOPEDIC SURGERY | Facility: CLINIC | Age: 57
End: 2022-11-09

## 2022-11-09 VITALS — BODY MASS INDEX: 36.32 KG/M2 | HEIGHT: 65 IN | WEIGHT: 218 LBS

## 2022-11-09 PROCEDURE — 99214 OFFICE O/P EST MOD 30 MIN: CPT | Mod: 57

## 2022-11-09 NOTE — DISCUSSION/SUMMARY
[Surgical risks reviewed] : Surgical risks reviewed [de-identified] : The risks and benefits of surgery have been discussed. Risks include but are not limited to bleeding, infection, reaction to anesthesia, injury to blood vessels and nerves, malunion, nonunion, necessity of repeat surgery, chronic pain, loss of limb and death. The patient understands the risks and agrees with the surgical plan. Details of the procedure and postoperative protocol were discussed at length. All questions have been answered.\par \par Pt would prefer to stage L jacobyner, possible tendon transter

## 2022-11-09 NOTE — DATA REVIEWED
[MRI] : MRI [Right] : of the right [Ankle] : ankle [FreeTextEntry1] : Impression:  1. Moderate posterior tibial tendinopathy and tenosynovitis distally where there are moderate-to-severe  inflammatory changes with marrow edema at the synchondrosis with a type 2 accessory navicular bone without  tendon discontinuity. 2. Sinus tarsi synovitis, large subcortical cysts in the superior central body of the calcaneus, and mild sprains of  the anterior talofibular, calcaneofibular and deltoid ligaments. 3. Mild partial tearing of the peroneal brevis tendon with moderate tenosynovitis. 4. Mild peroneal longus tenosynovitis and slight anterior tibialis tendinopathy. 5. Mild tibiotalar effusion and synovitis. [FreeTextEntry2] : Impression: \par 1. Mild posterior tibial tendinopathy and tenosynovitis with mild inflammatory change at the synchondrosis \par with a type 2 accessory navicular bone without tendon discontinuity or fracture.\par 2. Mild partial tearing with partial splitting of the peroneal brevis tendon with moderate tenosynovitis.\par 3. Peroneal longus tenosynovitis and anterior tibialis tendinopathy.\par 4. No acute fracture, osteochondral lesion, loose body or malalignment.

## 2022-11-09 NOTE — HISTORY OF PRESENT ILLNESS
[Gradual] : gradual [10] : 10 [9] : 9 [Localized] : localized [Sharp] : sharp [Nothing helps with pain getting better] : Nothing helps with pain getting better [Walking] : walking [Full time] : Work status: full time [de-identified] : 10/28/22: Bilateral foot pain left >right ongoing since 2014. Pain also into the big toes with stiffness. Pain medially into the foot. Reports difficulty getting out of bed in the morning. Has seen podiatry in the past where CSI done to both feet with transient relief. Tried shoe modifications. No n/t. No prior injuries to the feet. \par 11/9/22 f/u rodolfo L > R midfoot pain [] : Post Surgical Visit: no [FreeTextEntry1] : B/L Feet  [de-identified] : None [de-identified] : Head  [facility]

## 2022-11-22 ENCOUNTER — APPOINTMENT (OUTPATIENT)
Dept: CARDIOLOGY | Facility: CLINIC | Age: 57
End: 2022-11-22

## 2022-11-22 VITALS
WEIGHT: 220 LBS | RESPIRATION RATE: 14 BRPM | HEIGHT: 65 IN | SYSTOLIC BLOOD PRESSURE: 132 MMHG | HEART RATE: 80 BPM | BODY MASS INDEX: 36.65 KG/M2 | DIASTOLIC BLOOD PRESSURE: 70 MMHG | OXYGEN SATURATION: 99 %

## 2022-11-22 VITALS
DIASTOLIC BLOOD PRESSURE: 70 MMHG | BODY MASS INDEX: 36.65 KG/M2 | WEIGHT: 220 LBS | SYSTOLIC BLOOD PRESSURE: 132 MMHG | HEART RATE: 80 BPM | OXYGEN SATURATION: 99 % | HEIGHT: 65 IN

## 2022-11-22 PROCEDURE — 99214 OFFICE O/P EST MOD 30 MIN: CPT

## 2022-11-22 NOTE — CARDIOLOGY SUMMARY
[de-identified] : 6/28/22.  Sinus  Rhythm with 1st degree AV block, Septal infarct, Cannot exclude old inferior infarct  [de-identified] : 2/16/21\par Completed 9 minutes 55 seconds of the Petr protocol achieving 11 METS.  Stress ECG revealed approximately 0.5 mm horizontal ST depressions in the inferior leads. There was a single 3 beat run of wide complex tachycardia and a ventricular couplet during stress.  There are small, mild to moderate defect in the basal inferior and basal septal walls that are fixed suggestive of scar. LVEF was estimated at 70% with reduced systolic thickening of the basal inferior and basal septal walls. [de-identified] : 2/13/21\par Normal to hyperdynamic left ventricular systolic function. Mild diastolic dysfunction.  Mild concentric LVH. Normal right ventricular size and function.

## 2022-11-22 NOTE — REASON FOR VISIT
[Arrhythmia/ECG Abnorrmalities] : arrhythmia/ECG abnormalities [Hypertension] : hypertension [Coronary Artery Disease] : coronary artery disease [Other: ____] : [unfilled]

## 2022-11-22 NOTE — HISTORY OF PRESENT ILLNESS
[FreeTextEntry1] : Scot Aviles is a 57-year-old man with a history of abnormal ECG, hypertension, nuclear SPECT perfusion scan suggestive of basal inferior and basal septal infarct, vertigo, chronic back pain, gastroesophageal reflux, osteoarthritis, sarcoidosis (non-cardiac, "in remission"), former tobacco use, and obstructive sleep apnea who returns for cardiac examination prior to foot surgery.  He has been well from a cardiovascular standpoint since I saw him in June.  He describes a persistently good baseline functional status but increasing pain in his  feet;  no angina, dyspnea, palpitations, or syncope.\par

## 2022-11-22 NOTE — DISCUSSION/SUMMARY
[With Me] : with me [___ Month(s)] : in [unfilled] month(s) [FreeTextEntry1] : \par Preoperative cardiac examination: Mr. Aviles is optimized from a cardiac standpoint for foot surgery; I recommend continuing perioperative beta-blockade with carvedilol and ideally the uninterrupted use of aspirin (if acceptable from a surgical standpoint).\par \par Hypertension: Satisfactory control; carvedilol.\par \par Coronary artery disease / old myocardial infarction:  Stable and without angina; continue aspirin, carvedilol, and atorvastatin.\par \par Abnormal ECG: Chronically abnormal ECG with inferior Q waves and poor precordial R wave progression\par

## 2022-11-22 NOTE — REVIEW OF SYSTEMS
[Joint Pain] : joint pain [Dyspnea on exertion] : not dyspnea during exertion [Chest Discomfort] : no chest discomfort [Palpitations] : no palpitations [FreeTextEntry9] : Foot pain

## 2022-11-22 NOTE — PHYSICAL EXAM
[No Acute Distress] : no acute distress [Obese] : obese [Normal S1, S2] : normal S1, S2 [Clear Lung Fields] : clear lung fields [No Edema] : no edema [Alert and Oriented] : alert and oriented [Normal Speech] : normal speech [de-identified] :   Extraocular muscles intact [de-identified] :  wearing a facemask [de-identified] :  no JVD

## 2022-11-29 ENCOUNTER — OUTPATIENT (OUTPATIENT)
Dept: OUTPATIENT SERVICES | Facility: HOSPITAL | Age: 57
LOS: 1 days | End: 2022-11-29
Payer: COMMERCIAL

## 2022-11-29 DIAGNOSIS — Z11.52 ENCOUNTER FOR SCREENING FOR COVID-19: ICD-10-CM

## 2022-11-29 DIAGNOSIS — Z98.890 OTHER SPECIFIED POSTPROCEDURAL STATES: Chronic | ICD-10-CM

## 2022-11-29 DIAGNOSIS — Z96.611 PRESENCE OF RIGHT ARTIFICIAL SHOULDER JOINT: Chronic | ICD-10-CM

## 2022-11-29 DIAGNOSIS — M54.5 LOW BACK PAIN: Chronic | ICD-10-CM

## 2022-11-29 PROCEDURE — C9803: CPT

## 2022-11-29 PROCEDURE — U0005: CPT

## 2022-11-29 PROCEDURE — U0003: CPT

## 2022-11-30 ENCOUNTER — APPOINTMENT (OUTPATIENT)
Dept: ORTHOPEDIC SURGERY | Facility: CLINIC | Age: 57
End: 2022-11-30

## 2022-11-30 VITALS — WEIGHT: 220 LBS | HEIGHT: 65 IN | BODY MASS INDEX: 36.65 KG/M2

## 2022-11-30 DIAGNOSIS — Z98.890 OTHER SPECIFIED POSTPROCEDURAL STATES: ICD-10-CM

## 2022-11-30 LAB — SARS-COV-2 RNA SPEC QL NAA+PROBE: SIGNIFICANT CHANGE UP

## 2022-11-30 PROCEDURE — 99213 OFFICE O/P EST LOW 20 MIN: CPT

## 2022-11-30 NOTE — ASU PREOP CHECKLIST - NOTHING BY MOUTH SINCE
Discussed patient's wishes for optimized quality of life. 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 Has good family support. Expressed dissatisfaction with disjointed medical care with multiple specialists. Will look for PCP with specialty in Gerontology for further support. Discussed all of the above with patient's daughter Leidy.
69
10-Mar-2019 22:00

## 2022-11-30 NOTE — DISCUSSION/SUMMARY
[de-identified] : 56m with b/l knee djd, history of right knee ACL recon. \par 1) cryotherapy, rest and activity modification\par 2) too soon to repeat csi, discussed availability of visco in the future\par 3) undergoing right foot procedure this week, recommend the use of a rollabout walker after surgery. will defer further treatment to the knees, will consider updated MRI of the right knee . \par 4) rtc prn \par \par \par \par Entered by Marilin Weeks acting as scribe.\par

## 2022-12-02 ENCOUNTER — RESULT REVIEW (OUTPATIENT)
Age: 57
End: 2022-12-02

## 2022-12-02 ENCOUNTER — APPOINTMENT (OUTPATIENT)
Age: 57
End: 2022-12-02

## 2022-12-02 PROCEDURE — 28238 REVISION OF FOOT TENDON: CPT | Mod: LT

## 2022-12-02 PROCEDURE — 29515 APPLICATION SHORT LEG SPLINT: CPT | Mod: 59,LT

## 2022-12-09 ENCOUNTER — APPOINTMENT (OUTPATIENT)
Dept: ORTHOPEDIC SURGERY | Facility: CLINIC | Age: 57
End: 2022-12-09
Payer: COMMERCIAL

## 2022-12-09 VITALS — HEIGHT: 65 IN | BODY MASS INDEX: 36.65 KG/M2 | WEIGHT: 220 LBS

## 2022-12-09 PROCEDURE — 99024 POSTOP FOLLOW-UP VISIT: CPT

## 2022-12-09 PROCEDURE — 29405 APPL SHORT LEG CAST: CPT | Mod: 58

## 2022-12-09 PROCEDURE — 73620 X-RAY EXAM OF FOOT: CPT | Mod: LT

## 2022-12-09 NOTE — DATA REVIEWED
[MRI] : MRI [Right] : of the right [Ankle] : ankle [FreeTextEntry2] : Impression: \par 1. Mild posterior tibial tendinopathy and tenosynovitis with mild inflammatory change at the synchondrosis \par with a type 2 accessory navicular bone without tendon discontinuity or fracture.\par 2. Mild partial tearing with partial splitting of the peroneal brevis tendon with moderate tenosynovitis.\par 3. Peroneal longus tenosynovitis and anterior tibialis tendinopathy.\par 4. No acute fracture, osteochondral lesion, loose body or malalignment.

## 2022-12-09 NOTE — HISTORY OF PRESENT ILLNESS
[Gradual] : gradual [10] : 10 [5] : 5 [Burning] : burning [Localized] : localized [Sharp] : sharp [Shooting] : shooting [Stabbing] : stabbing [Throbbing] : throbbing [Constant] : constant [Household chores] : household chores [Leisure] : leisure [Meds] : meds [Nothing helps with pain getting better] : Nothing helps with pain getting better [Walking] : walking [Full time] : Work status: full time [de-identified] : 10/28/22: Bilateral foot pain left >right ongoing since 2014. Pain also into the big toes with stiffness. Pain medially into the foot. Reports difficulty getting out of bed in the morning. Has seen podiatry in the past where CSI done to both feet with transient relief. Tried shoe modifications. No n/t. No prior injuries to the feet. \par 11/9/22 f/u rodolfo L > R midfoot pain\par \par 12/2/22: L kidner procedure\par \par 12/9/22: f/u L foot; NWB in splint [] : Post Surgical Visit: no [FreeTextEntry1] : B/L Feet  [FreeTextEntry9] : hydrocodone, tylenol [de-identified] : 12/02/2022 [de-identified] : None [de-identified] : Head  [facility]

## 2022-12-09 NOTE — DISCUSSION/SUMMARY
[de-identified] : A well padded cast was applied.  Patient was instructed on proper cast management including keeping it dry and not sticking anything down the cast.  Patient was told that if pain significantly increases or toes turn numb and/or blue and simple elevation does not allow symptoms to improve in a few minutes, to call the office immediately or go to the ER.  All questions were answered.\par

## 2022-12-09 NOTE — PHYSICAL EXAM
[NL (40)] : plantar flexion 40 degrees [NL 30)] : inversion 30 degrees [NL (20)] : eversion 20 degrees [5___] : plantar flexion 5[unfilled]/5 [2+] : dorsalis pedis pulse: 2+ [Left] : left foot and ankle [] : no calf tenderness

## 2022-12-27 ENCOUNTER — APPOINTMENT (OUTPATIENT)
Dept: CARDIOLOGY | Facility: CLINIC | Age: 57
End: 2022-12-27

## 2023-01-04 ENCOUNTER — APPOINTMENT (OUTPATIENT)
Dept: ORTHOPEDIC SURGERY | Facility: CLINIC | Age: 58
End: 2023-01-04
Payer: COMMERCIAL

## 2023-01-04 VITALS — WEIGHT: 220 LBS | BODY MASS INDEX: 36.65 KG/M2 | HEIGHT: 65 IN

## 2023-01-04 PROCEDURE — 99024 POSTOP FOLLOW-UP VISIT: CPT

## 2023-01-04 PROCEDURE — L4361: CPT | Mod: LT

## 2023-01-04 NOTE — PHYSICAL EXAM
[Left] : left foot and ankle [5___] : plantar flexion 5[unfilled]/5 [2+] : dorsalis pedis pulse: 2+ [] : no calf tenderness

## 2023-01-04 NOTE — HISTORY OF PRESENT ILLNESS
[Gradual] : gradual [2] : 2 [Burning] : burning [Localized] : localized [Throbbing] : throbbing [Constant] : constant [Household chores] : household chores [Leisure] : leisure [Meds] : meds [Nothing helps with pain getting better] : Nothing helps with pain getting better [Walking] : walking [Not working due to injury] : Work status: not working due to injury [de-identified] : 10/28/22: Bilateral foot pain left >right ongoing since 2014. Pain also into the big toes with stiffness. Pain medially into the foot. Reports difficulty getting out of bed in the morning. Has seen podiatry in the past where CSI done to both feet with transient relief. Tried shoe modifications. No n/t. No prior injuries to the feet. \par 11/9/22 f/u rodolfo L > R midfoot pain\par \par 12/2/22: L kidner procedure\par \par 12/9/22: f/u L foot; NWB in splint\par 1/4/23  f/u L foot nwb in slc+ [] : Post Surgical Visit: no [FreeTextEntry1] : left foot [FreeTextEntry9] :  tylenol [de-identified] : 12/02/2022 [de-identified] : None [de-identified] : Head  [facility]

## 2023-02-01 ENCOUNTER — APPOINTMENT (OUTPATIENT)
Dept: ORTHOPEDIC SURGERY | Facility: CLINIC | Age: 58
End: 2023-02-01
Payer: COMMERCIAL

## 2023-02-01 VITALS — BODY MASS INDEX: 36.65 KG/M2 | WEIGHT: 220 LBS | HEIGHT: 65 IN

## 2023-02-01 PROCEDURE — 99024 POSTOP FOLLOW-UP VISIT: CPT

## 2023-02-01 NOTE — PHYSICAL EXAM
[Left] : left foot and ankle [5___] : plantar flexion 5[unfilled]/5 [2+] : dorsalis pedis pulse: 2+ [] : no talonavicular joint tenderness

## 2023-02-28 ENCOUNTER — NON-APPOINTMENT (OUTPATIENT)
Age: 58
End: 2023-02-28

## 2023-03-07 ENCOUNTER — NON-APPOINTMENT (OUTPATIENT)
Age: 58
End: 2023-03-07

## 2023-03-07 ENCOUNTER — APPOINTMENT (OUTPATIENT)
Dept: CARDIOLOGY | Facility: CLINIC | Age: 58
End: 2023-03-07
Payer: COMMERCIAL

## 2023-03-07 VITALS — DIASTOLIC BLOOD PRESSURE: 108 MMHG | SYSTOLIC BLOOD PRESSURE: 152 MMHG

## 2023-03-07 VITALS — HEART RATE: 98 BPM

## 2023-03-07 PROCEDURE — 99212 OFFICE O/P EST SF 10 MIN: CPT

## 2023-03-07 RX ORDER — HYDROCODONE BITARTRATE AND ACETAMINOPHEN 5; 325 MG/1; MG/1
5-325 TABLET ORAL
Qty: 20 | Refills: 0 | Status: DISCONTINUED | COMMUNITY
Start: 2022-12-01 | End: 2023-03-07

## 2023-03-15 ENCOUNTER — APPOINTMENT (OUTPATIENT)
Dept: ORTHOPEDIC SURGERY | Facility: CLINIC | Age: 58
End: 2023-03-15

## 2023-03-17 ENCOUNTER — APPOINTMENT (OUTPATIENT)
Dept: CARDIOLOGY | Facility: CLINIC | Age: 58
End: 2023-03-17
Payer: COMMERCIAL

## 2023-03-17 ENCOUNTER — NON-APPOINTMENT (OUTPATIENT)
Age: 58
End: 2023-03-17

## 2023-03-17 VITALS — DIASTOLIC BLOOD PRESSURE: 78 MMHG | SYSTOLIC BLOOD PRESSURE: 118 MMHG

## 2023-03-17 VITALS
WEIGHT: 223 LBS | BODY MASS INDEX: 37.15 KG/M2 | TEMPERATURE: 97.5 F | HEIGHT: 65 IN | OXYGEN SATURATION: 97 % | HEART RATE: 70 BPM | DIASTOLIC BLOOD PRESSURE: 90 MMHG | SYSTOLIC BLOOD PRESSURE: 134 MMHG

## 2023-03-17 PROCEDURE — 93000 ELECTROCARDIOGRAM COMPLETE: CPT

## 2023-03-17 PROCEDURE — 99213 OFFICE O/P EST LOW 20 MIN: CPT

## 2023-03-17 RX ORDER — MECLIZINE HYDROCHLORIDE 25 MG/1
25 TABLET ORAL DAILY
Refills: 0 | Status: DISCONTINUED | COMMUNITY
End: 2023-03-17

## 2023-03-17 RX ORDER — HYALURONATE SODIUM 20 MG/2 ML
20 SYRINGE (ML) INTRAARTICULAR WEEKLY
Qty: 6 | Refills: 0 | Status: DISCONTINUED | COMMUNITY
Start: 2022-05-19 | End: 2023-03-17

## 2023-03-17 NOTE — CARDIOLOGY SUMMARY
[de-identified] : 3/17/23.  Sinus  Rhythm.    Poor R-wave progression -may be secondary to pulmonary disease   consider old anterior infarct.   [de-identified] : 2/16/21. Completed 9 minutes 55 seconds of the Petr protocol achieving 11 METS.  Stress ECG revealed approximately 0.5 mm horizontal ST depressions in the inferior leads. There was a single 3 beat run of wide complex tachycardia and a ventricular couplet during stress.  There are small, mild to moderate defect in the basal inferior and basal septal walls that are fixed suggestive of scar. LVEF was estimated at 70% with reduced systolic thickening of the basal inferior and basal septal walls. [de-identified] : 2/13/21. Normal to hyperdynamic left ventricular systolic function. Mild diastolic dysfunction.  Mild concentric LVH. Normal right ventricular size and function.

## 2023-03-17 NOTE — DISCUSSION/SUMMARY
[With Me] : with me [___ Month(s)] : in [unfilled] month(s) [FreeTextEntry1] : \par Hypertension:   Blood pressure has improved with up titration of carvedilol.  Given intermittent dizziness, unclear etiology for recent blood pressure rise, and diet with weight loss I suggested an ambulatory blood pressure monitor to help guide pharmacotherapy and exclude intermittent hypotension;  continue present antihypertensive regimen.

## 2023-03-17 NOTE — REVIEW OF SYSTEMS
[Weight Loss (___ Lbs)] : [unfilled] ~Ulb weight loss [Dizziness] : dizziness [Dyspnea on exertion] : not dyspnea during exertion [Chest Discomfort] : no chest discomfort [Palpitations] : no palpitations

## 2023-03-17 NOTE — HISTORY OF PRESENT ILLNESS
[FreeTextEntry1] : Scot Aviles is a 57-year-old man with a history of abnormal ECG, hypertension, remote MI (basal inferior/septal), vertigo, chronic back pain, gastroesophageal reflux, osteoarthritis, sarcoidosis (non-cardiac, "in remission"), former tobacco use, obstructive sleep apnea, and chronic foot pain status post recent orthopedic surgical procedures who returns for cardiac examination.  Last week he was found to have a marked elevation of blood pressure (152/108 when taken by our office RN) -his dose of carvedilol was subsequently uptitrated to 12.5 mg twice daily.\par \par He has been tolerating therapy and overall feels well but describes infrequent episodes of mild dizziness; recently started the weight watchers diet and is losing weight.

## 2023-03-17 NOTE — PHYSICAL EXAM
[No Acute Distress] : no acute distress [Obese] : obese [Normal S1, S2] : normal S1, S2 [No Murmur] : no murmur [Clear Lung Fields] : clear lung fields [Normal Speech] : normal speech [Alert and Oriented] : alert and oriented [de-identified] :   Extraocular muscles intact [de-identified] :  wearing a facemask

## 2023-03-22 ENCOUNTER — APPOINTMENT (OUTPATIENT)
Dept: ORTHOPEDIC SURGERY | Facility: CLINIC | Age: 58
End: 2023-03-22
Payer: COMMERCIAL

## 2023-03-22 VITALS — BODY MASS INDEX: 37.15 KG/M2 | HEIGHT: 65 IN | WEIGHT: 223 LBS

## 2023-03-22 PROCEDURE — 99213 OFFICE O/P EST LOW 20 MIN: CPT

## 2023-03-22 NOTE — HISTORY OF PRESENT ILLNESS
[Gradual] : gradual [0] : 0 [Localized] : localized [Constant] : constant [Household chores] : household chores [Leisure] : leisure [Meds] : meds [Physical therapy] : physical therapy [Nothing helps with pain getting better] : Nothing helps with pain getting better [Not working due to injury] : Work status: not working due to injury [de-identified] : 10/28/22: Bilateral foot pain left >right ongoing since 2014. Pain also into the big toes with stiffness. Pain medially into the foot. Reports difficulty getting out of bed in the morning. Has seen podiatry in the past where CSI done to both feet with transient relief. Tried shoe modifications. No n/t. No prior injuries to the feet. \par 11/9/22 f/u rodolfo L > R midfoot pain\par \par 12/2/22: L kidner procedure\par \par 12/9/22: f/u L foot; NWB in splint\par 1/4/23  f/u L foot nwb in slc+\par 2/1/23  f/u L foot wb in cam boot/arch support  PT [] : Post Surgical Visit: no [FreeTextEntry1] : left foot [de-identified] : 12/02/2022 [de-identified] : PT [facility] [de-identified] : Head  [facility]  [de-identified] : 12-2-22

## 2023-05-04 ENCOUNTER — APPOINTMENT (OUTPATIENT)
Dept: INTERNAL MEDICINE | Facility: CLINIC | Age: 58
End: 2023-05-04
Payer: COMMERCIAL

## 2023-05-04 VITALS
OXYGEN SATURATION: 98 % | WEIGHT: 224 LBS | DIASTOLIC BLOOD PRESSURE: 82 MMHG | HEART RATE: 68 BPM | HEIGHT: 65 IN | SYSTOLIC BLOOD PRESSURE: 124 MMHG | RESPIRATION RATE: 16 BRPM | BODY MASS INDEX: 37.32 KG/M2 | TEMPERATURE: 98.1 F

## 2023-05-04 VITALS — SYSTOLIC BLOOD PRESSURE: 122 MMHG | DIASTOLIC BLOOD PRESSURE: 80 MMHG

## 2023-05-04 DIAGNOSIS — Z83.3 FAMILY HISTORY OF DIABETES MELLITUS: ICD-10-CM

## 2023-05-04 DIAGNOSIS — M76.821 POSTERIOR TIBIAL TENDINITIS, RIGHT LEG: ICD-10-CM

## 2023-05-04 DIAGNOSIS — M25.561 PAIN IN RIGHT KNEE: ICD-10-CM

## 2023-05-04 DIAGNOSIS — M25.552 PAIN IN RIGHT HIP: ICD-10-CM

## 2023-05-04 DIAGNOSIS — Z87.891 PERSONAL HISTORY OF NICOTINE DEPENDENCE: ICD-10-CM

## 2023-05-04 DIAGNOSIS — M79.605 PAIN IN LEFT LEG: ICD-10-CM

## 2023-05-04 DIAGNOSIS — Z82.0 FAMILY HISTORY OF EPILEPSY AND OTHER DISEASES OF THE NERVOUS SYSTEM: ICD-10-CM

## 2023-05-04 DIAGNOSIS — M25.551 PAIN IN RIGHT HIP: ICD-10-CM

## 2023-05-04 DIAGNOSIS — M51.26 OTHER INTERVERTEBRAL DISC DISPLACEMENT, LUMBAR REGION: ICD-10-CM

## 2023-05-04 DIAGNOSIS — Z86.79 PERSONAL HISTORY OF OTHER DISEASES OF THE CIRCULATORY SYSTEM: ICD-10-CM

## 2023-05-04 DIAGNOSIS — M25.562 PAIN IN RIGHT KNEE: ICD-10-CM

## 2023-05-04 DIAGNOSIS — Z01.818 ENCOUNTER FOR OTHER PREPROCEDURAL EXAMINATION: ICD-10-CM

## 2023-05-04 DIAGNOSIS — F19.91 OTHER PSYCHOACTIVE SUBSTANCE USE, UNSPECIFIED, IN REMISSION: ICD-10-CM

## 2023-05-04 DIAGNOSIS — Z86.010 PERSONAL HISTORY OF COLONIC POLYPS: ICD-10-CM

## 2023-05-04 DIAGNOSIS — M79.609 PAIN IN UNSPECIFIED LIMB: ICD-10-CM

## 2023-05-04 DIAGNOSIS — Z88.9 ALLERGY STATUS TO UNSPECIFIED DRUGS, MEDICAMENTS AND BIOLOGICAL SUBSTANCES: ICD-10-CM

## 2023-05-04 DIAGNOSIS — M75.40 IMPINGEMENT SYNDROME OF UNSPECIFIED SHOULDER: ICD-10-CM

## 2023-05-04 DIAGNOSIS — M70.62 TROCHANTERIC BURSITIS, LEFT HIP: ICD-10-CM

## 2023-05-04 DIAGNOSIS — Z87.39 PERSONAL HISTORY OF OTHER DISEASES OF THE MUSCULOSKELETAL SYSTEM AND CONNECTIVE TISSUE: ICD-10-CM

## 2023-05-04 DIAGNOSIS — F19.11 OTHER PSYCHOACTIVE SUBSTANCE ABUSE, IN REMISSION: ICD-10-CM

## 2023-05-04 DIAGNOSIS — K42.9 UMBILICAL HERNIA W/OUT OBSTRUCTION OR GANGRENE: ICD-10-CM

## 2023-05-04 DIAGNOSIS — Z80.42 FAMILY HISTORY OF MALIGNANT NEOPLASM OF PROSTATE: ICD-10-CM

## 2023-05-04 DIAGNOSIS — F10.21 ALCOHOL DEPENDENCE, IN REMISSION: ICD-10-CM

## 2023-05-04 DIAGNOSIS — R07.89 OTHER CHEST PAIN: ICD-10-CM

## 2023-05-04 DIAGNOSIS — M54.16 RADICULOPATHY, LUMBAR REGION: ICD-10-CM

## 2023-05-04 DIAGNOSIS — I25.2 OLD MYOCARDIAL INFARCTION: ICD-10-CM

## 2023-05-04 DIAGNOSIS — Z83.1 FAMILY HISTORY OF OTHER INFECTIOUS AND PARASITIC DISEASES: ICD-10-CM

## 2023-05-04 PROCEDURE — 99203 OFFICE O/P NEW LOW 30 MIN: CPT | Mod: 25

## 2023-05-04 PROCEDURE — 36415 COLL VENOUS BLD VENIPUNCTURE: CPT

## 2023-05-04 RX ORDER — BLOOD PRESSURE TEST KIT-LARGE
KIT MISCELLANEOUS
Qty: 1 | Refills: 1 | Status: ACTIVE | COMMUNITY
Start: 2023-05-04 | End: 1900-01-01

## 2023-05-07 PROBLEM — Z88.9 HISTORY OF SEASONAL ALLERGIES: Status: ACTIVE | Noted: 2023-05-07

## 2023-05-07 PROBLEM — M70.62 TROCHANTERIC BURSITIS OF LEFT HIP: Status: RESOLVED | Noted: 2020-11-14 | Resolved: 2023-05-07

## 2023-05-07 PROBLEM — M76.821 POSTERIOR TIBIAL TENDINITIS OF RIGHT LOWER EXTREMITY: Status: RESOLVED | Noted: 2022-10-28 | Resolved: 2023-05-07

## 2023-05-07 PROBLEM — Z83.3 FAMILY HISTORY OF DIABETES MELLITUS: Status: ACTIVE | Noted: 2023-05-07

## 2023-05-07 PROBLEM — M79.609 LIMB PAIN: Status: RESOLVED | Noted: 2020-11-13 | Resolved: 2023-05-07

## 2023-05-07 PROBLEM — M79.605 PAIN OF LEFT LOWER EXTREMITY: Status: RESOLVED | Noted: 2020-11-13 | Resolved: 2023-05-07

## 2023-05-07 PROBLEM — Z87.891 FORMER CIGARETTE SMOKER: Status: ACTIVE | Noted: 2023-05-07

## 2023-05-07 PROBLEM — M25.561 ACUTE PAIN OF BOTH KNEES: Status: RESOLVED | Noted: 2022-05-18 | Resolved: 2023-05-07

## 2023-05-07 PROBLEM — Z01.818 PRE-OP EXAM: Status: RESOLVED | Noted: 2021-02-13 | Resolved: 2023-05-07

## 2023-05-07 PROBLEM — F19.11 HISTORY OF DRUG ABUSE: Status: ACTIVE | Noted: 2023-05-07

## 2023-05-07 PROBLEM — F19.91 HISTORY OF DRUG USE: Status: ACTIVE | Noted: 2023-05-07

## 2023-05-07 PROBLEM — I25.2 OLD MYOCARDIAL INFARCTION: Status: RESOLVED | Noted: 2021-02-17 | Resolved: 2023-05-07

## 2023-05-07 PROBLEM — R07.89 ATYPICAL CHEST PAIN: Status: RESOLVED | Noted: 2021-02-03 | Resolved: 2023-05-07

## 2023-05-07 PROBLEM — Z86.010 HISTORY OF COLON POLYPS: Status: ACTIVE | Noted: 2023-05-07

## 2023-05-07 PROBLEM — Z82.0 FAMILY HISTORY OF ALZHEIMER'S DISEASE: Status: ACTIVE | Noted: 2023-05-07

## 2023-05-07 PROBLEM — Z80.42 FHX: PROSTATE CANCER: Status: ACTIVE | Noted: 2023-05-07

## 2023-05-07 PROBLEM — Z83.1 FAMILY HISTORY OF TYPE C VIRAL HEPATITIS: Status: ACTIVE | Noted: 2023-05-07

## 2023-05-07 PROBLEM — M75.40 SHOULDER IMPINGEMENT SYNDROME: Status: RESOLVED | Noted: 2019-12-16 | Resolved: 2023-05-07

## 2023-05-07 PROBLEM — M25.551 BILATERAL HIP PAIN: Status: RESOLVED | Noted: 2020-11-14 | Resolved: 2023-05-07

## 2023-05-07 PROBLEM — F10.21 ALCOHOLISM IN RECOVERY: Status: ACTIVE | Noted: 2023-05-07

## 2023-05-07 PROBLEM — Z86.79 HISTORY OF ABNORMAL ELECTROCARDIOGRAPHY: Status: RESOLVED | Noted: 2021-02-03 | Resolved: 2023-05-07

## 2023-05-07 LAB
25(OH)D3 SERPL-MCNC: 15.5 NG/ML
ALBUMIN SERPL ELPH-MCNC: 4.6 G/DL
ALP BLD-CCNC: 88 U/L
ALT SERPL-CCNC: 23 U/L
ANION GAP SERPL CALC-SCNC: 13 MMOL/L
APPEARANCE: CLEAR
AST SERPL-CCNC: 21 U/L
BACTERIA: NEGATIVE /HPF
BASOPHILS # BLD AUTO: 0.06 K/UL
BASOPHILS NFR BLD AUTO: 1.1 %
BILIRUB SERPL-MCNC: 0.3 MG/DL
BILIRUBIN URINE: NEGATIVE
BLOOD URINE: NEGATIVE
BUN SERPL-MCNC: 16 MG/DL
C TRACH RRNA SPEC QL NAA+PROBE: NOT DETECTED
CALCIUM SERPL-MCNC: 9.3 MG/DL
CAST: 0 /LPF
CHLORIDE SERPL-SCNC: 104 MMOL/L
CHOLEST SERPL-MCNC: 134 MG/DL
CO2 SERPL-SCNC: 23 MMOL/L
COLOR: YELLOW
CREAT SERPL-MCNC: 1 MG/DL
CREAT SPEC-SCNC: 76 MG/DL
CREAT/PROT UR: 0.1 RATIO
EGFR: 87 ML/MIN/1.73M2
EOSINOPHIL # BLD AUTO: 0.29 K/UL
EOSINOPHIL NFR BLD AUTO: 5.4 %
EPITHELIAL CELLS: 0 /HPF
ESTIMATED AVERAGE GLUCOSE: 126 MG/DL
GLUCOSE QUALITATIVE U: NEGATIVE MG/DL
GLUCOSE SERPL-MCNC: 113 MG/DL
HBA1C MFR BLD HPLC: 6 %
HBV CORE IGG+IGM SER QL: NONREACTIVE
HBV SURFACE AB SER QL: NONREACTIVE
HBV SURFACE AG SER QL: NONREACTIVE
HCT VFR BLD CALC: 46.3 %
HCV AB SER QL: NONREACTIVE
HCV S/CO RATIO: 0.12 S/CO
HDLC SERPL-MCNC: 42 MG/DL
HGB BLD-MCNC: 15.2 G/DL
HIV1+2 AB SPEC QL IA.RAPID: NONREACTIVE
IMM GRANULOCYTES NFR BLD AUTO: 0.2 %
KETONES URINE: NEGATIVE MG/DL
LDLC SERPL CALC-MCNC: 78 MG/DL
LEUKOCYTE ESTERASE URINE: NEGATIVE
LYMPHOCYTES # BLD AUTO: 1.77 K/UL
LYMPHOCYTES NFR BLD AUTO: 32.7 %
MAN DIFF?: NORMAL
MCHC RBC-ENTMCNC: 28.9 PG
MCHC RBC-ENTMCNC: 32.8 GM/DL
MCV RBC AUTO: 88 FL
MICROSCOPIC-UA: NORMAL
MONOCYTES # BLD AUTO: 0.71 K/UL
MONOCYTES NFR BLD AUTO: 13.1 %
N GONORRHOEA RRNA SPEC QL NAA+PROBE: NOT DETECTED
NEUTROPHILS # BLD AUTO: 2.58 K/UL
NEUTROPHILS NFR BLD AUTO: 47.5 %
NITRITE URINE: NEGATIVE
NONHDLC SERPL-MCNC: 92 MG/DL
PH URINE: 5.5
PLATELET # BLD AUTO: 236 K/UL
POTASSIUM SERPL-SCNC: 4.2 MMOL/L
PROT SERPL-MCNC: 7.5 G/DL
PROT UR-MCNC: 7 MG/DL
PROTEIN URINE: NEGATIVE MG/DL
PSA SERPL-MCNC: 2.46 NG/ML
RBC # BLD: 5.26 M/UL
RBC # FLD: 12.4 %
RED BLOOD CELLS URINE: 0 /HPF
SODIUM SERPL-SCNC: 140 MMOL/L
SOURCE AMPLIFICATION: NORMAL
SPECIFIC GRAVITY URINE: 1.01
T PALLIDUM AB SER QL IA: NEGATIVE
TRIGL SERPL-MCNC: 70 MG/DL
TSH SERPL-ACNC: 2.21 UIU/ML
UROBILINOGEN URINE: 0.2 MG/DL
WBC # FLD AUTO: 5.42 K/UL
WHITE BLOOD CELLS URINE: 0 /HPF

## 2023-05-07 NOTE — ASSESSMENT
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 Reports adherent.\par -followed by cardio, last seen 3/23 w/o med changes made. Ambulatory BP monitoring ordered (pending). Has f/u 6/23.\par -home BP monitoring encouraged, Rx escribed\par -optho referral for screening\par -low salt diet, exercise, wt loss advised\par -check cmp, TSH, screening UA/pr\par \par hx DONG (on CPAP), hx sarcoidosis (in remission)-\par -followed by pulm, Dr. Suazo - last seen 1.5 yrs ago- no changes made.\par -hx sarcoidosis dx'd 1994 on w/u of CP when noted LAD on imaging, dx'd s/p bronchoscopy with +LN bx. Told in remission > 10 yrs.\par -hx CT in past, told fine- unsure when last \par -CPAP adherence encouraged\par -asked to forward records\par -wt loss encouraged\par \par hx itching- chronic, unclear etiology\par -on Benadryl use prn, ~ 2x/wk - helps\par -followed by A/I- seen last week for eval of gen itching (no rash) of unclear etiology, testing started, no med changs made.\par -had office allergy testing at visit, told no environmental allergies\par -had labs done today for allergy testing, results pending - then planned to f/u\par -asked to forward records\par \par hx OA in feet-\par -followed by ortho, told needs surgery of right foot OA repair (b/l great toes)- no set date yet\par -hx PT, doing 3x/wk with help\par -no OTC pain meds used\par -no assistive device used\par -advised to f/u 1-2 weeks prior to any planned surgery if medical clearance needed\par \par hx umbilical hernia- hx repair 2017, + on exam\par -advised of need for prompt medical eval if sx onset or not reducible\par \par obesity -BMI 37, hx intentional wt loss ~ 15 lbs with doing WW since 2/23 - congratulated!\par -risks of obesity discussed\par -benefits of weight loss discussed\par -low fat, low cholesterol, low carbohydrate diet advised\par -smaller portion sizes encouraged\par -advised avoidance of sugary drinks\par -aerobic exercise encouraged\par -weight loss advised\par -declines med wt loss program referral \par -declines nutrition referral\par -check A1c, vit d\par \par MISC:  Continued social distancing and measure for covid19 prevention encouraged.  \par -hx pfizer series, hx booster 10/22\par \par \par HCM\par -advised to f/u for CPE\par -check screening labs; agreeable to STD/HIV screening\par -PSA screening, risks/benefits discussed\par -hx flu shot 9/22\par -hx screening colonoscopy 2021: polyps (told precancerous) rec: repeat 3 yrs.  Awaiting repeat 5/16/23- asked to forward record.\par -derm referral for screening.  Regular use of sun block for skin cancer prevention advised.\par -cont'd smoking/drug cessation and sobriety (has sponsor, attends AA prn) encouraged\par \par \par Pt's cell: [phone]\par \par Labs drawn in office today.\par

## 2023-05-07 NOTE — HISTORY OF PRESENT ILLNESS
[Spouse] : spouse [] :  [Working Full Time] : working full time [Occupation ___] : occupation: [unfilled] [Former Cigarette Smoker] : is a former cigarette smoker [Quit Cigarettes: ___] : quit smoking [unfilled] [In Recovery] : being in recovery from alcohol abuse [FreeTextEntry1] : \par Needs new PMD. [de-identified] : \par Here to establish care.\par \par Feeling well.\par Does not need med refills.\par Last ate > 4 hrs ago.\par \par Reports is socially distancing and using precautions for covid prevention.\par Denies sick or covid positive contacts.\par Denies fever, chills, cough or sob.\par -hx pfizer series, hx booster 10/22\par \par HTN, CAD hx remote MI- \par -carvedilol 12.5 bid, taking x 2 yrs (dose increased 3/23)\par -atorvastatin 20 mg, taking x 2 yrs, denies SE.  Reports adherent.\par -followed by cardio, last seen 3/23 w/o med changes made.  Ambulatory BP monitoring ordered (pending). Has f/u 6/23.\par -no home BP checks, no machine at home\par -has low salt diet\par -denies CP, sob, palpitations or leg swelling\par \par Reports intentional wt loss ~ 15 lbs with doing WW since 2/23\par no formal exercise, is - stays active\par \par hx DONG- dx'd 4 yrs ago, on CPAP- using on/off\par -followed by pulm, Dr. Suazo - last seen 1.5 yrs ago- no changes made.\par \par hx sarcoidosis- dx'd 1994 on w/u of CP when noted LAD on imaging, dx'd s/p bronchoscopy with +LN bx.  Told in remission > 10 yrs.\par -hx CT in past, told fine- unsure when last \par -followed by pulm\par \par Followed by A/I- seen last week for eval of gen itching (no rash) of unclear etiology, testing started, no med changs made.\par -on Benadryl use prn, ~ 2x/wk - helps\par -had office allergy testing at visit, told no environmental allergies\par -had labs done today for allergy testing, results pending - then planned to f/u\par \par Reports nl appetite and BMs.\par Denies GI complaints.\par -hx colonoscopy 2021: + polyps (told precancerous) rec: repeat 3 yrs.  Repeat pending 5/16/23.\par \par hx OA in feet-\par -followed by ortho, told needs surgery of right foot OA repair (b/l great toes)- no set date yet\par -hx PT, doing 3x/wk with help\par -no OTC pain meds used\par -no assistive device used\par \par Denies  complaints.\par \par Denies depression or anxiety.\par  [de-identified] : adult son [de-identified] :  1-2 ppd on/off x 10 yrs [de-identified] : since 11/21- has AA/sponsor as needed [de-identified] : cocaine snorting, denies hx IVDA- clean x 30 yrs [de-identified] : hx flu shot 9/22

## 2023-05-07 NOTE — PHYSICAL EXAM
[No Acute Distress] : no acute distress [Well-Appearing] : well-appearing [Normal Sclera/Conjunctiva] : normal sclera/conjunctiva [PERRL] : pupils equal round and reactive to light [EOMI] : extraocular movements intact [Normal Outer Ear/Nose] : the outer ears and nose were normal in appearance [Normal Oropharynx] : the oropharynx was normal [Normal TMs] : both tympanic membranes were normal [Normal Nasal Mucosa] : the nasal mucosa was normal [No Lymphadenopathy] : no lymphadenopathy [Supple] : supple [Thyroid Normal, No Nodules] : the thyroid was normal and there were no nodules present [No Respiratory Distress] : no respiratory distress  [Clear to Auscultation] : lungs were clear to auscultation bilaterally [Normal Rate] : normal rate  [Regular Rhythm] : with a regular rhythm [Normal S1, S2] : normal S1 and S2 [No Murmur] : no murmur heard [Pedal Pulses Present] : the pedal pulses are present [No Edema] : there was no peripheral edema [Soft] : abdomen soft [Non Tender] : non-tender [Non-distended] : non-distended [No Masses] : no abdominal mass palpated [No HSM] : no HSM [Normal Supraclavicular Nodes] : no supraclavicular lymphadenopathy [Normal Posterior Cervical Nodes] : no posterior cervical lymphadenopathy [Normal Anterior Cervical Nodes] : no anterior cervical lymphadenopathy [No CVA Tenderness] : no CVA  tenderness [No Spinal Tenderness] : no spinal tenderness [No Joint Swelling] : no joint swelling [Grossly Normal Strength/Tone] : grossly normal strength/tone [No Rash] : no rash [No Focal Deficits] : no focal deficits [Normal Gait] : normal gait [Normal Affect] : the affect was normal [Alert and Oriented x3] : oriented to person, place, and time [de-identified] : +ventral hernia, reducible, nontender [de-identified] : scattered freckles

## 2023-05-16 DIAGNOSIS — K57.90 DIVERTICULOSIS OF INTESTINE, PART UNSPECIFIED, W/OUT PERFORATION OR ABSCESS W/OUT BLEEDING: ICD-10-CM

## 2023-05-16 DIAGNOSIS — Z87.19 PERSONAL HISTORY OF OTHER DISEASES OF THE DIGESTIVE SYSTEM: ICD-10-CM

## 2023-05-26 ENCOUNTER — APPOINTMENT (OUTPATIENT)
Dept: ORTHOPEDIC SURGERY | Facility: CLINIC | Age: 58
End: 2023-05-26
Payer: COMMERCIAL

## 2023-05-26 VITALS — WEIGHT: 215 LBS | BODY MASS INDEX: 35.82 KG/M2 | HEIGHT: 65 IN

## 2023-05-26 PROCEDURE — 99214 OFFICE O/P EST MOD 30 MIN: CPT | Mod: 57

## 2023-05-26 NOTE — HISTORY OF PRESENT ILLNESS
[8] : 8 [Localized] : localized [Sharp] : sharp [Full time] : Work status: full time [de-identified] : 10/28/22: Bilateral foot pain left >right ongoing since 2014. Pain also into the big toes with stiffness. Pain medially into the foot. Reports difficulty getting out of bed in the morning. Has seen podiatry in the past where CSI done to both feet with transient relief. Tried shoe modifications. No n/t. No prior injuries to the feet. \par 11/9/22 f/u rodolfo L > R midfoot pain\par \par 12/2/22: L kidner procedure\par \par 12/9/22: f/u L foot; NWB in splint\par 1/4/23  f/u L foot nwb in slc+\par 2/1/23  f/u L foot wb in cam boot/arch support  PT\par \par 5/26/23  Pre-op R foot [] : Post Surgical Visit: no [FreeTextEntry1] : B/L Feet  [FreeTextEntry3] : N/A Chronic [FreeTextEntry5] : 58 Y/O RHD M eval R foot. Pt presents with many yaers of atraumatic pain due to HX of OA and excessive navicular bone. HX of L foot SX on 12/2/22. Pt states R foot remains problematic  [de-identified] : None [de-identified] : Head  [facility]

## 2023-05-26 NOTE — PHYSICAL EXAM
[Right] : right foot and ankle [Mild] : mild swelling of MTP joint/great toe [1st] : 1st [1+] : dorsalis pedis pulse: 1+ [] : Sensation present to light touch in all distributions [de-identified] : MTP joint DF 40 degrees

## 2023-05-26 NOTE — DISCUSSION/SUMMARY
[Surgical risks reviewed] : Surgical risks reviewed [de-identified] : The risks, benefits, alternatives have been discussed.  The risks include but are not limited to infection, bleeding, injury to small nerves and blood vessels, pain, stiffness, progression, dvt, PE, amputation and death.\par \par

## 2023-06-05 ENCOUNTER — APPOINTMENT (OUTPATIENT)
Dept: OPHTHALMOLOGY | Facility: CLINIC | Age: 58
End: 2023-06-05

## 2023-06-20 ENCOUNTER — NON-APPOINTMENT (OUTPATIENT)
Age: 58
End: 2023-06-20

## 2023-06-20 ENCOUNTER — APPOINTMENT (OUTPATIENT)
Dept: CARDIOLOGY | Facility: CLINIC | Age: 58
End: 2023-06-20
Payer: COMMERCIAL

## 2023-06-20 VITALS
HEIGHT: 65 IN | OXYGEN SATURATION: 95 % | RESPIRATION RATE: 14 BRPM | BODY MASS INDEX: 35.82 KG/M2 | HEART RATE: 79 BPM | WEIGHT: 215 LBS

## 2023-06-20 VITALS — OXYGEN SATURATION: 95 % | DIASTOLIC BLOOD PRESSURE: 80 MMHG | HEART RATE: 79 BPM | SYSTOLIC BLOOD PRESSURE: 126 MMHG

## 2023-06-20 PROCEDURE — 93000 ELECTROCARDIOGRAM COMPLETE: CPT | Mod: NC

## 2023-06-20 PROCEDURE — 99214 OFFICE O/P EST MOD 30 MIN: CPT

## 2023-06-20 RX ORDER — ASPIRIN 81 MG
81 TABLET, DELAYED RELEASE (ENTERIC COATED) ORAL
Refills: 0 | Status: ACTIVE | COMMUNITY

## 2023-06-20 NOTE — CARDIOLOGY SUMMARY
[de-identified] : 6/20/2023.  Sinus  Rhythm.   Poor R-wave progression (cannot exclude old anteroseptal infarct).  Possible inferior infarct, old. \par  Low voltage -possible pulmonary disease.  [de-identified] : 2/16/21. Completed 9 minutes 55 seconds of the Petr protocol achieving 11 METS.  Stress ECG revealed approximately 0.5 mm horizontal ST depressions in the inferior leads. There was a single 3 beat run of wide complex tachycardia and a ventricular couplet during stress.  There are small, mild to moderate defect in the basal inferior and basal septal walls that are fixed suggestive of scar. LVEF was estimated at 70% with reduced systolic thickening of the basal inferior and basal septal walls. [de-identified] : 2/13/21. Normal to hyperdynamic left ventricular systolic function. Mild diastolic dysfunction.  Mild concentric LVH. Normal right ventricular size and function.

## 2023-06-20 NOTE — REVIEW OF SYSTEMS
[Weight Loss (___ Lbs)] : [unfilled] ~Ulb weight loss [Dyspnea on exertion] : not dyspnea during exertion [Chest Discomfort] : no chest discomfort [Palpitations] : no palpitations

## 2023-06-20 NOTE — PHYSICAL EXAM
[No Acute Distress] : no acute distress [Obese] : obese [Normal S1, S2] : normal S1, S2 [Clear Lung Fields] : clear lung fields [Normal Speech] : normal speech [Alert and Oriented] : alert and oriented [de-identified] :   Extraocular muscles intact

## 2023-06-20 NOTE — HISTORY OF PRESENT ILLNESS
[FreeTextEntry1] : Scot Aviles is a 57-year-old man with a history of abnormal ECG, hypertension, remote MI (basal inferior/septal), vertigo, chronic back pain, gastroesophageal reflux, osteoarthritis, sarcoidosis (non-cardiac, "in remission"), former tobacco use, obstructive sleep apnea, and chronic foot pain status post recent orthopedic surgical procedures who returns for cardiac examination.\par \par He has undergone left foot surgery–now scheduled for a similar operation on the right foot later this month.  He has no new cardiovascular problems–no angina, dyspnea, or palpitations; blood pressure has improved following earlier titration of medication.

## 2023-06-20 NOTE — DISCUSSION/SUMMARY
[With Me] : with me [___ Month(s)] : in [unfilled] month(s) [FreeTextEntry1] : \par Preoperative cardiac examination: Stable, chronic heart disease–optimized for upcoming orthopedic surgical procedure; continue perioperative beta-blockade with carvedilol.\par \par Hypertension:   Satisfactory control; continue carvedilol  12.5 mg twice daily\par \par  abnormal ECG: Similar to previous tracings; no new cardiac symptoms.\par \par Coronary artery disease / old myocardial infarction: Stable; continue aspirin, carvedilol, and atorvastatin.

## 2023-07-17 ENCOUNTER — APPOINTMENT (OUTPATIENT)
Age: 58
End: 2023-07-17
Payer: COMMERCIAL

## 2023-07-17 PROCEDURE — 73620 X-RAY EXAM OF FOOT: CPT | Mod: 26,RT

## 2023-07-17 PROCEDURE — 64450 NJX AA&/STRD OTHER PN/BRANCH: CPT | Mod: 59,RT

## 2023-07-17 PROCEDURE — 28289 CORRJ HALUX RIGDUS W/O IMPLT: CPT | Mod: T5

## 2023-07-17 PROCEDURE — 28310 REVISION OF BIG TOE: CPT | Mod: 59,T4

## 2023-07-28 ENCOUNTER — APPOINTMENT (OUTPATIENT)
Dept: ORTHOPEDIC SURGERY | Facility: CLINIC | Age: 58
End: 2023-07-28
Payer: COMMERCIAL

## 2023-07-28 DIAGNOSIS — M76.822 POSTERIOR TIBIAL TENDINITIS, LEFT LEG: ICD-10-CM

## 2023-07-28 PROCEDURE — 99024 POSTOP FOLLOW-UP VISIT: CPT

## 2023-07-28 PROCEDURE — 73630 X-RAY EXAM OF FOOT: CPT | Mod: RT

## 2023-07-28 NOTE — PHYSICAL EXAM
[Right] : right foot and ankle [Mild] : mild swelling of MTP joint/great toe [1st] : 1st [1+] : dorsalis pedis pulse: 1+ [] : no calf tenderness [FreeTextEntry3] : pin intact [de-identified] : knee scooter [de-identified] : MTP joint DF 40 degrees

## 2023-07-28 NOTE — DISCUSSION/SUMMARY
[de-identified] : New dressing applied\par Heel WB with post op shoe\par f/u 2-3 weeks for pin and suture removal \par ASA 325mg for air travel

## 2023-07-28 NOTE — HISTORY OF PRESENT ILLNESS
[de-identified] : 10/28/22: Bilateral foot pain left >right ongoing since 2014. Pain also into the big toes with stiffness. Pain medially into the foot. Reports difficulty getting out of bed in the morning. Has seen podiatry in the past where CSI done to both feet with transient relief. Tried shoe modifications. No n/t. No prior injuries to the feet. \par 11/9/22 f/u rodolfo L > R midfoot pain\par \par 12/2/22: L kidner procedure\par \par 12/9/22: f/u L foot; NWB in splint\par 1/4/23  f/u L foot nwb in slc+\par 2/1/23  f/u L foot wb in cam boot/arch support  PT\par 5/26/23  Pre-op R foot\par \par 7/17/23: R foot cheilectomy and prox phalanx osteotomy \par \par 7/28/23: f/u R foot; NWB with knee scooter and post op shoe

## 2023-07-31 ENCOUNTER — APPOINTMENT (OUTPATIENT)
Dept: ORTHOPEDIC SURGERY | Facility: CLINIC | Age: 58
End: 2023-07-31
Payer: COMMERCIAL

## 2023-07-31 VITALS — WEIGHT: 215 LBS | BODY MASS INDEX: 35.82 KG/M2 | HEIGHT: 65 IN

## 2023-07-31 PROCEDURE — 99024 POSTOP FOLLOW-UP VISIT: CPT

## 2023-07-31 NOTE — PHYSICAL EXAM
[] : intact incisions with sutures in place [FreeTextEntry3] : Dressing clean, dry and intact Dressing removed, sutures intact

## 2023-07-31 NOTE — ASSESSMENT
[FreeTextEntry1] : Dressing removed - incision clean, dry with sutures intact New dressing applied Discussed elevation  Follow up with Dr. Mathews as scheduled

## 2023-07-31 NOTE — HISTORY OF PRESENT ILLNESS
[9] : 9 [de-identified] : 7/31/23: Patient is a 59 yo male s/p right foot cheilectomy and prox phalanx ostetomy on 7/17/23 with Dr. Mathews. Doing well, just issues with his dressing, states it is irritating his pin. No f/c/s.  [FreeTextEntry5] : patient is a patient of dr. otto. sp 2 weeks ago r foot, went to get dressing changed on friday but it came apart. tingling in the foot, constant pain

## 2023-08-03 ENCOUNTER — APPOINTMENT (OUTPATIENT)
Dept: ORTHOPEDIC SURGERY | Facility: CLINIC | Age: 58
End: 2023-08-03
Payer: COMMERCIAL

## 2023-08-03 DIAGNOSIS — G57.91 UNSPECIFIED MONONEUROPATHY OF RIGHT LOWER LIMB: ICD-10-CM

## 2023-08-03 PROCEDURE — 99024 POSTOP FOLLOW-UP VISIT: CPT

## 2023-08-03 NOTE — PHYSICAL EXAM
[Right] : right foot and ankle [Mild] : mild swelling of MTP joint/great toe [1st] : 1st [1+] : dorsalis pedis pulse: 1+ [] : no calf tenderness [FreeTextEntry3] : pin intact [de-identified] : knee scooter

## 2023-08-03 NOTE — HISTORY OF PRESENT ILLNESS
[10] : 10 [Sharp] : sharp [Tingling] : tingling [de-identified] : 10/28/22: Bilateral foot pain left >right ongoing since 2014. Pain also into the big toes with stiffness. Pain medially into the foot. Reports difficulty getting out of bed in the morning. Has seen podiatry in the past where CSI done to both feet with transient relief. Tried shoe modifications. No n/t. No prior injuries to the feet.  11/9/22 f/u rodolfo L > R midfoot pain  12/2/22: L kidner procedure  12/9/22: f/u L foot; NWB in splint 1/4/23  f/u L foot nwb in slc+ 2/1/23  f/u L foot wb in cam boot/arch support  PT 5/26/23  Pre-op R foot  7/17/23: R foot cheilectomy and prox phalanx osteotomy  7/28/23: f/u R foot; NWB with knee scooter and post op shoe  8/03/2023:f/u R foot; NWB in post op shoe with knee scooter. states he went to [facility] on 07/31/2023 had to get his foot rewrapped. There is tingling and numbness to the foot. Hx SCS implanted with removal   [] : no [FreeTextEntry6] : numbness

## 2023-08-03 NOTE — DISCUSSION/SUMMARY
[de-identified] : New dressing applied Heel WB with post op shoe can consider EMG LE if tingling persists  ASA 325mg for air travel will start neurontin 300mg QHS 1 week for pin and suture removal

## 2023-08-17 ENCOUNTER — APPOINTMENT (OUTPATIENT)
Dept: ORTHOPEDIC SURGERY | Facility: CLINIC | Age: 58
End: 2023-08-17
Payer: COMMERCIAL

## 2023-08-17 PROCEDURE — 99024 POSTOP FOLLOW-UP VISIT: CPT

## 2023-08-17 NOTE — PHYSICAL EXAM
[Right] : right foot and ankle [Mild] : mild swelling of MTP joint/great toe [1st] : 1st [1+] : dorsalis pedis pulse: 1+ [] : no calf tenderness [FreeTextEntry3] : k-wire removed without complication [de-identified] : knee scooter

## 2023-08-17 NOTE — HISTORY OF PRESENT ILLNESS
[de-identified] : 10/28/22: Bilateral foot pain left >right ongoing since 2014. Pain also into the big toes with stiffness. Pain medially into the foot. Reports difficulty getting out of bed in the morning. Has seen podiatry in the past where CSI done to both feet with transient relief. Tried shoe modifications. No n/t. No prior injuries to the feet.  11/9/22 f/u rodolfo L > R midfoot pain  12/2/22: L kidner procedure  12/9/22: f/u L foot; NWB in splint 1/4/23  f/u L foot nwb in slc+ 2/1/23  f/u L foot wb in cam boot/arch support  PT 5/26/23  Pre-op R foot  7/17/23: R foot cheilectomy and prox phalanx osteotomy   7/28/23: f/u R foot; NWB with knee scooter and post op shoe 8/03/2023:f/u R foot; NWB in post op shoe with knee scooter. states he went to [facility] on 07/31/2023 had to get his foot rewrapped. There is tingling and numbness to the foot. Hx SCS implanted with removal  8/17/23: f/u R foot;  nwb in post op shoe with knee scooter, dressing fell off again. taking gabapentin with relief of the tingling in his foot.

## 2023-08-29 ENCOUNTER — APPOINTMENT (OUTPATIENT)
Dept: INTERNAL MEDICINE | Facility: CLINIC | Age: 58
End: 2023-08-29
Payer: COMMERCIAL

## 2023-08-29 VITALS
DIASTOLIC BLOOD PRESSURE: 78 MMHG | TEMPERATURE: 99 F | RESPIRATION RATE: 16 BRPM | WEIGHT: 219 LBS | BODY MASS INDEX: 36.49 KG/M2 | OXYGEN SATURATION: 97 % | HEART RATE: 97 BPM | SYSTOLIC BLOOD PRESSURE: 128 MMHG | HEIGHT: 65 IN

## 2023-08-29 DIAGNOSIS — Z87.898 PERSONAL HISTORY OF OTHER SPECIFIED CONDITIONS: ICD-10-CM

## 2023-08-29 DIAGNOSIS — E66.9 OBESITY, UNSPECIFIED: ICD-10-CM

## 2023-08-29 DIAGNOSIS — Z01.810 ENCOUNTER FOR PREPROCEDURAL CARDIOVASCULAR EXAMINATION: ICD-10-CM

## 2023-08-29 DIAGNOSIS — Z00.00 ENCOUNTER FOR GENERAL ADULT MEDICAL EXAMINATION W/OUT ABNORMAL FINDINGS: ICD-10-CM

## 2023-08-29 DIAGNOSIS — Z11.3 ENCOUNTER FOR SCREENING FOR INFECTIONS WITH A PREDOMINANTLY SEXUAL MODE OF TRANSMISSION: ICD-10-CM

## 2023-08-29 PROCEDURE — 36415 COLL VENOUS BLD VENIPUNCTURE: CPT

## 2023-08-29 PROCEDURE — 99396 PREV VISIT EST AGE 40-64: CPT | Mod: 25

## 2023-08-29 RX ORDER — HYDROCODONE BITARTRATE AND ACETAMINOPHEN 5; 325 MG/1; MG/1
5-325 TABLET ORAL
Qty: 20 | Refills: 0 | Status: DISCONTINUED | COMMUNITY
Start: 2023-07-13 | End: 2023-08-29

## 2023-08-29 RX ORDER — DIPHENHYDRAMINE HCL 25 MG
CAPSULE ORAL
Refills: 0 | Status: ACTIVE | COMMUNITY

## 2023-08-29 RX ORDER — GABAPENTIN 300 MG/1
300 CAPSULE ORAL
Qty: 30 | Refills: 0 | Status: DISCONTINUED | COMMUNITY
Start: 2023-08-03 | End: 2023-08-29

## 2023-08-29 RX ORDER — ACETAMINOPHEN 500 MG
500 TABLET ORAL
Refills: 0 | Status: ACTIVE | COMMUNITY

## 2023-08-29 RX ORDER — ERGOCALCIFEROL 1.25 MG/1
1.25 MG CAPSULE, LIQUID FILLED ORAL
Qty: 8 | Refills: 0 | Status: DISCONTINUED | COMMUNITY
Start: 2023-05-07 | End: 2023-08-29

## 2023-08-30 DIAGNOSIS — Z86.39 PERSONAL HISTORY OF OTHER ENDOCRINE, NUTRITIONAL AND METABOLIC DISEASE: ICD-10-CM

## 2023-08-30 LAB
25(OH)D3 SERPL-MCNC: 27.9 NG/ML
ALBUMIN SERPL ELPH-MCNC: 4.9 G/DL
ALP BLD-CCNC: 92 U/L
ALT SERPL-CCNC: 47 U/L
ANION GAP SERPL CALC-SCNC: 14 MMOL/L
APPEARANCE: CLEAR
AST SERPL-CCNC: 34 U/L
BASOPHILS # BLD AUTO: 0.04 K/UL
BASOPHILS NFR BLD AUTO: 0.9 %
BILIRUB SERPL-MCNC: 0.4 MG/DL
BILIRUBIN URINE: NEGATIVE
BLOOD URINE: NEGATIVE
BUN SERPL-MCNC: 14 MG/DL
CALCIUM SERPL-MCNC: 9.6 MG/DL
CHLORIDE SERPL-SCNC: 101 MMOL/L
CHOLEST SERPL-MCNC: 163 MG/DL
CO2 SERPL-SCNC: 22 MMOL/L
COLOR: YELLOW
CREAT SERPL-MCNC: 1 MG/DL
EGFR: 87 ML/MIN/1.73M2
EOSINOPHIL # BLD AUTO: 0.19 K/UL
EOSINOPHIL NFR BLD AUTO: 4.3 %
ESTIMATED AVERAGE GLUCOSE: 140 MG/DL
GLUCOSE QUALITATIVE U: NEGATIVE MG/DL
GLUCOSE SERPL-MCNC: 82 MG/DL
HBA1C MFR BLD HPLC: 6.5 %
HCT VFR BLD CALC: 46.2 %
HDLC SERPL-MCNC: 39 MG/DL
HGB BLD-MCNC: 14.9 G/DL
IMM GRANULOCYTES NFR BLD AUTO: 0.2 %
KETONES URINE: NEGATIVE MG/DL
LDLC SERPL CALC-MCNC: 97 MG/DL
LEUKOCYTE ESTERASE URINE: NEGATIVE
LYMPHOCYTES # BLD AUTO: 1.1 K/UL
LYMPHOCYTES NFR BLD AUTO: 24.9 %
MAN DIFF?: NORMAL
MCHC RBC-ENTMCNC: 28.4 PG
MCHC RBC-ENTMCNC: 32.3 GM/DL
MCV RBC AUTO: 88 FL
MONOCYTES # BLD AUTO: 0.63 K/UL
MONOCYTES NFR BLD AUTO: 14.3 %
NEUTROPHILS # BLD AUTO: 2.45 K/UL
NEUTROPHILS NFR BLD AUTO: 55.4 %
NITRITE URINE: NEGATIVE
NONHDLC SERPL-MCNC: 124 MG/DL
PH URINE: 5
PLATELET # BLD AUTO: 243 K/UL
POTASSIUM SERPL-SCNC: 4.2 MMOL/L
PROT SERPL-MCNC: 8.1 G/DL
PROTEIN URINE: NEGATIVE MG/DL
RBC # BLD: 5.25 M/UL
RBC # FLD: 13.6 %
SODIUM SERPL-SCNC: 138 MMOL/L
SPECIFIC GRAVITY URINE: 1.01
TRIGL SERPL-MCNC: 151 MG/DL
TSH SERPL-ACNC: 2.15 UIU/ML
UROBILINOGEN URINE: 0.2 MG/DL
WBC # FLD AUTO: 4.42 K/UL

## 2023-08-30 NOTE — ASSESSMENT
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  -followed by cardio, last seen 6/23 w/o med changes made, cleared for ortho procedure.  Has f/u 12/23. -home BP monitoring encouraged as able, Rx escribed prior -followed by optho, last seen 6/23- told nl dilated exam, given new glasses. To f/u yearly. -low salt diet, exercise, wt loss advised -check cmp, lipids  preDM: 5/23 A1c 6 -ADA diet, wt loss advised -check A1c  hx DONG (on CPAP), hx sarcoidosis (in remission)- -followed by pulm, Dr. Suazo - last seen 1.5 yrs ago- no changes made.  Referral to new given per request. -hx sarcoidosis dx'd 1994 on w/u of CP when noted LAD on imaging, dx'd s/p bronchoscopy with +LN bx. Told in remission > 10 yrs. -hx CT in past, told fine- unsure when last  -CPAP adherence encouraged -asked to forward records -wt loss encouraged  hx itching- chronic, unclear etiology -followed by A/I, last seen 5/23- states had negative testing and advised to continue Benadryl prn -on Benadryl use prn, ~ 2x/wk - helps -asked to forward records  hx OA in feet- hx right great toe surgery 7/17/23, reports recovering okay. -followed by ortho, last seen 8/23 with pin removed and boot use d/c'd. Advised cont PT. Has f/u 9/28/23. -doing PT 3x/wk -on Tylenol ES qod, mainly prior to PT  hx umbilical hernia- hx repair 2017, + on exam -advised of need for prompt medical eval if sx onset or not reducible  vit d def- s/p tx course -check level  obesity -BMI 37, hx intentional wt loss ~ 15 lbs with doing WW since 2/23. -risks of obesity discussed -benefits of weight loss discussed -low fat, low cholesterol, low carbohydrate diet advised -smaller portion sizes encouraged -advised avoidance of sugary drinks -aerobic exercise encouraged -weight loss advised -declines med wt loss program referral  -declines nutrition referral -check A1c, vit d  MISC:  Continued social distancing and measure for covid19 prevention encouraged.   -hx pfizer series, hx booster 10/22   HCM -check screening labs; declines STD/HIV screening -5/23 hep C screen negative -hx flu shot 9/22, yearly advised -hx Tdap 10/22 -declines shingrix vaccine -hx screening colonoscopy 5/23: polyps, diverticulosis, hemorrhoids, rec: repeat in 3 yrs. -derm referral for screening- pending.  Regular use of sun block for skin cancer prevention advised. -cont'd smoking/drug cessation and sobriety (has sponsor, attends AA prn) encouraged -yearly dental screening advised  Pt's cell: [phone]  Labs drawn in office today.

## 2023-08-30 NOTE — HEALTH RISK ASSESSMENT
[0] : 2) Feeling down, depressed, or hopeless: Not at all (0) [PHQ-2 Negative - No further assessment needed] : PHQ-2 Negative - No further assessment needed [Feels Safe at Home] : Feels safe at home [Former] : Former [HIV test declined] : HIV test declined [Smoke Detector] : smoke detector [Carbon Monoxide Detector] : carbon monoxide detector [Seat Belt] :  uses seat belt [[id number]] : 0 [Guns at Home] : no guns at home [Sunscreen] : does not use sunscreen [ColonoscopyDate] : 05/23 [ColonoscopyComments] : polyps, diverticulosis, hemorrhoids, rec: repeat in 3 yrs. [HIVDate] : 05/23 [HIVComments] : negative [HepatitisCDate] : 05/23 [HepatitisCComments] : negative

## 2023-08-30 NOTE — HISTORY OF PRESENT ILLNESS
[Spouse] : spouse [] :  [Working Full Time] : working full time [Occupation ___] : occupation: [unfilled] [Former Cigarette Smoker] : is a former cigarette smoker [Quit Cigarettes: ___] : quit smoking [unfilled] [In Recovery] : being in recovery from alcohol abuse [___ Year(s) Ago] : [unfilled] year(s) ago [Good] : good [Reg. Dental Visits] : He has regular dental visits [Healthy Diet] : He consumes a diverse and healthy diet [FreeTextEntry1] : CPE [Vision Problems] : He denies vision problems [Hearing Loss] : He denies hearing loss [Regular Exercise] : He does not exercise regularly [de-identified] : adult son [de-identified] :  1-2 ppd on/off x 10 yrs [de-identified] : since 11/21- has AA/sponsor as needed [de-identified] : cocaine snorting, denies hx IVDA- clean x 30 yrs in 11/23 [de-identified] : hx flu shot 9/22, hx Tdap 10/22, no hx shingles vaccine [de-identified] : 59 yo M pmhx seasonal allergies, preDM, HTN, HLD, CAD hx remote MI, sarcoidosis, DONG, colon polyps, diverticulosis, hemorrhoids, OA, LBP, hx drug abuse/alcoholism in recovery, obesity for CPE  Last seen 5/4/23 to establish care.  Feeling well. Does not need med refills. Fasting for labs.  Reports is socially distancing and using precautions for covid prevention- on/off. Denies sick or covid positive contacts. Denies fever, chills, cough or sob. -hx pfizer series, hx booster 10/22  hx OA in feet- had right great toe surgery 7/17/23, reports recovering okay. Ambulating w/o device.  Reports pain controlled. -followed by ortho, last seen 8/23 with pin removed and boot use d/c'd.  Advised cont PT.  Has f/u 9/28/23. -doing PT 3x/wk -on Tylenol ES qod, mainly prior to PT  HTN, CAD hx remote MI-  -carvedilol 12.5 bid (dose increased 3/23) -atorvastatin 20 mg, denies SE.  -followed by cardio, last seen 6/23 w/o med changes made, cleared for ortho procedure.  Has f/u 12/23. -followed by optho, last seen 6/23- told nl dilated exam, given new glasses.  To f/u yearly. -no home BP checks, has not picked up machine from pharmacy yet -has low salt diet -denies CP, sob, palpitations or leg swelling  Reports intentional wt loss ~ 15 lbs with doing WW since 2/23, few lbs since more sedentary s/p foot surgery 7/23 no formal exercise, is - out of work on disability since foot surgery  hx DONG- dx'd 4 yrs ago, on CPAP- using on/off -followed by Dr. Gabriele haro - last seen 1.5 yrs ago- no changes made.  Needs new provider.  hx sarcoidosis- dx'd 1994 on w/u of CP when noted LAD on imaging, dx'd s/p bronchoscopy with +LN bx.  Told in remission > 10 yrs. -hx CT in past, told fine- unsure when last  -followed by pulm  hx gen itching (no rash) of unclear etiology-  -followed by A/I, last seen 5/23- states had negative testing and advised to continue Benadryl prn -on Benadryl use prn, ~ 2x/wk - helps  Reports nl appetite and BMs. Denies GI complaints. -hx colonoscopy 5/23: polyps, diverticulosis, hemorrhoids, rec: repeat in 3 yrs.  hx vit d def- s/p tx course, not on OTC supp  Denies  complaints.  Denies depression or anxiety.

## 2023-08-30 NOTE — PHYSICAL EXAM
[No Acute Distress] : no acute distress [Well-Appearing] : well-appearing [Normal Sclera/Conjunctiva] : normal sclera/conjunctiva [PERRL] : pupils equal round and reactive to light [EOMI] : extraocular movements intact [Normal Outer Ear/Nose] : the outer ears and nose were normal in appearance [Normal Oropharynx] : the oropharynx was normal [Normal TMs] : both tympanic membranes were normal [Normal Nasal Mucosa] : the nasal mucosa was normal [No Lymphadenopathy] : no lymphadenopathy [Supple] : supple [Thyroid Normal, No Nodules] : the thyroid was normal and there were no nodules present [No Respiratory Distress] : no respiratory distress  [Clear to Auscultation] : lungs were clear to auscultation bilaterally [Normal Rate] : normal rate  [Regular Rhythm] : with a regular rhythm [Normal S1, S2] : normal S1 and S2 [No Murmur] : no murmur heard [Pedal Pulses Present] : the pedal pulses are present [No Edema] : there was no peripheral edema [Soft] : abdomen soft [Non Tender] : non-tender [Non-distended] : non-distended [No Masses] : no abdominal mass palpated [No HSM] : no HSM [Normal Supraclavicular Nodes] : no supraclavicular lymphadenopathy [Normal Posterior Cervical Nodes] : no posterior cervical lymphadenopathy [Normal Anterior Cervical Nodes] : no anterior cervical lymphadenopathy [No CVA Tenderness] : no CVA  tenderness [No Spinal Tenderness] : no spinal tenderness [No Joint Swelling] : no joint swelling [Grossly Normal Strength/Tone] : grossly normal strength/tone [No Rash] : no rash [No Focal Deficits] : no focal deficits [Normal Gait] : normal gait [Normal Affect] : the affect was normal [Alert and Oriented x3] : oriented to person, place, and time [de-identified] : +ventral hernia, reducible, nontender [de-identified] : scattered freckles, right great toe: well healing incision site w/o inflammation

## 2023-09-14 ENCOUNTER — APPOINTMENT (OUTPATIENT)
Dept: PULMONOLOGY | Facility: CLINIC | Age: 58
End: 2023-09-14
Payer: COMMERCIAL

## 2023-09-14 VITALS
HEART RATE: 82 BPM | RESPIRATION RATE: 18 BRPM | SYSTOLIC BLOOD PRESSURE: 150 MMHG | WEIGHT: 220 LBS | DIASTOLIC BLOOD PRESSURE: 87 MMHG | BODY MASS INDEX: 36.65 KG/M2 | OXYGEN SATURATION: 96 % | HEIGHT: 65 IN

## 2023-09-14 PROCEDURE — 94729 DIFFUSING CAPACITY: CPT

## 2023-09-14 PROCEDURE — 71046 X-RAY EXAM CHEST 2 VIEWS: CPT

## 2023-09-14 PROCEDURE — 99204 OFFICE O/P NEW MOD 45 MIN: CPT | Mod: 25

## 2023-09-14 PROCEDURE — 94010 BREATHING CAPACITY TEST: CPT

## 2023-09-14 PROCEDURE — 94726 PLETHYSMOGRAPHY LUNG VOLUMES: CPT

## 2023-09-28 ENCOUNTER — APPOINTMENT (OUTPATIENT)
Dept: ORTHOPEDIC SURGERY | Facility: CLINIC | Age: 58
End: 2023-09-28
Payer: COMMERCIAL

## 2023-09-28 PROCEDURE — 99024 POSTOP FOLLOW-UP VISIT: CPT

## 2023-11-22 ENCOUNTER — APPOINTMENT (OUTPATIENT)
Dept: ORTHOPEDIC SURGERY | Facility: CLINIC | Age: 58
End: 2023-11-22
Payer: COMMERCIAL

## 2023-11-22 ENCOUNTER — RX RENEWAL (OUTPATIENT)
Age: 58
End: 2023-11-22

## 2023-11-22 VITALS — BODY MASS INDEX: 36.65 KG/M2 | WEIGHT: 220 LBS | HEIGHT: 65 IN

## 2023-11-22 DIAGNOSIS — M25.562 PAIN IN LEFT KNEE: ICD-10-CM

## 2023-11-22 DIAGNOSIS — M17.11 UNILATERAL PRIMARY OSTEOARTHRITIS, RIGHT KNEE: ICD-10-CM

## 2023-11-22 DIAGNOSIS — G89.29 PAIN IN LEFT KNEE: ICD-10-CM

## 2023-11-22 PROCEDURE — 99213 OFFICE O/P EST LOW 20 MIN: CPT

## 2023-11-22 PROCEDURE — 73564 X-RAY EXAM KNEE 4 OR MORE: CPT | Mod: 50

## 2023-11-22 RX ORDER — ATORVASTATIN CALCIUM 20 MG/1
20 TABLET, FILM COATED ORAL
Qty: 90 | Refills: 1 | Status: ACTIVE | COMMUNITY
Start: 2021-02-17 | End: 1900-01-01

## 2023-11-29 ENCOUNTER — APPOINTMENT (OUTPATIENT)
Dept: ORTHOPEDIC SURGERY | Facility: CLINIC | Age: 58
End: 2023-11-29
Payer: COMMERCIAL

## 2023-11-29 VITALS — WEIGHT: 220 LBS | BODY MASS INDEX: 36.65 KG/M2 | HEIGHT: 65 IN

## 2023-11-29 DIAGNOSIS — M20.21 HALLUX RIGIDUS, RIGHT FOOT: ICD-10-CM

## 2023-11-29 DIAGNOSIS — Q74.2 OTHER CONGENITAL MALFORMATIONS OF LOWER LIMB(S), INCLUDING PELVIC GIRDLE: ICD-10-CM

## 2023-11-29 PROCEDURE — 99213 OFFICE O/P EST LOW 20 MIN: CPT

## 2023-12-07 ENCOUNTER — RESULT REVIEW (OUTPATIENT)
Age: 58
End: 2023-12-07

## 2023-12-07 ENCOUNTER — OUTPATIENT (OUTPATIENT)
Dept: OUTPATIENT SERVICES | Facility: HOSPITAL | Age: 58
LOS: 1 days | End: 2023-12-07
Payer: COMMERCIAL

## 2023-12-07 DIAGNOSIS — M54.5 LOW BACK PAIN: Chronic | ICD-10-CM

## 2023-12-07 DIAGNOSIS — M17.12 UNILATERAL PRIMARY OSTEOARTHRITIS, LEFT KNEE: ICD-10-CM

## 2023-12-07 DIAGNOSIS — Z96.611 PRESENCE OF RIGHT ARTIFICIAL SHOULDER JOINT: Chronic | ICD-10-CM

## 2023-12-07 DIAGNOSIS — Z98.890 OTHER SPECIFIED POSTPROCEDURAL STATES: Chronic | ICD-10-CM

## 2023-12-07 DIAGNOSIS — M25.562 PAIN IN LEFT KNEE: ICD-10-CM

## 2023-12-07 PROCEDURE — 73721 MRI JNT OF LWR EXTRE W/O DYE: CPT

## 2023-12-07 PROCEDURE — 73721 MRI JNT OF LWR EXTRE W/O DYE: CPT | Mod: 26,LT

## 2023-12-08 ENCOUNTER — APPOINTMENT (OUTPATIENT)
Dept: CARDIOLOGY | Facility: CLINIC | Age: 58
End: 2023-12-08
Payer: COMMERCIAL

## 2023-12-08 ENCOUNTER — NON-APPOINTMENT (OUTPATIENT)
Age: 58
End: 2023-12-08

## 2023-12-08 VITALS
OXYGEN SATURATION: 97 % | BODY MASS INDEX: 36.65 KG/M2 | DIASTOLIC BLOOD PRESSURE: 100 MMHG | WEIGHT: 220 LBS | SYSTOLIC BLOOD PRESSURE: 130 MMHG | HEIGHT: 65 IN | HEART RATE: 97 BPM

## 2023-12-08 VITALS — SYSTOLIC BLOOD PRESSURE: 126 MMHG | DIASTOLIC BLOOD PRESSURE: 86 MMHG

## 2023-12-08 DIAGNOSIS — R94.31 ABNORMAL ELECTROCARDIOGRAM [ECG] [EKG]: ICD-10-CM

## 2023-12-08 PROCEDURE — 99215 OFFICE O/P EST HI 40 MIN: CPT

## 2023-12-08 PROCEDURE — 93000 ELECTROCARDIOGRAM COMPLETE: CPT

## 2023-12-10 LAB
ALBUMIN SERPL ELPH-MCNC: 4.2 G/DL
ALP BLD-CCNC: 93 U/L
ALT SERPL-CCNC: 27 U/L
ANION GAP SERPL CALC-SCNC: 11 MMOL/L
APTT BLD: 33.1 SEC
AST SERPL-CCNC: 16 U/L
BILIRUB SERPL-MCNC: 0.3 MG/DL
BUN SERPL-MCNC: 14 MG/DL
CALCIUM SERPL-MCNC: 9 MG/DL
CHLORIDE SERPL-SCNC: 105 MMOL/L
CO2 SERPL-SCNC: 23 MMOL/L
CREAT SERPL-MCNC: 1.02 MG/DL
EGFR: 85 ML/MIN/1.73M2
GLUCOSE SERPL-MCNC: 139 MG/DL
HCT VFR BLD CALC: 45.8 %
HGB BLD-MCNC: 14.9 G/DL
INR PPP: 1 RATIO
MCHC RBC-ENTMCNC: 28.2 PG
MCHC RBC-ENTMCNC: 32.5 GM/DL
MCV RBC AUTO: 86.6 FL
PLATELET # BLD AUTO: 260 K/UL
POTASSIUM SERPL-SCNC: 4.5 MMOL/L
PROT SERPL-MCNC: 6.9 G/DL
PT BLD: 11.3 SEC
RBC # BLD: 5.29 M/UL
RBC # FLD: 12.9 %
SODIUM SERPL-SCNC: 139 MMOL/L
WBC # FLD AUTO: 5 K/UL

## 2023-12-12 ENCOUNTER — APPOINTMENT (OUTPATIENT)
Dept: INTERNAL MEDICINE | Facility: CLINIC | Age: 58
End: 2023-12-12
Payer: COMMERCIAL

## 2023-12-12 VITALS
WEIGHT: 231 LBS | SYSTOLIC BLOOD PRESSURE: 122 MMHG | TEMPERATURE: 97.6 F | HEART RATE: 94 BPM | DIASTOLIC BLOOD PRESSURE: 84 MMHG | BODY MASS INDEX: 38.49 KG/M2 | HEIGHT: 65 IN | RESPIRATION RATE: 16 BRPM | OXYGEN SATURATION: 96 %

## 2023-12-12 DIAGNOSIS — Z23 ENCOUNTER FOR IMMUNIZATION: ICD-10-CM

## 2023-12-12 DIAGNOSIS — F43.21 ADJUSTMENT DISORDER WITH DEPRESSED MOOD: ICD-10-CM

## 2023-12-12 DIAGNOSIS — D86.9 SARCOIDOSIS, UNSPECIFIED: ICD-10-CM

## 2023-12-12 DIAGNOSIS — R07.89 OTHER CHEST PAIN: ICD-10-CM

## 2023-12-12 DIAGNOSIS — L71.9 ROSACEA, UNSPECIFIED: ICD-10-CM

## 2023-12-12 PROCEDURE — 99214 OFFICE O/P EST MOD 30 MIN: CPT

## 2023-12-13 ENCOUNTER — APPOINTMENT (OUTPATIENT)
Dept: ORTHOPEDIC SURGERY | Facility: CLINIC | Age: 58
End: 2023-12-13
Payer: COMMERCIAL

## 2023-12-13 PROCEDURE — 99214 OFFICE O/P EST MOD 30 MIN: CPT

## 2023-12-13 RX ORDER — HYALURONATE SODIUM 20 MG/2 ML
20 SYRINGE (ML) INTRAARTICULAR
Qty: 3 | Refills: 0 | Status: ACTIVE | COMMUNITY
Start: 2023-12-13 | End: 1900-01-01

## 2023-12-13 NOTE — DATA REVIEWED
[Left] : left [Knee] : knee [Report was reviewed and noted in the chart] : The report was reviewed and noted in the chart [I independently reviewed and interpreted images and report] : I independently reviewed and interpreted images and report [I reviewed the films/CD] : I reviewed the films/CD [FreeTextEntry1] : mmt, fraying lateral meniscus. tricompartmental djd

## 2023-12-13 NOTE — PHYSICAL EXAM
[Able to Communicate] : able to communicate [Well Developed] : well developed [Well Nourished] : well nourished [Left] : left knee [NL (0)] : extension 0 degrees [Right] : right knee [Tunnels and hardware in proper position] : Tunnels and hardware in proper position [AP] : anteroposterior [Lateral] : lateral [[place]] : skyline [AP Standing] : anteroposterior standing [Mild tricompartmental OA medial narrowing] : Mild tricompartmental OA medial narrowing [] : no extensor lag [TWNoteComboBox7] : flexion 135 degrees

## 2023-12-13 NOTE — DISCUSSION/SUMMARY
[de-identified] : 58m with left knee mmt and djd. Reviewed MRI results, questions answered 1) discussed availability of visco injections and pt would like to proceed. will request auth for a series of injections.  2) hep, cryotherapy, rest and activity modification 3) rtc with auth for injections  Entered by Marilin Weeks acting as scribe. Dr. Oakes- The documentation recorded by the scribe accurately reflects the service I personally performed and the decisions made by me.

## 2023-12-13 NOTE — HISTORY OF PRESENT ILLNESS
[de-identified] : 12/13/23: here to f/up b/l knee and review MRI results for left knee 11/22/23: here to f/up b/l knees. continues to have knee pain. Had surgery on both feet by Dr. Mathews, most recent was R foot (7/23). He has worsening pain knees, pain is constant left knee and intermittent on the right, shooting in nature.  He has pain qhs. Reports CSI last visit without relief.  Pain meds for his foot surgery were not helpful for his knees. NO relief with Tylenol. He has still needs 2 additional surgeries with Dr. Mathews for his feet. In too much pain knees to wait until those are done. Occupation:  [employer].  11/30/22: Here to f/up b/l knees. CSI at last visit with temporary relief.  11/2/22: here to f/up b/l knees. Reports continued b/l knee pain and stiffness.  5/18/22: Mr. Scot Aviles, a 56-year-old male c/o B/L knee pain. Chronic for years. Pain has recently increased. c/o constant pain that gets better with rest and instability/buckling. Hx of right knee ACL recon and meniscus repair. No treatment to the left. Jay at [facility].

## 2023-12-18 RX ORDER — MECLIZINE HCL 12.5 MG
1 TABLET ORAL
Qty: 0 | Refills: 0 | DISCHARGE

## 2023-12-18 NOTE — H&P ADULT - NSHPLABSRESULTS_GEN_ALL_CORE
Nuclear stress test  revealed small mild to moderate defects in the basal inferior and basal septal walls that are fixed suggestive of scar 12/8/23 Nuclear stress test  revealed small mild to moderate defects in the basal inferior and basal septal walls that are fixed suggestive of scar    12/8/23 EKG SR 96

## 2023-12-18 NOTE — H&P ADULT - PROBLEM SELECTOR PLAN 1
Pt attended cardiac rehab phase II exercise session.  Exercise and vitals documented in [facility] Monitor System.      a/w CP   -plan for cardiac cath for ischemic work up  - [place] protocol pre hydration: NS 250cc IV bolus x1   -Consent obtained for cardiac catheterization w/ coronary angiogram and possible stent placement, with possible sedation and analgia. Pt is competent, has capacity, and understands risks and benefits of procedure. Risks and benefits discussed. Risk discussed included, but not limited to MI, stroke, mortality, major bleeding, arrythmia, or infection. All questions answered

## 2023-12-18 NOTE — ASU PATIENT PROFILE, ADULT - FALL HARM RISK - UNIVERSAL INTERVENTIONS
Bed in lowest position, wheels locked, appropriate side rails in place/Call bell, personal items and telephone in reach/Instruct patient to call for assistance before getting out of bed or chair/Non-slip footwear when patient is out of bed/[place] to call system/Physically safe environment - no spills, clutter or unnecessary equipment/Purposeful Proactive Rounding/Room/bathroom lighting operational, light cord in reach Bed in lowest position, wheels locked, appropriate side rails in place/Call bell, personal items and telephone in reach/Instruct patient to call for assistance before getting out of bed or chair/Non-slip footwear when patient is out of bed/[place] to call system/Physically safe environment - no spills, clutter or unnecessary equipment/Purposeful Proactive Rounding/Room/bathroom lighting operational, light cord in reach

## 2023-12-18 NOTE — H&P ADULT - ASSESSMENT
58yr old male PMH HTN, HLD, MI, CAD, GERD, sarcoidosis, obstructive sleep apnea with c/o not feeling well for the past 2 weeks. Pt. describes an uncomfortable sensation in his chest and left shoulder that occurs sporadically. Pt. had a stress test which revealed small mild to moderate defects in the basal inferior and basal septal walls that are fixed suggestive of scar   Consent obtained for and signed for cardiac cath with coronary angiogram and possible stent placement with possible sedation and analgesia. [facility] risks, benefits and alternatives discussed with patient. Risk discussed included, but not limited to MI, stroke, mortality, major bleeding, arrythmia, or infection, educational material provided. Pt. verbalizes and understands pre-procedural instructions.   ASA:  Bleeding Risk Score:  Creatinine:  GFR:    Kirt Score: 58yr old male PMH HTN, HLD, MI, CAD, GERD, sarcoidosis, obstructive sleep apnea with c/o not feeling well for the past 2 weeks. Pt. describes an uncomfortable sensation in his chest and left shoulder that occurs sporadically. Pt. had a stress test which revealed small mild to moderate defects in the basal inferior and basal septal walls that are fixed suggestive of scar   Consent obtained for and signed for cardiac cath with coronary angiogram and possible stent placement with possible sedation and analgesia. [facility] risks, benefits and alternatives discussed with patient. Risk discussed included, but not limited to MI, stroke, mortality, major bleeding, arrythmia, or infection, educational material provided. Pt. verbalizes and understands pre-procedural instructions.   ASA:  Bleeding Risk Score:  Creatinine:  GFR:    Kirt Score: 58yr old male PMH HTN, HLD, MI, CAD, GERD, sarcoidosis, obstructive sleep apnea with c/o not feeling well for the past 2 weeks. Pt. describes an uncomfortable sensation in his chest and left shoulder that occurs sporadically. Pt. had a stress test which revealed small mild to moderate defects in the basal inferior and basal septal walls that are fixed suggestive of scar           ASA class: II   Creatinine: 1.02  GFR: 85  Bleeding  Risk score: 0.7  Kirt Score: 1

## 2023-12-18 NOTE — H&P ADULT - HISTORY OF PRESENT ILLNESS
58yr old male PMH HTN, HLD, MI, CAD, GERD, sarcoidosis, obstructive sleep apnea with c/o not feeling well for the past 2 weeks. Pt. describes an uncomfortable sensation in his chest and left shoulder that occurs sporadically. Pt. had a stress test which revealed small mild to moderate defects in the basal inferior and basal septal walls that are fixed suggestive of scar  58yr old male PMH HTN, HLD, MI, CAD, GERD, sarcoidosis, obstructive sleep apnea with c/o not feeling well for the past 2 weeks, uncomfortable sensation in his chest and left shoulder that occurs sporadically. Pt. had a stress test which revealed small mild to moderate defects in the basal inferior and basal septal walls that are fixed suggestive of scar

## 2023-12-18 NOTE — H&P ADULT - PROBLEM/PLAN-1
Spoke with mom, patient c/o bad ST x 2 days, slight fatigue. Afebrile and otherwise well. Patient has school aversion, however, mom believes ST is real. Appt scheduled.   DISPLAY PLAN FREE TEXT

## 2023-12-19 ENCOUNTER — APPOINTMENT (OUTPATIENT)
Dept: CARDIOLOGY | Facility: CLINIC | Age: 58
End: 2023-12-19

## 2023-12-19 ENCOUNTER — OUTPATIENT (OUTPATIENT)
Dept: OUTPATIENT SERVICES | Facility: HOSPITAL | Age: 58
LOS: 1 days | Discharge: HOME | End: 2023-12-19
Payer: COMMERCIAL

## 2023-12-19 VITALS
DIASTOLIC BLOOD PRESSURE: 97 MMHG | TEMPERATURE: 98 F | OXYGEN SATURATION: 96 % | HEIGHT: 65 IN | SYSTOLIC BLOOD PRESSURE: 147 MMHG | WEIGHT: 225.09 LBS | RESPIRATION RATE: 16 BRPM | HEART RATE: 77 BPM

## 2023-12-19 VITALS
DIASTOLIC BLOOD PRESSURE: 82 MMHG | SYSTOLIC BLOOD PRESSURE: 135 MMHG | RESPIRATION RATE: 18 BRPM | OXYGEN SATURATION: 99 % | HEART RATE: 75 BPM

## 2023-12-19 DIAGNOSIS — M54.5 LOW BACK PAIN: Chronic | ICD-10-CM

## 2023-12-19 DIAGNOSIS — R07.89 OTHER CHEST PAIN: ICD-10-CM

## 2023-12-19 DIAGNOSIS — Z98.890 OTHER SPECIFIED POSTPROCEDURAL STATES: Chronic | ICD-10-CM

## 2023-12-19 DIAGNOSIS — R94.39 ABNORMAL RESULT OF OTHER CARDIOVASCULAR FUNCTION STUDY: ICD-10-CM

## 2023-12-19 DIAGNOSIS — Z96.611 PRESENCE OF RIGHT ARTIFICIAL SHOULDER JOINT: Chronic | ICD-10-CM

## 2023-12-19 PROCEDURE — C1894: CPT

## 2023-12-19 PROCEDURE — C1769: CPT

## 2023-12-19 PROCEDURE — 93458 L HRT ARTERY/VENTRICLE ANGIO: CPT

## 2023-12-19 PROCEDURE — 93458 L HRT ARTERY/VENTRICLE ANGIO: CPT | Mod: 26

## 2023-12-19 PROCEDURE — C1887: CPT

## 2023-12-19 PROCEDURE — 99152 MOD SED SAME PHYS/QHP 5/>YRS: CPT

## 2023-12-19 RX ORDER — SODIUM CHLORIDE 9 MG/ML
250 INJECTION INTRAMUSCULAR; INTRAVENOUS; SUBCUTANEOUS ONCE
Refills: 0 | Status: COMPLETED | OUTPATIENT
Start: 2023-12-19 | End: 2023-12-19

## 2023-12-19 RX ORDER — CARVEDILOL PHOSPHATE 80 MG/1
1 CAPSULE, EXTENDED RELEASE ORAL
Refills: 0 | DISCHARGE

## 2023-12-19 RX ORDER — ASPIRIN/CALCIUM CARB/MAGNESIUM 324 MG
1 TABLET ORAL
Refills: 0 | DISCHARGE

## 2023-12-19 RX ORDER — ATORVASTATIN CALCIUM 80 MG/1
1 TABLET, FILM COATED ORAL
Refills: 0 | DISCHARGE

## 2023-12-19 RX ADMIN — SODIUM CHLORIDE 250 MILLILITER(S): 9 INJECTION INTRAMUSCULAR; INTRAVENOUS; SUBCUTANEOUS at 11:42

## 2023-12-19 NOTE — PROGRESS NOTE ADULT - SUBJECTIVE AND OBJECTIVE BOX
Department of Cardiology                                                               Division of Interventional Cardiology                                                               [facility] /[address] [address]                                                                                 [phone]           Post Procedure Progress Note    HPI:  58yr old male PMH HTN, HLD, MI, CAD, GERD, sarcoidosis, obstructive sleep apnea with c/o not feeling well for the past 2 weeks, uncomfortable sensation in his chest and left shoulder that occurs sporadically. Pt. had a stress test which revealed small mild to moderate defects in the basal inferior and basal septal walls that are fixed suggestive of scar  (18 Dec 2023 15:29)          Vital Signs  T(C): 36.7 (12-19-23 @ 09:12), Max: 36.7 (12-19-23 @ 09:12)  HR: 73 (12-19-23 @ 11:50) (71 - 77)  BP: 135/92 (12-19-23 @ 11:40) (132/95 - 147/97)  RR: 16 (12-19-23 @ 11:50) (16 - 18)  SpO2: 100% (12-19-23 @ 11:50) (95% - 100%)  Wt(kg): --        PHYSICAL EXAM:  NEURO: Non-focal, AxOx3.  No neuro deficits   CHEST/LUNG: Clear to auscultation bilaterally; No wheeze  HEART: s1 s2 Regular rate and rhythm; No murmurs, rubs, or gallops  ABDOMEN: Soft, Nontender, Nondistended; Bowel sounds present X 4 quadrants   EXTREMITIES:  2+ Peripheral Pulses, No clubbing, cyanosis, or edema   VASCULAR: Peripheral pulses palpable 2+ bilaterally  PROCEDURE SITE: --RRA accessed, hemoband to be removed 1 hour post placement- Site is without hematoma or bleeding. Sensation and SANTO intact. Distal pulses palpable 2+, capillary refill < 2 seconds. Patient denies pain, numbness, tingling, CP or SOB. Clean dry dressing applied     PROCEDURE RESULTS: full report to follow       s/p LHC : revealing non obs CAD   Pt denies chest pain/SOB/palpitations post cath.      PLAN:  -VS, diet, activity as per post cath orders  - IVF per [facility] protocol   -Encourage PO fluids  -Continue current medications  -Post cath instructions reviewed, post sedation instructions reviewed; patient verbalizes and understands instructions  -Discussed therapeutic lifestyle changes to reduce risk factors such as following a cardiac diet, weight loss, maintaining a healthy weight, exercise, smoking cessation, medication compliance, and regular follow-up  with PCP/Cardioloigst  - Patient to be discharged home, recommend following up with cardiologist in 2 weeks  -Plan of care discussed with patient, RN and Dr. Roberts           Department of Cardiology                                                               Division of Interventional Cardiology                                                               [facility] /[address] [address]                                                                                 [phone]           Post Procedure Progress Note    HPI:  58yr old male PMH HTN, HLD, MI, CAD, GERD, sarcoidosis, obstructive sleep apnea with c/o not feeling well for the past 2 weeks, uncomfortable sensation in his chest and left shoulder that occurs sporadically. Pt. had a stress test which revealed small mild to moderate defects in the basal inferior and basal septal walls that are fixed suggestive of scar  (18 Dec 2023 15:29)          Vital Signs  T(C): 36.7 (12-19-23 @ 09:12), Max: 36.7 (12-19-23 @ 09:12)  HR: 73 (12-19-23 @ 11:50) (71 - 77)  BP: 135/92 (12-19-23 @ 11:40) (132/95 - 147/97)  RR: 16 (12-19-23 @ 11:50) (16 - 18)  SpO2: 100% (12-19-23 @ 11:50) (95% - 100%)  Wt(kg): --        PHYSICAL EXAM:  NEURO: Non-focal, AxOx3.  No neuro deficits   CHEST/LUNG: Clear to auscultation bilaterally; No wheeze  HEART: s1 s2 Regular rate and rhythm; No murmurs, rubs, or gallops  ABDOMEN: Soft, Nontender, Nondistended; Bowel sounds present X 4 quadrants   EXTREMITIES:  2+ Peripheral Pulses, No clubbing, cyanosis, or edema   VASCULAR: Peripheral pulses palpable 2+ bilaterally  PROCEDURE SITE: --RRA accessed, hemoband to be removed 1 hour post placement- Site is without hematoma or bleeding. Sensation and SANTO intact. Distal pulses palpable 2+, capillary refill < 2 seconds. Patient denies pain, numbness, tingling, CP or SOB. Clean dry dressing applied     PROCEDURE RESULTS: full report to follow       s/p LHC : revealing non obs CAD   Pt denies chest pain/SOB/palpitations post cath.      PLAN:  -VS, diet, activity as per post cath orders  - IVF per [facility] protocol   -Encourage PO fluids  -Continue current medications  -Post cath instructions reviewed, post sedation instructions reviewed; patient verbalizes and understands instructions  -Discussed therapeutic lifestyle changes to reduce risk factors such as following a cardiac diet, weight loss, maintaining a healthy weight, exercise, smoking cessation, medication compliance, and regular follow-up  with PCP/Cardioloigst  - Patient to be discharged home, recommend following up with cardiologist in 2 weeks  -Plan of care discussed with patient, RN and Dr. Roberts

## 2023-12-19 NOTE — PACU DISCHARGE NOTE - COMMENTS
pt is s/p LHC via RRA. pt is aaox4, denies pain, discomfort, palpitations, SOB, dizziness. RUE is warm and mobile w no s/sx bleeding or hematoma. discharge instructions reviewed w pt including medication reconciliation, follow up appts and post catheterization precautions. pt verbalizes understanding of information and provides teachback. all questions answered to pt satisfaction. IVL removed 20# from L AC. pt is pending transport to [facility] to care of wife. pt is s/p LHC via RRA. pt is aaox4, denies pain, discomfort, palpitations, SOB, dizziness. RUE is warm and mobile w no s/sx bleeding or hematoma. discharge instructions reviewed w pt including medication reconciliation, follow up appts and post catheterization precautions. pt verbalizes understanding of information and provides teachback. all questions answered to pt satisfaction. IVL removed 20# from L AC. pt is pending transport to [facility] to care of wife.

## 2023-12-20 ENCOUNTER — APPOINTMENT (OUTPATIENT)
Dept: ORTHOPEDIC SURGERY | Facility: CLINIC | Age: 58
End: 2023-12-20
Payer: COMMERCIAL

## 2023-12-20 PROCEDURE — 20611 DRAIN/INJ JOINT/BURSA W/US: CPT | Mod: LT

## 2023-12-20 PROCEDURE — 99213 OFFICE O/P EST LOW 20 MIN: CPT | Mod: 25

## 2023-12-20 NOTE — DISCUSSION/SUMMARY
[de-identified] : 58m with left knee mmt and djd. discussed availability of visco injections and pt would like to proceed.  Euflexxa #1 left knee. Injection tolerated well. Post injection instructions reviewed. 1) wbat, cryotherapy 2) rtc 1 week  Entered by Marilin Weeks acting as scribe. Dr. Oakes- The documentation recorded by the scribe accurately reflects the service I personally performed and the decisions made by me.

## 2023-12-20 NOTE — HISTORY OF PRESENT ILLNESS
[de-identified] : 12/20/23: Here to f/up left knee and to consider visco injections.  12/13/23: here to f/up b/l knee and review MRI results for left knee 11/22/23: here to f/up b/l knees. continues to have knee pain. Had surgery on both feet by Dr. Mathews, most recent was R foot (7/23). He has worsening pain knees, pain is constant left knee and intermittent on the right, shooting in nature.  He has pain qhs. Reports CSI last visit without relief.  Pain meds for his foot surgery were not helpful for his knees. NO relief with Tylenol. He has still needs 2 additional surgeries with Dr. Mathews for his feet. In too much pain knees to wait until those are done. Occupation:  [employer].  11/30/22: Here to f/up b/l knees. CSI at last visit with temporary relief.  11/2/22: here to f/up b/l knees. Reports continued b/l knee pain and stiffness.  5/18/22: Mr. Scot Aviles, a 56-year-old male c/o B/L knee pain. Chronic for years. Pain has recently increased. c/o constant pain that gets better with rest and instability/buckling. Hx of right knee ACL recon and meniscus repair. No treatment to the left. Jay at [facility].

## 2023-12-20 NOTE — PROCEDURE
[FreeTextEntry3] : Large joint injection was performed  of the left knee. The indication for this procedure was x-ray evidence of Osteoarthritis on this or prior visit. The site was prepped with alcohol and betadine. An injection of Lidocaine 3cc of 1% , Euflexxa 2ml, # 1  was used.   Patient was advised to call if redness, pain or fever occur and apply ice for 15 minutes out of every hour for the next 12-24 hours as tolerated.   Patient has tried OTC's including aspirin, Ibuprofen, Aleve, etc or prescription NSAIDS, and/or exercises at home and/or physical therapy without satisfactory response, patient had decreased mobility in the joint and the risks benefits, and alternatives have been discussed, and verbal consent was obtained.   The risks, benefits and contents of the injection have been discussed.  Risks include but are not limited to allergic reaction, flare reaction, permanent white skin discoloration at the injection site and infection.  The patient understands the risks and agrees to having the injection.  All questions have been answered.  Ultrasound guidance was indicated for this patient due to prior failure or difficult injection.

## 2023-12-20 NOTE — POST DISCHARGE NOTE - DETAILS:
Post procedure phone call completed; patient understood all discharge paperwork. No questions regarding medications or pain management. MD follow up appointment made. Patient was able to rest when they were discharged. Patient will recommend [facility], satisfied with care. Only complaint was no one updated his wife after procedure. Instructed patient to contact provider with any further questions or concerns. Post procedure phone call completed; patient understood all discharge paperwork. No questions regarding medications or pain management. MD follow up appointment made. Patient was able to rest when they were discharged. Patient will recommend [facility], satisfied with care. Only complaint was no one updated his wife after procedure. Instructed patient to contact provider with any further questions or concerns.

## 2023-12-20 NOTE — PHYSICAL EXAM
[Able to Communicate] : able to communicate [Well Developed] : well developed [Well Nourished] : well nourished [Left] : left knee [NL (0)] : extension 0 degrees [Right] : right knee [Tunnels and hardware in proper position] : Tunnels and hardware in proper position [AP] : anteroposterior [Lateral] : lateral [[place]] : skyline [AP Standing] : anteroposterior standing [Mild tricompartmental OA medial narrowing] : Mild tricompartmental OA medial narrowing [] : no extensor lag [TWNoteComboBox7] : flexion 135 degrees

## 2023-12-21 DIAGNOSIS — R94.39 ABNORMAL RESULT OF OTHER CARDIOVASCULAR FUNCTION STUDY: ICD-10-CM

## 2023-12-21 DIAGNOSIS — I10 ESSENTIAL (PRIMARY) HYPERTENSION: ICD-10-CM

## 2023-12-21 DIAGNOSIS — I25.10 ATHEROSCLEROTIC HEART DISEASE OF NATIVE CORONARY ARTERY WITHOUT ANGINA PECTORIS: ICD-10-CM

## 2023-12-29 ENCOUNTER — APPOINTMENT (OUTPATIENT)
Dept: ORTHOPEDIC SURGERY | Facility: CLINIC | Age: 58
End: 2023-12-29
Payer: COMMERCIAL

## 2023-12-29 VITALS — WEIGHT: 231 LBS | BODY MASS INDEX: 38.49 KG/M2 | HEIGHT: 65 IN

## 2023-12-29 PROCEDURE — 99212 OFFICE O/P EST SF 10 MIN: CPT | Mod: 25

## 2023-12-29 PROCEDURE — 20611 DRAIN/INJ JOINT/BURSA W/US: CPT | Mod: LT

## 2023-12-29 NOTE — DISCUSSION/SUMMARY
[de-identified] : 58m with left knee mmt and djd. discussed availability of visco injections and pt would like to proceed.  Euflexxa #2 left knee. Injection tolerated well. Post injection instructions reviewed. 1) wbat, cryotherapy 2) rtc 1 week  Entered by Monica Hernandez acting as scribe. Dr. Oakes- The documentation recorded by the scribe accurately reflects the service I personally performed and the decisions made by me.

## 2023-12-29 NOTE — HISTORY OF PRESENT ILLNESS
[2] : 2 [Euflexxa] : Euflexxa [de-identified] : 12/29/23: Here to f/up left knee and for Euflexxa #2  12/20/23: Here to f/up left knee and to consider visco injections.  12/13/23: here to f/up b/l knee and review MRI results for left knee 11/22/23: here to f/up b/l knees. continues to have knee pain. Had surgery on both feet by Dr. Mathews, most recent was R foot (7/23). He has worsening pain knees, pain is constant left knee and intermittent on the right, shooting in nature.  He has pain qhs. Reports CSI last visit without relief.  Pain meds for his foot surgery were not helpful for his knees. NO relief with Tylenol. He has still needs 2 additional surgeries with Dr. Mathews for his feet. In too much pain knees to wait until those are done. Occupation:  [employer].  11/30/22: Here to f/up b/l knees. CSI at last visit with temporary relief.  11/2/22: here to f/up b/l knees. Reports continued b/l knee pain and stiffness.  5/18/22: Mr. Scot Avilse, a 56-year-old male c/o B/L knee pain. Chronic for years. Pain has recently increased. c/o constant pain that gets better with rest and instability/buckling. Hx of right knee ACL recon and meniscus repair. No treatment to the left.  at [facility].  [] : no

## 2023-12-29 NOTE — PHYSICAL EXAM
[Able to Communicate] : able to communicate [Well Developed] : well developed [Well Nourished] : well nourished [Left] : left knee [NL (0)] : extension 0 degrees [Right] : right knee [Tunnels and hardware in proper position] : Tunnels and hardware in proper position [AP] : anteroposterior [Lateral] : lateral [[place]] : skyline [AP Standing] : anteroposterior standing [Mild tricompartmental OA medial narrowing] : Mild tricompartmental OA medial narrowing [] : no extensor lag [TWNoteComboBox7] : flexion 135 degrees

## 2023-12-29 NOTE — PROCEDURE
[FreeTextEntry3] : Large joint injection was performed  of the left knee. The indication for this procedure was x-ray evidence of Osteoarthritis on this or prior visit. The site was prepped with alcohol and betadine. An injection of Lidocaine 3cc of 1% , Euflexxa 2ml, # 2  was used.   Patient was advised to call if redness, pain or fever occur and apply ice for 15 minutes out of every hour for the next 12-24 hours as tolerated.   Patient has tried OTC's including aspirin, Ibuprofen, Aleve, etc or prescription NSAIDS, and/or exercises at home and/or physical therapy without satisfactory response, patient had decreased mobility in the joint and the risks benefits, and alternatives have been discussed, and verbal consent was obtained.   The risks, benefits and contents of the injection have been discussed.  Risks include but are not limited to allergic reaction, flare reaction, permanent white skin discoloration at the injection site and infection.  The patient understands the risks and agrees to having the injection.  All questions have been answered.  Ultrasound guidance was indicated for this patient due to prior failure or difficult injection.

## 2024-01-08 ENCOUNTER — APPOINTMENT (OUTPATIENT)
Dept: ORTHOPEDIC SURGERY | Facility: CLINIC | Age: 59
End: 2024-01-08
Payer: COMMERCIAL

## 2024-01-08 DIAGNOSIS — M17.12 UNILATERAL PRIMARY OSTEOARTHRITIS, LEFT KNEE: ICD-10-CM

## 2024-01-08 PROCEDURE — 99213 OFFICE O/P EST LOW 20 MIN: CPT | Mod: 25

## 2024-01-08 PROCEDURE — 20611 DRAIN/INJ JOINT/BURSA W/US: CPT | Mod: LT

## 2024-01-08 NOTE — PHYSICAL EXAM
[Able to Communicate] : able to communicate [Well Developed] : well developed [Well Nourished] : well nourished [Left] : left knee [NL (0)] : extension 0 degrees [Right] : right knee [Tunnels and hardware in proper position] : Tunnels and hardware in proper position [AP] : anteroposterior [Lateral] : lateral [[place]] : skyline [AP Standing] : anteroposterior standing [Mild tricompartmental OA medial narrowing] : Mild tricompartmental OA medial narrowing [] : no extensor lag [TWNoteComboBox7] : flexion 135 degrees

## 2024-01-08 NOTE — HISTORY OF PRESENT ILLNESS
[2] : 2 [Euflexxa] : Euflexxa [de-identified] : 1/8/23: Here for Euflexxa #3 left knee 12/29/23: Here to f/up left knee and for Euflexxa #2  12/20/23: Here to f/up left knee and to consider visco injections.  12/13/23: here to f/up b/l knee and review MRI results for left knee 11/22/23: here to f/up b/l knees. continues to have knee pain. Had surgery on both feet by Dr. Mathews, most recent was R foot (7/23). He has worsening pain knees, pain is constant left knee and intermittent on the right, shooting in nature.  He has pain qhs. Reports CSI last visit without relief.  Pain meds for his foot surgery were not helpful for his knees. NO relief with Tylenol. He has still needs 2 additional surgeries with Dr. Mathews for his feet. In too much pain knees to wait until those are done. Occupation:  [employer].  11/30/22: Here to f/up b/l knees. CSI at last visit with temporary relief.  11/2/22: here to f/up b/l knees. Reports continued b/l knee pain and stiffness.  5/18/22: . Scot Aviles, a 56-year-old male c/o B/L knee pain. Chronic for years. Pain has recently increased. c/o constant pain that gets better with rest and instability/buckling. Hx of right knee ACL recon and meniscus repair. No treatment to the left. Jay at [facility].  [] : no

## 2024-01-08 NOTE — DISCUSSION/SUMMARY
[de-identified] : 58m with left knee mmt and djd. discussed availability of visco injections and pt would like to proceed.  Euflexxa #3 left knee. Injection tolerated well. Post injection instructions reviewed. 1) wbat, cryotherapy 2) rtc 6 weeks prn  Entered by Lashell Wooten PA-C acting as scribe. Dr. Oakes- The documentation recorded by the scribe accurately reflects the service I personally performed and the decisions made by me.

## 2024-01-12 ENCOUNTER — APPOINTMENT (OUTPATIENT)
Dept: ORTHOPEDIC SURGERY | Facility: CLINIC | Age: 59
End: 2024-01-12

## 2024-01-29 ENCOUNTER — APPOINTMENT (OUTPATIENT)
Dept: INTERNAL MEDICINE | Facility: CLINIC | Age: 59
End: 2024-01-29
Payer: COMMERCIAL

## 2024-01-29 VITALS
OXYGEN SATURATION: 97 % | TEMPERATURE: 97.9 F | BODY MASS INDEX: 38.15 KG/M2 | RESPIRATION RATE: 16 BRPM | DIASTOLIC BLOOD PRESSURE: 82 MMHG | HEIGHT: 65 IN | HEART RATE: 83 BPM | WEIGHT: 229 LBS | SYSTOLIC BLOOD PRESSURE: 128 MMHG

## 2024-01-29 DIAGNOSIS — Z72.0 TOBACCO USE: ICD-10-CM

## 2024-01-29 DIAGNOSIS — I25.10 ATHEROSCLEROTIC HEART DISEASE OF NATIVE CORONARY ARTERY W/OUT ANGINA PECTORIS: ICD-10-CM

## 2024-01-29 DIAGNOSIS — I10 ESSENTIAL (PRIMARY) HYPERTENSION: ICD-10-CM

## 2024-01-29 DIAGNOSIS — F43.9 REACTION TO SEVERE STRESS, UNSPECIFIED: ICD-10-CM

## 2024-01-29 DIAGNOSIS — E78.5 HYPERLIPIDEMIA, UNSPECIFIED: ICD-10-CM

## 2024-01-29 DIAGNOSIS — G47.33 OBSTRUCTIVE SLEEP APNEA (ADULT) (PEDIATRIC): ICD-10-CM

## 2024-01-29 PROCEDURE — 99214 OFFICE O/P EST MOD 30 MIN: CPT

## 2024-01-29 PROCEDURE — 36415 COLL VENOUS BLD VENIPUNCTURE: CPT

## 2024-01-29 RX ORDER — METHYLPREDNISOLONE 4 MG/1
4 TABLET ORAL
Qty: 1 | Refills: 0 | Status: DISCONTINUED | COMMUNITY
Start: 2023-11-22 | End: 2024-01-29

## 2024-01-29 RX ORDER — MELOXICAM 15 MG/1
15 TABLET ORAL
Qty: 30 | Refills: 1 | Status: DISCONTINUED | COMMUNITY
Start: 2023-12-13 | End: 2024-01-29

## 2024-01-29 RX ORDER — DOXYCYCLINE HYCLATE 50 MG/1
50 CAPSULE ORAL
Refills: 0 | Status: DISCONTINUED | COMMUNITY
End: 2024-01-29

## 2024-01-29 NOTE — HISTORY OF PRESENT ILLNESS
[FreeTextEntry1] : f/u [de-identified] :  59 yo M pmhx seasonal allergies, preDM, HTN, HLD, CAD hx remote MI, sarcoidosis, DONG, colon polyps, diverticulosis, hemorrhoids, OA, LBP, hx drug abuse/alcoholism in recovery, obesity for f/u  Last seen 12/12/23 for f/u with labs ordered (pending)  Feeling well. Does not need med refills. Last ate > 4 hrs ago.  Reports is socially distancing and using precautions for covid prevention- on/off. Denies sick or covid positive contacts. Denies fever, chills, cough or sob. -hx pfizer series, hx booster 10/22   States his mom passed away 11/23- was in NH in [place] since 6/23, had end stages of Alzheimer's.  Notes also some work-related stress recently. feels coping okay, reports good social/family support Denies overt depression, HI/SI or panic attacks Following with therapist  hx CP-reports overall stable  -followed by cardio, seen 12/8/23 and is s/p cardiac cath in 1/24- told some plaque but no stents needed, no med changes needed. Reports no change since cardiology appt 12/8 mainly left chest pain a/w Left arm discomfort sharp, no relation to active onset daily, lasts seconds - minutes then self resolves a/w +dizziness/palpitations not a/w sob +occ onset of arm discomfort with neck movement, denies arm paresthesias or weakness  HTN, CAD hx remote MI- -carvedilol 12.5 bid (dose increased 3/23) -atorvastatin 20 mg, denies SE. -followed by cardio -followed by optho, last seen 6/23- told nl dilated exam, given new glasses. To f/u yearly. -no home BP checks recently -has low salt diet -denies sob, palpitations or leg swelling  hx OA in feet- had right great toe surgery 7/17/23, reports recovering okay. Ambulating w/o device. Reports pain controlled. -followed by ortho, last seen 11/23 -doing PT -on Tylenol ES prn, rare use  hx knee OA- reports stable, is s/p left injections x3 (last 1/24) -followed by ortho -on Tylenol prn  Reports intentional wt loss ~ 15 lbs with doing WW since 2/23, few lbs since more sedentary s/p foot surgery 7/23- notes has gained wt admits not adherent with ADA diet no formal exercise, is - stays active with that as able  hx DONG- dx'd 4 yrs ago, on CPAP- using on/off.  States told CPAP needs to be recalibrated-plans to follow-up on this soon. Reports occasionally naps during lunch hour -needs letter to verify DONG diagnosis that can show to his employer.   -followed by pulm, seen by new 9/23 with CPAP adherence advised. Has f/u 3/24.  hx sarcoidosis- dx'd 1994 on w/u of CP when noted LAD on imaging, dx'd s/p bronchoscopy with +LN bx. Told in remission > 10 yrs. -followed by pulm  hx gen itching (no rash) of unclear etiology- -followed by SP/[facility], last seen 5/23- states had negative testing and advised to continue Benadryl prn -on Benadryl use prn, ~ 2x/wk - helps  hx of rosacea-  -s/p Doxy course per Derm, on topical  Reports nl appetite and BMs. Denies GI complaints.  Denies  complaints.  +VAPE use on/off-not ready to quit

## 2024-01-29 NOTE — PHYSICAL EXAM
[No Acute Distress] : no acute distress [Well-Appearing] : well-appearing [Normal Sclera/Conjunctiva] : normal sclera/conjunctiva [PERRL] : pupils equal round and reactive to light [EOMI] : extraocular movements intact [Normal Outer Ear/Nose] : the outer ears and nose were normal in appearance [Normal Oropharynx] : the oropharynx was normal [Normal Nasal Mucosa] : the nasal mucosa was normal [No Lymphadenopathy] : no lymphadenopathy [Supple] : supple [Thyroid Normal, No Nodules] : the thyroid was normal and there were no nodules present [No Respiratory Distress] : no respiratory distress  [Clear to Auscultation] : lungs were clear to auscultation bilaterally [Normal Rate] : normal rate  [Regular Rhythm] : with a regular rhythm [Normal S1, S2] : normal S1 and S2 [No Murmur] : no murmur heard [Pedal Pulses Present] : the pedal pulses are present [No Edema] : there was no peripheral edema [Soft] : abdomen soft [Non Tender] : non-tender [No HSM] : no HSM [Normal Supraclavicular Nodes] : no supraclavicular lymphadenopathy [Normal Posterior Cervical Nodes] : no posterior cervical lymphadenopathy [Normal Anterior Cervical Nodes] : no anterior cervical lymphadenopathy [No CVA Tenderness] : no CVA  tenderness [No Spinal Tenderness] : no spinal tenderness [No Joint Swelling] : no joint swelling [Grossly Normal Strength/Tone] : grossly normal strength/tone [No Rash] : no rash [No Focal Deficits] : no focal deficits [Normal Gait] : normal gait [Normal Affect] : the affect was normal [Alert and Oriented x3] : oriented to person, place, and time [de-identified] : No chest tenderness [de-identified] : scattered freckles

## 2024-01-29 NOTE — HEALTH RISK ASSESSMENT
[0] : 2) Feeling down, depressed, or hopeless: Not at all (0) [PHQ-2 Negative - No further assessment needed] : PHQ-2 Negative - No further assessment needed [[id number]] : 0

## 2024-01-29 NOTE — REVIEW OF SYSTEMS
[Negative] : Neurological [FreeTextEntry5] : See HPI [FreeTextEntry9] : see HPI [de-identified] : See HPI

## 2024-01-29 NOTE — ASSESSMENT
[FreeTextEntry1] : _______________________________________________________________ 59 yo M pmhx seasonal allergies, preDM, HTN, HLD, CAD hx remote MI, sarcoidosis, DONG, colon polyps, diverticulosis, hemorrhoids, OA, LBP, hx drug abuse/alcoholism in recovery, obesity for f/u   HTN, HLD, CAD hx remote MI- is s/p cardiac cath in 1/24- told some plaque but no stents needed, no med changes needed.  Asx currently, BP wnl.  -8/23 cbc/bmp/TSH wnl, UA no prt -8/23 Tchol 163  LDL 97 HDL 39, LFTs wnl except ALT 47 -12/23 cmp wnl -carvedilol 12.5 bid (dose increased 3/23) -atorvastatin 20 mg, denies SE. -followed by cardio, f/u advised -home BP monitoring encouraged as able, Rx escribed prior -followed by optho, last seen 6/23- told nl dilated exam, given new glasses. To f/u yearly. -low salt diet, exercise, wt loss advised -advised prompt medical evaluation if symptoms worsen or new symptoms arise -check lipids  DM- newly dx'd 8/23 A1c 6.5 -ADA diet, wt loss advised -declines nutrition referral -check A1c and microalbumin  hx DONG (on CPAP), hx sarcoidosis (in remission)- -hx sarcoidosis dx'd 1994 on w/u of CP when noted LAD on imaging, dx'd s/p bronchoscopy with +LN bx. Told in remission > 10 yrs. -followed by pulm, seen 9/23 with CPAP advised. Has f/u 3/24. -CPAP adherence and follow-up for recalibration as told was needed encouraged adherence  -wt loss encouraged -Letter for work provided as requested  hx itching- chronic, unclear etiology -followed by A/I, last seen 5/23- states had negative testing and advised to continue Benadryl prn -on Benadryl use prn, ~ 2x/wk - helps -asked to forward records  hx OA in feet- hx right great toe surgery 7/23, reports recovering okay. -followed by ortho, last seen 1/24 -doing PT  -on Tylenol ES qod, mainly prior to PT  hx knee OA- reports stable.  is s/p left injections x3 (last 1/24) -followed by ortho, f/u advised -on Tylenol prn  hx grief (mom passed away 12/23), stress 2/2 work- denies acute sx's -support provided -Following with therapist -advised to f/u as needed  rosacea- -followed by derm, s/p doxy course.  On topical   obesity -BMI 37, hx intentional wt loss ~ 15 lbs with doing WW since 2/23. Regained since. -healthy eating, weight loss advised -declines med wt loss program referral -declines nutrition referral  MISC: Continued social distancing and measure for covid19 prevention encouraged. -hx pfizer series, hx booster 10/22. Booster advised.    HCM -hx CPE 8/23 -5/23 hep C screen negative -hx flu shot 10/23 -hx Tdap 10/22 -declines shingrix vaccine -hx screening colonoscopy 5/23: polyps, diverticulosis, hemorrhoids, rec: repeat in 3 yrs. -derm referral for screening- pending. Regular use of sun block for skin cancer prevention advised. -cont'd smoking/drug cessation and sobriety (has sponsor, attends AA prn) encouraged -cessation of VAPE use encouraged.  Declines assistance/referral.  Labs drawn in office today.  Pt's cell: [phone]  Patient advised that I will be leaving practice as of 3/1/24.  Encouraged to establish care with another colleague in office for continued medical care after my departure-agreeable.

## 2024-01-30 LAB
ANION GAP SERPL CALC-SCNC: 10 MMOL/L
BUN SERPL-MCNC: 10 MG/DL
CALCIUM SERPL-MCNC: 9.2 MG/DL
CHLORIDE SERPL-SCNC: 105 MMOL/L
CHOLEST SERPL-MCNC: 140 MG/DL
CO2 SERPL-SCNC: 25 MMOL/L
CREAT SERPL-MCNC: 1.03 MG/DL
EGFR: 84 ML/MIN/1.73M2
ESTIMATED AVERAGE GLUCOSE: 137 MG/DL
GLUCOSE SERPL-MCNC: 86 MG/DL
HBA1C MFR BLD HPLC: 6.4 %
HDLC SERPL-MCNC: 47 MG/DL
LDLC SERPL CALC-MCNC: 77 MG/DL
NONHDLC SERPL-MCNC: 93 MG/DL
POTASSIUM SERPL-SCNC: 4.4 MMOL/L
SODIUM SERPL-SCNC: 140 MMOL/L
TRIGL SERPL-MCNC: 86 MG/DL

## 2024-02-13 ENCOUNTER — RX RENEWAL (OUTPATIENT)
Age: 59
End: 2024-02-13

## 2024-02-13 RX ORDER — CARVEDILOL 12.5 MG/1
12.5 TABLET, FILM COATED ORAL
Qty: 180 | Refills: 2 | Status: ACTIVE | COMMUNITY
Start: 2022-06-28 | End: 1900-01-01

## 2024-03-08 ENCOUNTER — TRANSCRIPTION ENCOUNTER (OUTPATIENT)
Age: 59
End: 2024-03-08

## 2024-03-13 ENCOUNTER — APPOINTMENT (OUTPATIENT)
Dept: FAMILY MEDICINE | Facility: CLINIC | Age: 59
End: 2024-03-13
Payer: COMMERCIAL

## 2024-03-13 VITALS
OXYGEN SATURATION: 96 % | BODY MASS INDEX: 38.15 KG/M2 | HEART RATE: 53 BPM | SYSTOLIC BLOOD PRESSURE: 136 MMHG | TEMPERATURE: 98 F | HEIGHT: 65 IN | WEIGHT: 229 LBS | DIASTOLIC BLOOD PRESSURE: 82 MMHG

## 2024-03-13 DIAGNOSIS — E11.9 TYPE 2 DIABETES MELLITUS W/OUT COMPLICATIONS: ICD-10-CM

## 2024-03-13 PROCEDURE — G2211 COMPLEX E/M VISIT ADD ON: CPT

## 2024-03-13 PROCEDURE — 99213 OFFICE O/P EST LOW 20 MIN: CPT

## 2024-03-13 NOTE — HISTORY OF PRESENT ILLNESS
[FreeTextEntry1] : Transfer of care [de-identified] : 58-year-old male, PMH of CAD with remote history of MI, DONG of knees and foot, HLD, HTN, DONG, sarcoidosis, DM 2, obesity Feeling well today, no complaints  He is following with pulmonologist for a new CPAP machine He is up-to-date with cardio, pulmonary, Derm, Ortho

## 2024-03-13 NOTE — ASSESSMENT
[FreeTextEntry1] :  This visit was conducted as part of ongoing, longitudinal medical care for the patient's chronic medical diagnoses and other issues.  RPA 4 mos

## 2024-03-14 ENCOUNTER — APPOINTMENT (OUTPATIENT)
Dept: PULMONOLOGY | Facility: CLINIC | Age: 59
End: 2024-03-14

## 2024-03-14 ENCOUNTER — TRANSCRIPTION ENCOUNTER (OUTPATIENT)
Age: 59
End: 2024-03-14

## 2024-03-22 ENCOUNTER — TRANSCRIPTION ENCOUNTER (OUTPATIENT)
Age: 59
End: 2024-03-22

## 2024-03-28 ENCOUNTER — TRANSCRIPTION ENCOUNTER (OUTPATIENT)
Age: 59
End: 2024-03-28

## 2024-04-16 NOTE — ASU PATIENT PROFILE, ADULT - ABLE TO REACH PT
[facility] - PODIATRY  [address]  [address]  [place] [address]  Dept: [phone]  Dept Fax: [phone]    LAUREEN Gonzalez Jr., Jr.     Post-Operative Visit  Assessment:     1. Aftercare following surgery of the musculoskeletal system            Plan:   MDM    Coding  5 wk s/p     - pt seen evaluated and managed  -XR reviewed   S/p 1st TMTJ arthrodesis with hardware in place and intact w/o signs of complication. Deformity remains reduced and alignment improved compared to preoperative images. Osseous callus observed on 1 view indicative of incomplete bone healing at fusion site.  - cont at home ROM  - wb: pwbat on heel LLE  - rx dispensed: gabapentin and mobic  - referrals: none  Cont PT      Follow up in about 4 weeks (around 5/14/2024).      Subjective:      Patient ID: Michelle Vega is a 23 y.o. female.    Chief Complaint:   No chief complaint on file.    There were no vitals filed for this visit.    DOS: 3/15/24  Procedure: L 1st TMTJ fusion +ModMcB bunionectomy    Michelle Vega returns to the clinic today for a postop visit. Pt is s/p above procedure. Michelle Vega rates pain at a 5/10 on visual analog scale. Denies n/v/f/c.    HPI      Outside reports reviewed: historical medical records.    Past Medical History:   Diagnosis Date    Arthritis     Asthma     Depression     Pseudoseizures     Seizures 06/06/2014     Past Surgical History:   Procedure Laterality Date    BONE MARROW ASPIRATION Left 3/15/2024    Procedure: ASPIRATION, BONE MARROW;  Surgeon: Hermes Molina Jr., DPM;  Location: [facility] OR;  Service: Podiatry;  Laterality: Left;    BUNIONECTOMY Left 3/15/2024    Procedure: BUNIONECTOMY;  Surgeon: Hermes Molina Jr., DPM;  Location: [facility] OR;  Service: Podiatry;  Laterality: Left;    MIDFOOT ARTHRODESIS Left 3/15/2024    Procedure: FUSION, JOINT, MIDFOOT;  Surgeon: Hermes Molina Jr., DPM;  Location: [facility]  OR;  Service: Podiatry;  Laterality: Left;  pop saph, multiple joint fusions    VAGINAL DELIVERY      WISDOM TOOTH EXTRACTION       Family History   Problem Relation Name Age of Onset    Asthma Paternal Grandfather      No Known Problems Paternal Grandmother      ADD / ADHD Father      ADD / ADHD Mother      Breast cancer Neg Hx      Colon cancer Neg Hx      Ovarian cancer Neg Hx       Current Outpatient Medications   Medication Sig Dispense Refill    albuterol (PROVENTIL/VENTOLIN HFA) 90 mcg/actuation inhaler Inhale 2 puffs into the lungs every 4 (four) hours as needed for Wheezing. Rescue 18 g 2    aspirin 325 MG tablet Take 1 tablet (325 mg total) by mouth 2 (two) times a day. 120 tablet 0    azelastine (ASTELIN) 137 mcg (0.1 %) nasal spray 2 sprays 2 (two) times daily.      cetirizine (ZYRTEC) 10 MG tablet Take 10 mg by mouth once daily.      ondansetron (ZOFRAN-ODT) 4 MG TbDL Take 2 tablets (8 mg total) by mouth every 8 (eight) hours as needed (nausea). 30 tablet 0    tramadol-acetaminophen 37.5-325 mg (ULTRACET) 37.5-325 mg Tab Take 1 tablet by mouth every 4 (four) hours. 30 tablet 0     No current facility-administered medications for this visit.     Review of patient's allergies indicates:  No Known Allergies  Social History     Socioeconomic History    Marital status: Single    Number of children: 1   Occupational History    Occupation: [employer] sales   Tobacco Use    Smoking status: Former     Current packs/day: 0.25     Types: Cigarettes    Smokeless tobacco: Never    Tobacco comments:     Stopped smoking about 2 yrs ago   Substance and Sexual Activity    Alcohol use: Yes     Comment: occ    Drug use: Yes     Types: Marijuana    Sexual activity: Not Currently     Partners: Male   Social History Narrative    Lives with paternal grandmother.    1 inside dog.    In 11th grade at [employer]  REVIEW OF SYSTEMS: Negative as documented below as well as positive findings in bold.       Constitutional   Respiratory  Gastrointestinal  Skin   - Fever - Cough - Heartburn - Rash   - Chills - Spit blood - Nausea - Itching   - Weight Loss - Shortness of breath - Vomiting - Nail pain   - Malaise/Fatigue - Wheezing - Abdominal Pain  Wound/Ulcer   - Weight Gain   - Blood in Stool         - Diarrhea          Cardiovascular  Genitourinary  Neurological  HEENT   - Chest Pain - Dysuria - Dizziness - Headache   - Palpitations - Hematuria - Tingling - Congestion   - Pain at night in legs - Flank Pain - Tremor - Sore Throat   - Cramping   - Sensory Change - Blurred Vision   - Leg Swelling   - Speech Change - Double Vision   - Dizzy when standing   - Focal Weakness - Eye Redness       - Seizures - Dry Eyes       - Loss of Consciousness          Endocrine  Musculoskeletal  Psychiatric   - Cold intolerance - Muscle Pain - Depression   - Heat intolerance - Neck Pain - Insomnia   - Anemia - Joint Pain - Memory Loss   -  Easy bruising, bleeding - Heel pain - Anxiety      Toe Pain        Leg/Ankle/Foot Pain         Objective:     There were no vitals taken for this visit.    Physical Exam    Neck:  Trachea Midline  No visible masses    Respiratory/Ears:  No distress or labored breathing.  Able to differentiate between normal talking voice and whisper.  Able to follow commands    Eyes:  Visual Acuity intact  No discoloration noted.    Physical Exam  Ortho Exam  Foot Exam    L LE exam con't:  V: DP 2/4, PT 2/4, CRT< 3s to all digits tested.    N: SILT in SP/DP/T/Zoltan/Saph distributions    Ortho: +Motor EHL/FHL/TA/GA   Surgical site pain present and mild   Surgical site swelling absent   Deformity improved compared to preop   1st MTPJ ROM limited due to pain   Compartments soft/compressible. No pain on passive stretch of big toe. No calf  Pain.     Derm: Skin intact. Sutures/staples: intact. Signs of infection: none.     Imaging / Labs:    Lab Results   Component Value Date    WBC 5.50 03/05/2024    WBC 8.30 09/18/2018    WBC 10.62  07/29/2018    WBC 9.20 07/27/2018    WBC 9.74 12/13/2017    PROCAL 0.03 07/27/2018    CRP 35.9 (H) 07/27/2018    CRP 0.5 02/26/2017    PREALBUMIN 29 03/05/2024       Lab Results   Component Value Date    PREALBUMIN 29 03/05/2024     X-Ray Foot Complete Left    Result Date: 4/2/2024  EXAMINATION: XR FOOT COMPLETE 3 VIEW LEFT CLINICAL HISTORY: . Pain in left foot TECHNIQUE: AP, lateral, and oblique views of the foot were performed. COMPARISON: 03/27/2024 FINDINGS: There are postoperative changes associated with the 1st tarsometatarsal joints with 2 large staples seen across the joint.  There is also a screw seen across the 1st cuneiform extending into the 2nd cuneiform.  There are screw tracts noted within the midshaft of the 2nd metatarsal.  There also appear to be some probable osteotomy changes associated with the 1st metatarsal head.  No hardware failure or loosening is suggested.     As above Electronically signed by: Christopher Nava DO Date:    04/02/2024 Time:    11:14    X-Ray Foot Complete Left    Result Date: 3/27/2024  EXAMINATION: XR FOOT COMPLETE 3 VIEW LEFT CLINICAL HISTORY: .  Pain in left foot TECHNIQUE: AP, lateral and oblique views of the left foot were performed. FINDINGS: There is postoperative change involving the mid and forefoot with hardware in place and no evidence of hardware failure.  There is satisfactory osseous alignment.     As above. Electronically signed by: Alphonse Green MD Date:    03/27/2024 Time:    11:19      yes

## 2024-04-29 ENCOUNTER — APPOINTMENT (OUTPATIENT)
Dept: INTERNAL MEDICINE | Facility: CLINIC | Age: 59
End: 2024-04-29

## 2024-06-06 ENCOUNTER — NON-APPOINTMENT (OUTPATIENT)
Age: 59
End: 2024-06-06

## 2024-07-15 ENCOUNTER — APPOINTMENT (OUTPATIENT)
Dept: FAMILY MEDICINE | Facility: CLINIC | Age: 59
End: 2024-07-15

## 2024-07-15 VITALS
TEMPERATURE: 98.2 F | HEIGHT: 65 IN | WEIGHT: 230 LBS | HEART RATE: 95 BPM | DIASTOLIC BLOOD PRESSURE: 100 MMHG | SYSTOLIC BLOOD PRESSURE: 142 MMHG | OXYGEN SATURATION: 96 % | BODY MASS INDEX: 38.32 KG/M2

## 2024-07-15 VITALS — SYSTOLIC BLOOD PRESSURE: 130 MMHG | DIASTOLIC BLOOD PRESSURE: 90 MMHG

## 2024-07-15 DIAGNOSIS — I10 ESSENTIAL (PRIMARY) HYPERTENSION: ICD-10-CM

## 2024-07-15 DIAGNOSIS — R05.9 COUGH, UNSPECIFIED: ICD-10-CM

## 2024-07-15 DIAGNOSIS — E66.9 OBESITY, UNSPECIFIED: ICD-10-CM

## 2024-07-15 DIAGNOSIS — Z12.5 ENCOUNTER FOR SCREENING FOR MALIGNANT NEOPLASM OF PROSTATE: ICD-10-CM

## 2024-07-15 DIAGNOSIS — I25.10 ATHEROSCLEROTIC HEART DISEASE OF NATIVE CORONARY ARTERY W/OUT ANGINA PECTORIS: ICD-10-CM

## 2024-07-15 DIAGNOSIS — F17.210 NICOTINE DEPENDENCE, CIGARETTES, UNCOMPLICATED: ICD-10-CM

## 2024-07-15 DIAGNOSIS — E11.9 TYPE 2 DIABETES MELLITUS W/OUT COMPLICATIONS: ICD-10-CM

## 2024-07-15 DIAGNOSIS — E78.5 HYPERLIPIDEMIA, UNSPECIFIED: ICD-10-CM

## 2024-07-15 PROCEDURE — 99406 BEHAV CHNG SMOKING 3-10 MIN: CPT

## 2024-07-15 PROCEDURE — 99214 OFFICE O/P EST MOD 30 MIN: CPT | Mod: 25

## 2024-07-15 PROCEDURE — G0447 BEHAVIOR COUNSEL OBESITY 15M: CPT | Mod: 59

## 2024-07-15 PROCEDURE — G2211 COMPLEX E/M VISIT ADD ON: CPT

## 2024-07-16 LAB
APPEARANCE: CLEAR
BILIRUBIN URINE: NEGATIVE
BLOOD URINE: NEGATIVE
COLOR: YELLOW
GLUCOSE QUALITATIVE U: 100 MG/DL
KETONES URINE: NEGATIVE MG/DL
LEUKOCYTE ESTERASE URINE: NEGATIVE
NITRITE URINE: NEGATIVE
PH URINE: 5.5
PROTEIN URINE: NEGATIVE MG/DL
SPECIFIC GRAVITY URINE: 1.02
UROBILINOGEN URINE: 0.2 MG/DL

## 2024-07-26 LAB
ALBUMIN SERPL ELPH-MCNC: 4.4 G/DL
ALP BLD-CCNC: 79 U/L
ALT SERPL-CCNC: 34 U/L
ANION GAP SERPL CALC-SCNC: 14 MMOL/L
AST SERPL-CCNC: 21 U/L
BASOPHILS # BLD AUTO: 0.07 K/UL
BASOPHILS NFR BLD AUTO: 1.2 %
BILIRUB SERPL-MCNC: 0.4 MG/DL
BUN SERPL-MCNC: 13 MG/DL
CALCIUM SERPL-MCNC: 9.2 MG/DL
CHLORIDE SERPL-SCNC: 103 MMOL/L
CHOLEST SERPL-MCNC: 189 MG/DL
CO2 SERPL-SCNC: 22 MMOL/L
CREAT SERPL-MCNC: 0.99 MG/DL
EGFR: 87 ML/MIN/1.73M2
EOSINOPHIL # BLD AUTO: 0.27 K/UL
EOSINOPHIL NFR BLD AUTO: 4.5 %
ESTIMATED AVERAGE GLUCOSE: 140 MG/DL
GLUCOSE SERPL-MCNC: 127 MG/DL
HBA1C MFR BLD HPLC: 6.5 %
HCT VFR BLD CALC: 45.5 %
HDLC SERPL-MCNC: 39 MG/DL
HGB BLD-MCNC: 15.4 G/DL
IMM GRANULOCYTES NFR BLD AUTO: 0.3 %
LDLC SERPL CALC-MCNC: 122 MG/DL
LYMPHOCYTES # BLD AUTO: 1.65 K/UL
LYMPHOCYTES NFR BLD AUTO: 27.2 %
MAN DIFF?: NORMAL
MCHC RBC-ENTMCNC: 29.2 PG
MCHC RBC-ENTMCNC: 33.8 GM/DL
MCV RBC AUTO: 86.2 FL
MONOCYTES # BLD AUTO: 0.76 K/UL
MONOCYTES NFR BLD AUTO: 12.5 %
NEUTROPHILS # BLD AUTO: 3.29 K/UL
NEUTROPHILS NFR BLD AUTO: 54.3 %
NONHDLC SERPL-MCNC: 150 MG/DL
PLATELET # BLD AUTO: 303 K/UL
POTASSIUM SERPL-SCNC: 4.6 MMOL/L
PROT SERPL-MCNC: 7 G/DL
PSA SERPL-MCNC: 3.41 NG/ML
RBC # BLD: 5.28 M/UL
RBC # FLD: 13.4 %
SODIUM SERPL-SCNC: 140 MMOL/L
TRIGL SERPL-MCNC: 155 MG/DL
WBC # FLD AUTO: 6.06 K/UL

## 2024-07-28 ENCOUNTER — NON-APPOINTMENT (OUTPATIENT)
Age: 59
End: 2024-07-28

## 2024-07-29 RX ORDER — METFORMIN HYDROCHLORIDE 500 MG/1
500 TABLET, FILM COATED, EXTENDED RELEASE ORAL
Qty: 180 | Refills: 1 | Status: ACTIVE | COMMUNITY
Start: 2024-07-29 | End: 1900-01-01

## 2024-10-10 ENCOUNTER — NON-APPOINTMENT (OUTPATIENT)
Age: 59
End: 2024-10-10

## 2024-10-22 ENCOUNTER — APPOINTMENT (OUTPATIENT)
Dept: CARDIOLOGY | Facility: CLINIC | Age: 59
End: 2024-10-22
Payer: COMMERCIAL

## 2024-10-22 ENCOUNTER — NON-APPOINTMENT (OUTPATIENT)
Age: 59
End: 2024-10-22

## 2024-10-22 VITALS
OXYGEN SATURATION: 96 % | SYSTOLIC BLOOD PRESSURE: 144 MMHG | WEIGHT: 240 LBS | HEIGHT: 65 IN | DIASTOLIC BLOOD PRESSURE: 90 MMHG | HEART RATE: 89 BPM | BODY MASS INDEX: 39.99 KG/M2

## 2024-10-22 VITALS
OXYGEN SATURATION: 96 % | DIASTOLIC BLOOD PRESSURE: 82 MMHG | HEART RATE: 89 BPM | HEIGHT: 65 IN | SYSTOLIC BLOOD PRESSURE: 130 MMHG | BODY MASS INDEX: 39.99 KG/M2 | WEIGHT: 240 LBS | RESPIRATION RATE: 15 BRPM

## 2024-10-22 DIAGNOSIS — I10 ESSENTIAL (PRIMARY) HYPERTENSION: ICD-10-CM

## 2024-10-22 DIAGNOSIS — R07.89 OTHER CHEST PAIN: ICD-10-CM

## 2024-10-22 DIAGNOSIS — R94.31 ABNORMAL ELECTROCARDIOGRAM [ECG] [EKG]: ICD-10-CM

## 2024-10-22 DIAGNOSIS — I25.10 ATHEROSCLEROTIC HEART DISEASE OF NATIVE CORONARY ARTERY W/OUT ANGINA PECTORIS: ICD-10-CM

## 2024-10-22 PROCEDURE — 93000 ELECTROCARDIOGRAM COMPLETE: CPT

## 2024-10-22 PROCEDURE — 99214 OFFICE O/P EST MOD 30 MIN: CPT

## 2024-10-22 PROCEDURE — G2211 COMPLEX E/M VISIT ADD ON: CPT

## 2024-10-24 ENCOUNTER — APPOINTMENT (OUTPATIENT)
Dept: ORTHOPEDIC SURGERY | Facility: CLINIC | Age: 59
End: 2024-10-24
Payer: COMMERCIAL

## 2024-10-24 VITALS — HEIGHT: 65 IN | WEIGHT: 240 LBS | BODY MASS INDEX: 39.99 KG/M2

## 2024-10-24 DIAGNOSIS — Q74.2 OTHER CONGENITAL MALFORMATIONS OF LOWER LIMB(S), INCLUDING PELVIC GIRDLE: ICD-10-CM

## 2024-10-24 DIAGNOSIS — M20.21 HALLUX RIGIDUS, RIGHT FOOT: ICD-10-CM

## 2024-10-24 DIAGNOSIS — M76.822 POSTERIOR TIBIAL TENDINITIS, LEFT LEG: ICD-10-CM

## 2024-10-24 PROCEDURE — 99214 OFFICE O/P EST MOD 30 MIN: CPT | Mod: 57

## 2024-10-30 NOTE — ASU PATIENT PROFILE, ADULT - ABILITY TO HEAR (WITH HEARING AID OR HEARING APPLIANCE IF NORMALLY USED):
[FreeTextEntry1] : Abdulkadir was the 11-pound product of a Ft gestation, born by  to a  mother.  The pregnancy and birth histories are unremarkable.  Abdulkadir did well in the  period and went home with his mother at 2 days of age. Adequate: hears normal conversation without difficulty

## 2024-11-05 ENCOUNTER — APPOINTMENT (OUTPATIENT)
Dept: ORTHOPEDIC SURGERY | Facility: CLINIC | Age: 59
End: 2024-11-05

## 2024-11-15 ENCOUNTER — APPOINTMENT (OUTPATIENT)
Dept: FAMILY MEDICINE | Facility: CLINIC | Age: 59
End: 2024-11-15

## 2024-11-22 ENCOUNTER — RX CHANGE (OUTPATIENT)
Age: 59
End: 2024-11-22

## 2024-12-31 ENCOUNTER — APPOINTMENT (OUTPATIENT)
Dept: FAMILY MEDICINE | Facility: CLINIC | Age: 59
End: 2024-12-31
Payer: COMMERCIAL

## 2024-12-31 VITALS
TEMPERATURE: 98.9 F | HEIGHT: 65 IN | DIASTOLIC BLOOD PRESSURE: 92 MMHG | OXYGEN SATURATION: 97 % | HEART RATE: 83 BPM | WEIGHT: 232 LBS | SYSTOLIC BLOOD PRESSURE: 138 MMHG | BODY MASS INDEX: 38.65 KG/M2

## 2024-12-31 DIAGNOSIS — E78.5 HYPERLIPIDEMIA, UNSPECIFIED: ICD-10-CM

## 2024-12-31 DIAGNOSIS — E11.9 TYPE 2 DIABETES MELLITUS W/OUT COMPLICATIONS: ICD-10-CM

## 2024-12-31 DIAGNOSIS — R05.9 COUGH, UNSPECIFIED: ICD-10-CM

## 2024-12-31 PROCEDURE — 99214 OFFICE O/P EST MOD 30 MIN: CPT

## 2024-12-31 PROCEDURE — G2211 COMPLEX E/M VISIT ADD ON: CPT

## 2024-12-31 PROCEDURE — G0447 BEHAVIOR COUNSEL OBESITY 15M: CPT | Mod: 59

## 2024-12-31 PROCEDURE — 36415 COLL VENOUS BLD VENIPUNCTURE: CPT

## 2025-01-03 LAB
ALBUMIN SERPL ELPH-MCNC: 4.6 G/DL
ALP BLD-CCNC: 107 U/L
ALT SERPL-CCNC: 38 U/L
ANION GAP SERPL CALC-SCNC: 12 MMOL/L
AST SERPL-CCNC: 25 U/L
BASOPHILS # BLD AUTO: 0.07 K/UL
BASOPHILS NFR BLD AUTO: 0.7 %
BILIRUB SERPL-MCNC: 0.6 MG/DL
BUN SERPL-MCNC: 12 MG/DL
CALCIUM SERPL-MCNC: 9.7 MG/DL
CHLORIDE SERPL-SCNC: 101 MMOL/L
CHOLEST SERPL-MCNC: 169 MG/DL
CO2 SERPL-SCNC: 24 MMOL/L
CREAT SERPL-MCNC: 1 MG/DL
EGFR: 86 ML/MIN/1.73M2
EOSINOPHIL # BLD AUTO: 0.4 K/UL
EOSINOPHIL NFR BLD AUTO: 4.1 %
ESTIMATED AVERAGE GLUCOSE: 166 MG/DL
GLUCOSE SERPL-MCNC: 147 MG/DL
HBA1C MFR BLD HPLC: 7.4 %
HCT VFR BLD CALC: 47.9 %
HDLC SERPL-MCNC: 39 MG/DL
HGB BLD-MCNC: 15.8 G/DL
IMM GRANULOCYTES NFR BLD AUTO: 0.3 %
LDLC SERPL CALC-MCNC: 101 MG/DL
LYMPHOCYTES # BLD AUTO: 1.71 K/UL
LYMPHOCYTES NFR BLD AUTO: 17.4 %
MAN DIFF?: NORMAL
MCHC RBC-ENTMCNC: 29 PG
MCHC RBC-ENTMCNC: 33 G/DL
MCV RBC AUTO: 88.1 FL
MONOCYTES # BLD AUTO: 0.97 K/UL
MONOCYTES NFR BLD AUTO: 9.9 %
NEUTROPHILS # BLD AUTO: 6.63 K/UL
NEUTROPHILS NFR BLD AUTO: 67.6 %
NONHDLC SERPL-MCNC: 129 MG/DL
PLATELET # BLD AUTO: 254 K/UL
POTASSIUM SERPL-SCNC: 4.8 MMOL/L
PROT SERPL-MCNC: 7.8 G/DL
RBC # BLD: 5.44 M/UL
RBC # FLD: 13 %
S PYO AG SPEC QL IA: NEGATIVE
SODIUM SERPL-SCNC: 137 MMOL/L
TRIGL SERPL-MCNC: 159 MG/DL
WBC # FLD AUTO: 9.81 K/UL

## 2025-01-03 NOTE — DISCHARGE NOTE NURSING/CASE MANAGEMENT/SOCIAL WORK - NSSCCARECORD_GEN_ALL_CORE
Name: Daphne York      : 1948      MRN: [id number]  Encounter Provider: Ayaan Manley MD  Encounter Date: 1/3/2025   Encounter department: [facility] RHEUMATOLOGY [facility]  :  Assessment & Plan  PMR (polymyalgia rheumatica) ([facility])  Reviewed records per Dr Tim  On prednisone since  for PMR  Baseline 5 mg daily (she increases dose to 10 mg for 1-2 weeks during flare ups)  At this time no GCA symptoms  Reviewed prior labs LAQUITA, RF, CCP checked and normal    Orders:    predniSONE 5 mg tablet; Take 2 tablets (10 mg total) by mouth daily    Age-related osteoporosis without current pathological fracture  Bone health: DXA  high risk per FRAX. Has tried oral Fosamax with severe GERD. + calcium/D supplements. No fragility fractures or malignancies. Natural menopause in her 50s. Mother had fragility fractures.   S/p dose # 1 SC Prolia 10/16/24.  Schedule next Prolia dose after 25  Calcium/D supplements  Reviewed fall protection    Orders:    predniSONE 5 mg tablet; Take 2 tablets (10 mg total) by mouth daily    Current chronic use of systemic steroids  Continue SC Prolia  Orders:    predniSONE 5 mg tablet; Take 2 tablets (10 mg total) by mouth daily    Hypertension, unspecified type  On Losartan/HCTZ    Orders:    predniSONE 5 mg tablet; Take 2 tablets (10 mg total) by mouth daily    CRP elevated  Labs reviewed    Orders:    predniSONE 5 mg tablet; Take 2 tablets (10 mg total) by mouth daily    Primary osteoarthritis of both knees    S/p b/l knee replacement    Orders:    predniSONE 5 mg tablet; Take 2 tablets (10 mg total) by mouth daily        History of Present Illness   HPI  Daphne York is a 76 y.o. female who presents for further evaluation PMR  History obtained from: patient      This is a Rheumatology Consult seen at the request of Dr. Harry      HPI: Hannah York is a 77 y/o female who presents for further evaluation PMR. She has past medical  history asthma, HTN, GERD, PMR    She was seeing outside rheumatologist Dr Tim and transferring care    Reviewed records per Dr Tim.    Onset of symptoms March 2021 after her COVID vaccine. Pain and stiffness shoulders, hips, hands    Was Dx with PMR and started on prednisone which has helped her    She has been on prednisone since then    Has had flare ups at doses less than 5 mg daily    When she has a flare up she increases her dose to 10 mg for 1-2 weeks and then goes down to her baseline 5 mg daily.     Bone health: DXA 7/24 high risk per FRAX. Has tried oral Fosamax with severe GERD. S/p dose # 1 SC Prolia 10/16/24. + calcium/D supplements. No fragility fractures or malignancies. Natural menopause in her 50s. Mother had fragility fractures.    S/p b/l knee replacement      --------------------------------------------------------------------------------------------------------        ROS:      - for: Fevers, Chills or sweats.  No HAs or scalp tenderness.  No jaw claudication.  No acute visual or eye changes.  No dry eyes.  No auditory complaints.  No oral lesions or ulcers.  No dry mouth.  No sore throat or cough.  No chest pains or palpitations.  No shortness of breath, dyspnea on exertion or wheezing.  No hemotpysis.  No abdominal pain, GERD symptoms, diarrhea or constipation.  No urinary complaints.  No numbness, tingling or weakness.  No rashes.    All other ROS was reviewed and negative except as above         --------------------------------------------------------------------------------------------------------    Past Medical History    Past Medical History:   Diagnosis Date    Arthritis     Asthma     Chronic kidney disease     CKD stage 3    Hyperlipidemia     Hypertension     Nasal polyps     Polymyalgia rheumatica (HCC)            Past Surgical History    Past Surgical History:   Procedure Laterality Date    COLONOSCOPY      DILATION AND CURETTAGE OF UTERUS      HYSTEROSCOPY      of  uterus  d&c 1997    HYSTEROSCOPY W/ POLYPECTOMY      2000 and 2001  resolved    NOSE SURGERY      nasal endoscopy polypectomy    NOSE SURGERY      OK ARTHRP KNE CONDYLE&PLATU MEDIAL&LAT COMPARTMENTS Right 10/7/2020    Procedure: KNEE TOTAL ARTHROPLASTY;  Surgeon: Jitendra Sandoval DO;  Location:  MAIN OR;  Service: Orthopedics    OK ESOPHAGOGASTRODUODENOSCOPY TRANSORAL DIAGNOSTIC N/A 2/6/2019    Procedure: ESOPHAGOGASTRODUODENOSCOPY (EGD);  Surgeon: Rachell De Anda MD;  Location: [facility] GI LAB;  Service: Gastroenterology    TONSILLECTOMY      TOTAL KNEE ARTHROPLASTY Left            Family History    Family History   Problem Relation Age of Onset    Heart attack Mother     Osteoporosis Mother     Uterine cancer Mother     Heart disease Mother     No Known Problems Father     No Known Problems Daughter     No Known Problems Maternal Grandmother     No Known Problems Maternal Aunt             Social History    Social History     Tobacco Use    Smoking status: Never    Smokeless tobacco: Never   Vaping Use    Vaping status: Never Used   Substance Use Topics    Alcohol use: Yes     Comment: social    Drug use: No     Retired nurse      Allergies    Allergies   Allergen Reactions    Clarithromycin GI Intolerance     Reaction Date: 12Apr2011;     Codeine Hives     Reaction Date: 12Apr2011;     Estradiol      Annotation - [id number]: ELEVATES BP    Hydrocodone Hives    Hydrocodone-Acetaminophen Rash    Meloxicam Edema     Feet and ankles swell    Tramadol Other (See Comments)     red,itchy         Medications    Current Outpatient Medications   Medication Instructions    acetaminophen (TYLENOL) 650 mg, Oral, Every 4 hours PRN    atorvastatin (LIPITOR) 10 mg, Oral, Daily    buPROPion (WELLBUTRIN XL) 150 mg, Oral, Daily    Calcium Carbonate 1500 (600 Ca) MG TABS 1 tablet, 2 times daily    Cetirizine HCl (ZYRTEC ALLERGY PO) 1 tablet    denosumab (Prolia) 60 mg/mL Inject 1 mL by subcutaneous route.    docusate sodium (COLACE) 100  mg, Oral, 2 times daily    escitalopram (LEXAPRO) 20 mg, Oral, Daily    esomeprazole (NEXIUM) 40 mg, Oral, Daily    fluticasone (FLONASE) 50 mcg/act nasal spray into each nostril    Fluticasone-Salmeterol (Advair) 100-50 mcg/dose inhaler 1 puff, Inhalation, Every 12 hours, Rinse mouth after use.    losartan-hydrochlorothiazide (HYZAAR) 100-12.5 MG per tablet 1 tablet, Oral, Daily    predniSONE 5 mg, Daily          ________________________________________________________________________          Results Review      Component      Latest Ref [facility] 3/11/2024   WBC      4.31 - 10.16 Thousand/uL 9.37    RBC      3.81 - 5.12 Million/uL 3.92    Hemoglobin      11.5 - 15.4 g/dL 12.0    Hematocrit      34.8 - 46.1 % 37.3    MCV      82 - 98 fL 95    MCH      26.8 - 34.3 pg 30.6    MCHC      31.4 - 37.4 g/dL 32.2    RDW      11.6 - 15.1 % 12.9    MPV      8.9 - 12.7 fL 11.0    Platelet Count      149 - 390 Thousands/uL 235    nRBC      /100 WBCs 0    Segmented %      43 - 75 % 70    Immature Grans %      0 - 2 % 1    Lymphocytes %      14 - 44 % 21    Monocytes %      4 - 12 % 6    Eosinophils %      0 - 6 % 2    Basophils %      0 - 1 % 0    Absolute Neutrophils      1.85 - 7.62 Thousands/µL 6.54    Absolute Immature Grans      0.00 - 0.20 Thousand/uL 0.08    Absolute Lymphocytes      0.60 - 4.47 Thousands/µL 1.96    Absolute Monocytes      0.17 - 1.22 Thousand/µL 0.60    Absolute Eosinophils      0.00 - 0.61 Thousand/µL 0.17    Absolute Basophils      0.00 - 0.10 Thousands/µL 0.02    Sodium      135 - 147 mmol/L 143    Potassium      3.5 - 5.3 mmol/L 3.8    Chloride      96 - 108 mmol/L 100    Carbon Dioxide      21 - 32 mmol/L 33 (H)    ANION GAP      mmol/L 10    BUN      5 - 25 mg/dL 17    Creatinine      0.60 - 1.30 mg/dL 1.19    GLUCOSE, FASTING      65 - 99 mg/dL 80    Calcium      8.4 - 10.2 mg/dL 9.5    AST      13 - 39 U/L 17    ALT      7 - 52 U/L 14    ALK PHOS      34 - 104 U/L 61    Total Protein      6.4 - 8.4  "g/dL 6.5    Albumin      3.5 - 5.0 g/dL 4.2    Total Bilirubin      0.20 - 1.00 mg/dL 0.49    GFR, Calculated      ml/min/1.73sq m 44    Sed Rate      0 - 29 mm/hour 22    C-REACTIVE PROTEIN      <3.0 mg/L 6.9 (H)       Legend:  (H) High    Component      Latest Ref Rng 5/21/2021 9/2/2021   ANTI-NUCLEAR ANTIBODY (LAQUITA)      Negative  Negative     CYCLIC CITRULLINATED PEPTIDE ANTIBODY      See comment   0.9    RHEUMATOID FACTOR      Negative   Negative              Review of Systems       Objective   /74   Pulse 72   Ht 5' 6\" (1.676 m)   Wt 84.4 kg (186 lb)   SpO2 97%   BMI 30.02 kg/m²      Physical Exam    GEN: AAO, No apparent distress.  Patient is well developed.  HEENT:  Pupils are equal, round and reactive.  Sclera are clear.  Fundoscopic exam is normal.  External ears are without lesions.  Oral pharynx is clear of ulcers or other lesions.  MMM.   NECK:  Supple.  There is no adenopathy appreciable in anterior or posterior cervical chains or supraclavicularly.  JVP is normal.    HEART: Regular rate and rhythm.  There is no appreciable murmur, gallop or rub.  LUNGS: Clear to auscultation.  ABD:  Soft, without tenderness, rebound or guarding.  No appreciable organomegally.  NEURO: Speech and cognition are normal.  Strength is 5/5 throughout.  Tone is normal.  DTRs are 2/4 at the knees, ankles and elbows.  Gait is normal.  SKIN: There are no rashes or lesions    MUSCULOSKELETAL:   No synovitis noted        I have spent a total time of 62 minutes in caring for this patient on the day of the visit/encounter including Diagnostic results, Prognosis, Risks and benefits of tx options, Instructions for management, Counseling / Coordination of care, Documenting in the medical record, Reviewing / ordering tests, medicine, procedures  , and Obtaining or reviewing history  .    " [facility]

## 2025-01-06 ENCOUNTER — NON-APPOINTMENT (OUTPATIENT)
Age: 60
End: 2025-01-06

## 2025-01-07 ENCOUNTER — OUTPATIENT (OUTPATIENT)
Dept: OUTPATIENT SERVICES | Facility: HOSPITAL | Age: 60
LOS: 1 days | End: 2025-01-07
Payer: COMMERCIAL

## 2025-01-07 ENCOUNTER — APPOINTMENT (OUTPATIENT)
Dept: INTERNAL MEDICINE | Facility: CLINIC | Age: 60
End: 2025-01-07
Payer: COMMERCIAL

## 2025-01-07 ENCOUNTER — APPOINTMENT (OUTPATIENT)
Dept: RADIOLOGY | Facility: CLINIC | Age: 60
End: 2025-01-07
Payer: COMMERCIAL

## 2025-01-07 VITALS
DIASTOLIC BLOOD PRESSURE: 86 MMHG | BODY MASS INDEX: 38.32 KG/M2 | TEMPERATURE: 99 F | SYSTOLIC BLOOD PRESSURE: 135 MMHG | WEIGHT: 230 LBS | HEART RATE: 109 BPM | OXYGEN SATURATION: 97 % | HEIGHT: 65 IN

## 2025-01-07 DIAGNOSIS — M54.5 LOW BACK PAIN: Chronic | ICD-10-CM

## 2025-01-07 DIAGNOSIS — Z98.890 OTHER SPECIFIED POSTPROCEDURAL STATES: Chronic | ICD-10-CM

## 2025-01-07 DIAGNOSIS — Z96.611 PRESENCE OF RIGHT ARTIFICIAL SHOULDER JOINT: Chronic | ICD-10-CM

## 2025-01-07 DIAGNOSIS — R05.8 OTHER SPECIFIED COUGH: ICD-10-CM

## 2025-01-07 DIAGNOSIS — R52 PAIN, UNSPECIFIED: ICD-10-CM

## 2025-01-07 PROCEDURE — 99213 OFFICE O/P EST LOW 20 MIN: CPT

## 2025-01-07 PROCEDURE — 71046 X-RAY EXAM CHEST 2 VIEWS: CPT | Mod: 26

## 2025-01-07 PROCEDURE — 71046 X-RAY EXAM CHEST 2 VIEWS: CPT

## 2025-01-07 RX ORDER — SEMAGLUTIDE 0.25 MG/.5ML
0.25 INJECTION, SOLUTION SUBCUTANEOUS
Qty: 1 | Refills: 0 | Status: ACTIVE | COMMUNITY
Start: 2024-12-31

## 2025-01-07 RX ORDER — METHYLPREDNISOLONE 4 MG/1
4 TABLET ORAL
Qty: 1 | Refills: 0 | Status: ACTIVE | COMMUNITY
Start: 2025-01-07 | End: 1900-01-01

## 2025-01-07 RX ORDER — DOXYCYCLINE HYCLATE 100 MG/1
100 TABLET ORAL TWICE DAILY
Qty: 14 | Refills: 0 | Status: ACTIVE | COMMUNITY
Start: 2025-01-07 | End: 1900-01-01

## 2025-01-08 LAB
RAPID RVP RESULT: NOT DETECTED
SARS-COV-2 RNA RESP QL NAA+PROBE: NOT DETECTED

## 2025-01-16 ENCOUNTER — TRANSCRIPTION ENCOUNTER (OUTPATIENT)
Age: 60
End: 2025-01-16

## 2025-01-16 ENCOUNTER — NON-APPOINTMENT (OUTPATIENT)
Age: 60
End: 2025-01-16

## 2025-01-16 ENCOUNTER — OUTPATIENT (OUTPATIENT)
Dept: OUTPATIENT SERVICES | Facility: HOSPITAL | Age: 60
LOS: 1 days | End: 2025-01-16

## 2025-01-16 ENCOUNTER — APPOINTMENT (OUTPATIENT)
Age: 60
End: 2025-01-16

## 2025-01-16 DIAGNOSIS — M54.5 LOW BACK PAIN: Chronic | ICD-10-CM

## 2025-01-16 DIAGNOSIS — Z98.890 OTHER SPECIFIED POSTPROCEDURAL STATES: Chronic | ICD-10-CM

## 2025-01-17 ENCOUNTER — TRANSCRIPTION ENCOUNTER (OUTPATIENT)
Age: 60
End: 2025-01-17

## 2025-01-20 ENCOUNTER — NON-APPOINTMENT (OUTPATIENT)
Age: 60
End: 2025-01-20

## 2025-01-20 ENCOUNTER — APPOINTMENT (OUTPATIENT)
Dept: FAMILY MEDICINE | Facility: CLINIC | Age: 60
End: 2025-01-20
Payer: COMMERCIAL

## 2025-01-20 VITALS
WEIGHT: 234 LBS | TEMPERATURE: 98.4 F | HEIGHT: 65 IN | HEART RATE: 87 BPM | OXYGEN SATURATION: 96 % | DIASTOLIC BLOOD PRESSURE: 90 MMHG | SYSTOLIC BLOOD PRESSURE: 130 MMHG | BODY MASS INDEX: 38.99 KG/M2

## 2025-01-20 DIAGNOSIS — E11.9 TYPE 2 DIABETES MELLITUS W/OUT COMPLICATIONS: ICD-10-CM

## 2025-01-20 DIAGNOSIS — G47.33 OBSTRUCTIVE SLEEP APNEA (ADULT) (PEDIATRIC): ICD-10-CM

## 2025-01-20 DIAGNOSIS — M20.21 HALLUX RIGIDUS, RIGHT FOOT: ICD-10-CM

## 2025-01-20 DIAGNOSIS — I10 ESSENTIAL (PRIMARY) HYPERTENSION: ICD-10-CM

## 2025-01-20 DIAGNOSIS — D86.9 SARCOIDOSIS, UNSPECIFIED: ICD-10-CM

## 2025-01-20 DIAGNOSIS — Z01.818 ENCOUNTER FOR OTHER PREPROCEDURAL EXAMINATION: ICD-10-CM

## 2025-01-20 PROCEDURE — G2211 COMPLEX E/M VISIT ADD ON: CPT

## 2025-01-20 PROCEDURE — 99214 OFFICE O/P EST MOD 30 MIN: CPT

## 2025-01-20 PROCEDURE — 36415 COLL VENOUS BLD VENIPUNCTURE: CPT

## 2025-01-20 PROCEDURE — 93000 ELECTROCARDIOGRAM COMPLETE: CPT

## 2025-01-24 PROBLEM — Z01.818 PRE-OP EVALUATION: Status: ACTIVE | Noted: 2025-01-20

## 2025-01-27 ENCOUNTER — APPOINTMENT (OUTPATIENT)
Dept: FAMILY MEDICINE | Facility: CLINIC | Age: 60
End: 2025-01-27

## 2025-01-28 LAB
ANION GAP SERPL CALC-SCNC: 9 MMOL/L
BASOPHILS # BLD AUTO: 0.1 K/UL
BASOPHILS NFR BLD AUTO: 1.2 %
BUN SERPL-MCNC: 13 MG/DL
CALCIUM SERPL-MCNC: 9.7 MG/DL
CHLORIDE SERPL-SCNC: 104 MMOL/L
CO2 SERPL-SCNC: 28 MMOL/L
CREAT SERPL-MCNC: 1.02 MG/DL
EGFR: 84 ML/MIN/1.73M2
EOSINOPHIL # BLD AUTO: 0.28 K/UL
EOSINOPHIL NFR BLD AUTO: 3.4 %
GLUCOSE SERPL-MCNC: 107 MG/DL
HCT VFR BLD CALC: 46 %
HGB BLD-MCNC: 14.9 G/DL
IMM GRANULOCYTES NFR BLD AUTO: 0.2 %
LYMPHOCYTES # BLD AUTO: 1.86 K/UL
LYMPHOCYTES NFR BLD AUTO: 22.9 %
MAN DIFF?: NORMAL
MCHC RBC-ENTMCNC: 28.7 PG
MCHC RBC-ENTMCNC: 32.4 G/DL
MCV RBC AUTO: 88.5 FL
MONOCYTES # BLD AUTO: 1.04 K/UL
MONOCYTES NFR BLD AUTO: 12.8 %
NEUTROPHILS # BLD AUTO: 4.83 K/UL
NEUTROPHILS NFR BLD AUTO: 59.5 %
PLATELET # BLD AUTO: 305 K/UL
POTASSIUM SERPL-SCNC: 4.8 MMOL/L
RBC # BLD: 5.2 M/UL
RBC # FLD: 12.6 %
SODIUM SERPL-SCNC: 141 MMOL/L
WBC # FLD AUTO: 8.13 K/UL

## 2025-01-30 ENCOUNTER — TRANSCRIPTION ENCOUNTER (OUTPATIENT)
Age: 60
End: 2025-01-30

## 2025-01-30 RX ORDER — HYDROCODONE BITARTRATE AND ACETAMINOPHEN 5; 325 MG/1; MG/1
5-325 TABLET ORAL
Qty: 20 | Refills: 0 | Status: ACTIVE | COMMUNITY
Start: 2025-01-30 | End: 1900-01-01

## 2025-02-03 ENCOUNTER — APPOINTMENT (OUTPATIENT)
Age: 60
End: 2025-02-03
Payer: COMMERCIAL

## 2025-02-03 PROCEDURE — 64450 NJX AA&/STRD OTHER PN/BRANCH: CPT | Mod: RT

## 2025-02-03 PROCEDURE — 73620 X-RAY EXAM OF FOOT: CPT | Mod: 26

## 2025-02-03 PROCEDURE — 28238 REVISION OF FOOT TENDON: CPT | Mod: RT

## 2025-02-14 ENCOUNTER — APPOINTMENT (OUTPATIENT)
Dept: ORTHOPEDIC SURGERY | Facility: CLINIC | Age: 60
End: 2025-02-14
Payer: COMMERCIAL

## 2025-02-14 DIAGNOSIS — Q74.2 OTHER CONGENITAL MALFORMATIONS OF LOWER LIMB(S), INCLUDING PELVIC GIRDLE: ICD-10-CM

## 2025-02-14 PROCEDURE — 73630 X-RAY EXAM OF FOOT: CPT | Mod: RT

## 2025-02-14 PROCEDURE — 99024 POSTOP FOLLOW-UP VISIT: CPT

## 2025-02-14 PROCEDURE — 29405 APPL SHORT LEG CAST: CPT

## 2025-03-14 ENCOUNTER — APPOINTMENT (OUTPATIENT)
Dept: ORTHOPEDIC SURGERY | Facility: CLINIC | Age: 60
End: 2025-03-14
Payer: COMMERCIAL

## 2025-03-14 ENCOUNTER — RX RENEWAL (OUTPATIENT)
Age: 60
End: 2025-03-14

## 2025-03-14 DIAGNOSIS — Q74.2 OTHER CONGENITAL MALFORMATIONS OF LOWER LIMB(S), INCLUDING PELVIC GIRDLE: ICD-10-CM

## 2025-03-14 PROCEDURE — L4361: CPT | Mod: RT

## 2025-03-14 PROCEDURE — 73630 X-RAY EXAM OF FOOT: CPT | Mod: RT

## 2025-03-14 PROCEDURE — 99024 POSTOP FOLLOW-UP VISIT: CPT

## 2025-04-02 ENCOUNTER — APPOINTMENT (OUTPATIENT)
Dept: FAMILY MEDICINE | Facility: CLINIC | Age: 60
End: 2025-04-02
Payer: COMMERCIAL

## 2025-04-02 VITALS
DIASTOLIC BLOOD PRESSURE: 88 MMHG | SYSTOLIC BLOOD PRESSURE: 130 MMHG | HEART RATE: 92 BPM | BODY MASS INDEX: 38.99 KG/M2 | TEMPERATURE: 98 F | OXYGEN SATURATION: 97 % | WEIGHT: 234 LBS | HEIGHT: 65 IN

## 2025-04-02 DIAGNOSIS — E78.5 HYPERLIPIDEMIA, UNSPECIFIED: ICD-10-CM

## 2025-04-02 DIAGNOSIS — E11.9 TYPE 2 DIABETES MELLITUS W/OUT COMPLICATIONS: ICD-10-CM

## 2025-04-02 DIAGNOSIS — I10 ESSENTIAL (PRIMARY) HYPERTENSION: ICD-10-CM

## 2025-04-02 DIAGNOSIS — F17.290 NICOTINE DEPENDENCE, OTHER TOBACCO PRODUCT, UNCOMPLICATED: ICD-10-CM

## 2025-04-02 DIAGNOSIS — F17.210 NICOTINE DEPENDENCE, CIGARETTES, UNCOMPLICATED: ICD-10-CM

## 2025-04-02 DIAGNOSIS — G47.33 OBSTRUCTIVE SLEEP APNEA (ADULT) (PEDIATRIC): ICD-10-CM

## 2025-04-02 PROCEDURE — G2211 COMPLEX E/M VISIT ADD ON: CPT

## 2025-04-02 PROCEDURE — 99214 OFFICE O/P EST MOD 30 MIN: CPT

## 2025-04-02 PROCEDURE — 99406 BEHAV CHNG SMOKING 3-10 MIN: CPT

## 2025-04-02 PROCEDURE — G0447 BEHAVIOR COUNSEL OBESITY 15M: CPT | Mod: 59

## 2025-04-11 ENCOUNTER — APPOINTMENT (OUTPATIENT)
Dept: ORTHOPEDIC SURGERY | Facility: CLINIC | Age: 60
End: 2025-04-11
Payer: COMMERCIAL

## 2025-04-11 DIAGNOSIS — Q74.2 OTHER CONGENITAL MALFORMATIONS OF LOWER LIMB(S), INCLUDING PELVIC GIRDLE: ICD-10-CM

## 2025-04-11 PROCEDURE — 99024 POSTOP FOLLOW-UP VISIT: CPT

## 2025-04-22 ENCOUNTER — APPOINTMENT (OUTPATIENT)
Dept: CARDIOLOGY | Facility: CLINIC | Age: 60
End: 2025-04-22
Payer: COMMERCIAL

## 2025-04-22 VITALS
SYSTOLIC BLOOD PRESSURE: 138 MMHG | OXYGEN SATURATION: 98 % | HEART RATE: 104 BPM | BODY MASS INDEX: 38.15 KG/M2 | HEIGHT: 65 IN | DIASTOLIC BLOOD PRESSURE: 90 MMHG | WEIGHT: 229 LBS

## 2025-04-22 VITALS — SYSTOLIC BLOOD PRESSURE: 130 MMHG | DIASTOLIC BLOOD PRESSURE: 88 MMHG

## 2025-04-22 DIAGNOSIS — I25.10 ATHEROSCLEROTIC HEART DISEASE OF NATIVE CORONARY ARTERY W/OUT ANGINA PECTORIS: ICD-10-CM

## 2025-04-22 DIAGNOSIS — E78.5 HYPERLIPIDEMIA, UNSPECIFIED: ICD-10-CM

## 2025-04-22 DIAGNOSIS — R07.89 OTHER CHEST PAIN: ICD-10-CM

## 2025-04-22 DIAGNOSIS — I10 ESSENTIAL (PRIMARY) HYPERTENSION: ICD-10-CM

## 2025-04-22 PROCEDURE — 93000 ELECTROCARDIOGRAM COMPLETE: CPT

## 2025-04-22 PROCEDURE — 99214 OFFICE O/P EST MOD 30 MIN: CPT

## 2025-04-22 PROCEDURE — G2211 COMPLEX E/M VISIT ADD ON: CPT

## 2025-04-23 ENCOUNTER — EMERGENCY (EMERGENCY)
Facility: HOSPITAL | Age: 60
LOS: 1 days | End: 2025-04-23
Attending: STUDENT IN AN ORGANIZED HEALTH CARE EDUCATION/TRAINING PROGRAM | Admitting: STUDENT IN AN ORGANIZED HEALTH CARE EDUCATION/TRAINING PROGRAM
Payer: COMMERCIAL

## 2025-04-23 VITALS
SYSTOLIC BLOOD PRESSURE: 119 MMHG | HEART RATE: 95 BPM | DIASTOLIC BLOOD PRESSURE: 81 MMHG | RESPIRATION RATE: 20 BRPM | OXYGEN SATURATION: 94 % | TEMPERATURE: 98 F

## 2025-04-23 VITALS
HEART RATE: 106 BPM | WEIGHT: 227.96 LBS | OXYGEN SATURATION: 99 % | RESPIRATION RATE: 20 BRPM | SYSTOLIC BLOOD PRESSURE: 159 MMHG | TEMPERATURE: 98 F | HEIGHT: 65 IN | DIASTOLIC BLOOD PRESSURE: 100 MMHG

## 2025-04-23 DIAGNOSIS — Z96.611 PRESENCE OF RIGHT ARTIFICIAL SHOULDER JOINT: Chronic | ICD-10-CM

## 2025-04-23 DIAGNOSIS — M54.5 LOW BACK PAIN: Chronic | ICD-10-CM

## 2025-04-23 DIAGNOSIS — Z98.890 OTHER SPECIFIED POSTPROCEDURAL STATES: Chronic | ICD-10-CM

## 2025-04-23 LAB
ALBUMIN SERPL ELPH-MCNC: 3.9 G/DL — SIGNIFICANT CHANGE UP (ref 3.3–5)
ALP SERPL-CCNC: 95 U/L — SIGNIFICANT CHANGE UP (ref 40–120)
ALT FLD-CCNC: 46 U/L — SIGNIFICANT CHANGE UP (ref 12–78)
ANION GAP SERPL CALC-SCNC: 8 MMOL/L — SIGNIFICANT CHANGE UP (ref 5–17)
APTT BLD: 31.3 SEC — SIGNIFICANT CHANGE UP (ref 26.1–36.8)
AST SERPL-CCNC: 31 U/L — SIGNIFICANT CHANGE UP (ref 15–37)
BASOPHILS # BLD AUTO: 0.06 K/UL — SIGNIFICANT CHANGE UP (ref 0–0.2)
BASOPHILS NFR BLD AUTO: 0.5 % — SIGNIFICANT CHANGE UP (ref 0–2)
BILIRUB SERPL-MCNC: 0.6 MG/DL — SIGNIFICANT CHANGE UP (ref 0.2–1.2)
BUN SERPL-MCNC: 18 MG/DL — SIGNIFICANT CHANGE UP (ref 7–23)
CALCIUM SERPL-MCNC: 9.6 MG/DL — SIGNIFICANT CHANGE UP (ref 8.5–10.1)
CHLORIDE SERPL-SCNC: 104 MMOL/L — SIGNIFICANT CHANGE UP (ref 96–108)
CO2 SERPL-SCNC: 26 MMOL/L — SIGNIFICANT CHANGE UP (ref 22–31)
CREAT SERPL-MCNC: 1.1 MG/DL — SIGNIFICANT CHANGE UP (ref 0.5–1.3)
D DIMER BLD IA.RAPID-MCNC: 504 NG/ML DDU — HIGH
EGFR: 77 ML/MIN/1.73M2 — SIGNIFICANT CHANGE UP
EGFR: 77 ML/MIN/1.73M2 — SIGNIFICANT CHANGE UP
EOSINOPHIL # BLD AUTO: 0.25 K/UL — SIGNIFICANT CHANGE UP (ref 0–0.5)
EOSINOPHIL NFR BLD AUTO: 2.3 % — SIGNIFICANT CHANGE UP (ref 0–6)
GLUCOSE SERPL-MCNC: 95 MG/DL — SIGNIFICANT CHANGE UP (ref 70–99)
HCT VFR BLD CALC: 43.6 % — SIGNIFICANT CHANGE UP (ref 39–50)
HGB BLD-MCNC: 15.1 G/DL — SIGNIFICANT CHANGE UP (ref 13–17)
IMM GRANULOCYTES NFR BLD AUTO: 0.3 % — SIGNIFICANT CHANGE UP (ref 0–0.9)
INR BLD: 1.26 RATIO — HIGH (ref 0.85–1.16)
LIDOCAIN IGE QN: 40 U/L — SIGNIFICANT CHANGE UP (ref 13–75)
LYMPHOCYTES # BLD AUTO: 1.57 K/UL — SIGNIFICANT CHANGE UP (ref 1–3.3)
LYMPHOCYTES # BLD AUTO: 14.4 % — SIGNIFICANT CHANGE UP (ref 13–44)
MCHC RBC-ENTMCNC: 28.8 PG — SIGNIFICANT CHANGE UP (ref 27–34)
MCHC RBC-ENTMCNC: 34.6 G/DL — SIGNIFICANT CHANGE UP (ref 32–36)
MCV RBC AUTO: 83.2 FL — SIGNIFICANT CHANGE UP (ref 80–100)
MONOCYTES # BLD AUTO: 1.07 K/UL — HIGH (ref 0–0.9)
MONOCYTES NFR BLD AUTO: 9.8 % — SIGNIFICANT CHANGE UP (ref 2–14)
NEUTROPHILS # BLD AUTO: 7.96 K/UL — HIGH (ref 1.8–7.4)
NEUTROPHILS NFR BLD AUTO: 72.7 % — SIGNIFICANT CHANGE UP (ref 43–77)
NRBC BLD AUTO-RTO: 0 /100 WBCS — SIGNIFICANT CHANGE UP (ref 0–0)
NT-PROBNP SERPL-SCNC: 42 PG/ML — SIGNIFICANT CHANGE UP (ref 0–125)
PLATELET # BLD AUTO: 280 K/UL — SIGNIFICANT CHANGE UP (ref 150–400)
POTASSIUM SERPL-MCNC: 3.9 MMOL/L — SIGNIFICANT CHANGE UP (ref 3.5–5.3)
POTASSIUM SERPL-SCNC: 3.9 MMOL/L — SIGNIFICANT CHANGE UP (ref 3.5–5.3)
PROT SERPL-MCNC: 7.9 G/DL — SIGNIFICANT CHANGE UP (ref 6–8.3)
PROTHROM AB SERPL-ACNC: 14.7 SEC — HIGH (ref 9.9–13.4)
RBC # BLD: 5.24 M/UL — SIGNIFICANT CHANGE UP (ref 4.2–5.8)
RBC # FLD: 13.1 % — SIGNIFICANT CHANGE UP (ref 10.3–14.5)
SODIUM SERPL-SCNC: 138 MMOL/L — SIGNIFICANT CHANGE UP (ref 135–145)
TROPONIN I, HIGH SENSITIVITY RESULT: 3.7 NG/L — SIGNIFICANT CHANGE UP
TROPONIN I, HIGH SENSITIVITY RESULT: 4.6 NG/L — SIGNIFICANT CHANGE UP
WBC # BLD: 10.94 K/UL — HIGH (ref 3.8–10.5)
WBC # FLD AUTO: 10.94 K/UL — HIGH (ref 3.8–10.5)

## 2025-04-23 PROCEDURE — 71275 CT ANGIOGRAPHY CHEST: CPT | Mod: MC

## 2025-04-23 PROCEDURE — 83690 ASSAY OF LIPASE: CPT

## 2025-04-23 PROCEDURE — 99285 EMERGENCY DEPT VISIT HI MDM: CPT

## 2025-04-23 PROCEDURE — 85379 FIBRIN DEGRADATION QUANT: CPT

## 2025-04-23 PROCEDURE — 74174 CTA ABD&PLVS W/CONTRAST: CPT | Mod: MC

## 2025-04-23 PROCEDURE — 96374 THER/PROPH/DIAG INJ IV PUSH: CPT | Mod: XU

## 2025-04-23 PROCEDURE — 36415 COLL VENOUS BLD VENIPUNCTURE: CPT

## 2025-04-23 PROCEDURE — 93010 ELECTROCARDIOGRAM REPORT: CPT

## 2025-04-23 PROCEDURE — 96375 TX/PRO/DX INJ NEW DRUG ADDON: CPT | Mod: XU

## 2025-04-23 PROCEDURE — 71275 CT ANGIOGRAPHY CHEST: CPT | Mod: 26

## 2025-04-23 PROCEDURE — 96376 TX/PRO/DX INJ SAME DRUG ADON: CPT | Mod: XU

## 2025-04-23 PROCEDURE — 93005 ELECTROCARDIOGRAM TRACING: CPT

## 2025-04-23 PROCEDURE — 83880 ASSAY OF NATRIURETIC PEPTIDE: CPT

## 2025-04-23 PROCEDURE — 80053 COMPREHEN METABOLIC PANEL: CPT

## 2025-04-23 PROCEDURE — 99285 EMERGENCY DEPT VISIT HI MDM: CPT | Mod: 25

## 2025-04-23 PROCEDURE — 84484 ASSAY OF TROPONIN QUANT: CPT

## 2025-04-23 PROCEDURE — 85730 THROMBOPLASTIN TIME PARTIAL: CPT

## 2025-04-23 PROCEDURE — 85610 PROTHROMBIN TIME: CPT

## 2025-04-23 PROCEDURE — 85025 COMPLETE CBC W/AUTO DIFF WBC: CPT

## 2025-04-23 PROCEDURE — 74174 CTA ABD&PLVS W/CONTRAST: CPT | Mod: 26

## 2025-04-23 RX ORDER — MAGNESIUM, ALUMINUM HYDROXIDE 200-200 MG
30 TABLET,CHEWABLE ORAL ONCE
Refills: 0 | Status: COMPLETED | OUTPATIENT
Start: 2025-04-23 | End: 2025-04-23

## 2025-04-23 RX ORDER — SUCRALFATE 1 G
1 TABLET ORAL ONCE
Refills: 0 | Status: COMPLETED | OUTPATIENT
Start: 2025-04-23 | End: 2025-04-23

## 2025-04-23 RX ORDER — SUCRALFATE 1 G
1 TABLET ORAL
Qty: 120 | Refills: 0
Start: 2025-04-23 | End: 2025-05-22

## 2025-04-23 RX ORDER — OMEPRAZOLE 20 MG/1
1 CAPSULE, DELAYED RELEASE ORAL
Qty: 30 | Refills: 0
Start: 2025-04-23 | End: 2025-05-22

## 2025-04-23 RX ORDER — LIDOCAINE HYDROCHLORIDE 20 MG/ML
10 JELLY TOPICAL ONCE
Refills: 0 | Status: COMPLETED | OUTPATIENT
Start: 2025-04-23 | End: 2025-04-23

## 2025-04-23 RX ORDER — ACETAMINOPHEN 500 MG/5ML
1000 LIQUID (ML) ORAL ONCE
Refills: 0 | Status: COMPLETED | OUTPATIENT
Start: 2025-04-23 | End: 2025-04-23

## 2025-04-23 RX ORDER — ONDANSETRON HCL/PF 4 MG/2 ML
4 VIAL (ML) INJECTION ONCE
Refills: 0 | Status: COMPLETED | OUTPATIENT
Start: 2025-04-23 | End: 2025-04-23

## 2025-04-23 RX ADMIN — Medication 4 MILLIGRAM(S): at 20:04

## 2025-04-23 RX ADMIN — Medication 30 MILLILITER(S): at 20:49

## 2025-04-23 RX ADMIN — Medication 4 MILLIGRAM(S): at 18:44

## 2025-04-23 RX ADMIN — Medication 4 MILLIGRAM(S): at 18:45

## 2025-04-23 RX ADMIN — Medication 400 MILLIGRAM(S): at 20:04

## 2025-04-23 RX ADMIN — Medication 80 MILLIGRAM(S): at 20:49

## 2025-04-23 RX ADMIN — Medication 20 MILLIGRAM(S): at 18:45

## 2025-04-23 RX ADMIN — LIDOCAINE HYDROCHLORIDE 10 MILLILITER(S): 20 JELLY TOPICAL at 20:47

## 2025-04-23 RX ADMIN — Medication 4 MILLIGRAM(S): at 19:40

## 2025-04-23 RX ADMIN — Medication 1 GRAM(S): at 22:39

## 2025-04-24 ENCOUNTER — TRANSCRIPTION ENCOUNTER (OUTPATIENT)
Age: 60
End: 2025-04-24

## 2025-05-21 ENCOUNTER — APPOINTMENT (OUTPATIENT)
Dept: FAMILY MEDICINE | Facility: CLINIC | Age: 60
End: 2025-05-21
Payer: COMMERCIAL

## 2025-05-21 VITALS
WEIGHT: 223 LBS | DIASTOLIC BLOOD PRESSURE: 80 MMHG | OXYGEN SATURATION: 97 % | TEMPERATURE: 97.2 F | HEIGHT: 65 IN | BODY MASS INDEX: 37.15 KG/M2 | HEART RATE: 86 BPM | SYSTOLIC BLOOD PRESSURE: 126 MMHG

## 2025-05-21 DIAGNOSIS — E78.5 HYPERLIPIDEMIA, UNSPECIFIED: ICD-10-CM

## 2025-05-21 DIAGNOSIS — E66.9 OBESITY, UNSPECIFIED: ICD-10-CM

## 2025-05-21 DIAGNOSIS — G47.33 OBSTRUCTIVE SLEEP APNEA (ADULT) (PEDIATRIC): ICD-10-CM

## 2025-05-21 DIAGNOSIS — F17.290 NICOTINE DEPENDENCE, OTHER TOBACCO PRODUCT, UNCOMPLICATED: ICD-10-CM

## 2025-05-21 DIAGNOSIS — I10 ESSENTIAL (PRIMARY) HYPERTENSION: ICD-10-CM

## 2025-05-21 DIAGNOSIS — E11.9 TYPE 2 DIABETES MELLITUS W/OUT COMPLICATIONS: ICD-10-CM

## 2025-05-21 PROCEDURE — G2211 COMPLEX E/M VISIT ADD ON: CPT

## 2025-05-21 PROCEDURE — 99214 OFFICE O/P EST MOD 30 MIN: CPT

## 2025-05-21 PROCEDURE — G0447 BEHAVIOR COUNSEL OBESITY 15M: CPT | Mod: 59

## 2025-05-30 ENCOUNTER — APPOINTMENT (OUTPATIENT)
Dept: ORTHOPEDIC SURGERY | Facility: CLINIC | Age: 60
End: 2025-05-30
Payer: COMMERCIAL

## 2025-05-30 DIAGNOSIS — Q74.2 OTHER CONGENITAL MALFORMATIONS OF LOWER LIMB(S), INCLUDING PELVIC GIRDLE: ICD-10-CM

## 2025-05-30 PROCEDURE — 99024 POSTOP FOLLOW-UP VISIT: CPT

## 2025-07-02 ENCOUNTER — APPOINTMENT (OUTPATIENT)
Dept: FAMILY MEDICINE | Facility: CLINIC | Age: 60
End: 2025-07-02
Payer: COMMERCIAL

## 2025-07-02 VITALS
HEART RATE: 92 BPM | SYSTOLIC BLOOD PRESSURE: 122 MMHG | DIASTOLIC BLOOD PRESSURE: 80 MMHG | BODY MASS INDEX: 37.15 KG/M2 | OXYGEN SATURATION: 95 % | HEIGHT: 65 IN | WEIGHT: 223 LBS | TEMPERATURE: 97.4 F

## 2025-07-02 PROBLEM — F41.1 GENERALIZED ANXIETY DISORDER: Status: ACTIVE | Noted: 2025-07-02

## 2025-07-02 PROBLEM — F63.0 ADDICTIVE GAMBLING: Status: ACTIVE | Noted: 2025-07-02

## 2025-07-02 PROCEDURE — 36415 COLL VENOUS BLD VENIPUNCTURE: CPT

## 2025-07-02 PROCEDURE — 99214 OFFICE O/P EST MOD 30 MIN: CPT

## 2025-07-02 PROCEDURE — G0447 BEHAVIOR COUNSEL OBESITY 15M: CPT | Mod: 59

## 2025-07-02 PROCEDURE — G2211 COMPLEX E/M VISIT ADD ON: CPT

## 2025-07-25 ENCOUNTER — APPOINTMENT (OUTPATIENT)
Dept: ORTHOPEDIC SURGERY | Facility: CLINIC | Age: 60
End: 2025-07-25
Payer: COMMERCIAL

## 2025-07-25 VITALS — WEIGHT: 223 LBS | HEIGHT: 65 IN | BODY MASS INDEX: 37.15 KG/M2

## 2025-07-25 DIAGNOSIS — Q74.2 OTHER CONGENITAL MALFORMATIONS OF LOWER LIMB(S), INCLUDING PELVIC GIRDLE: ICD-10-CM

## 2025-07-25 PROCEDURE — 99213 OFFICE O/P EST LOW 20 MIN: CPT

## 2025-08-25 ENCOUNTER — TRANSCRIPTION ENCOUNTER (OUTPATIENT)
Age: 60
End: 2025-08-25

## 2025-08-26 ENCOUNTER — APPOINTMENT (OUTPATIENT)
Dept: CARDIOLOGY | Facility: CLINIC | Age: 60
End: 2025-08-26

## 2025-09-02 ENCOUNTER — RX RENEWAL (OUTPATIENT)
Age: 60
End: 2025-09-02

## 2025-09-10 ENCOUNTER — RX RENEWAL (OUTPATIENT)
Age: 60
End: 2025-09-10

## 2025-09-12 ENCOUNTER — APPOINTMENT (OUTPATIENT)
Dept: ORTHOPEDIC SURGERY | Facility: CLINIC | Age: 60
End: 2025-09-12

## 2025-09-18 ENCOUNTER — TRANSCRIPTION ENCOUNTER (OUTPATIENT)
Age: 60
End: 2025-09-18